# Patient Record
Sex: FEMALE | Race: BLACK OR AFRICAN AMERICAN | NOT HISPANIC OR LATINO | Employment: FULL TIME | URBAN - METROPOLITAN AREA
[De-identification: names, ages, dates, MRNs, and addresses within clinical notes are randomized per-mention and may not be internally consistent; named-entity substitution may affect disease eponyms.]

---

## 2017-06-02 ENCOUNTER — HISTORICAL (OUTPATIENT)
Dept: INTERNAL MEDICINE | Facility: CLINIC | Age: 32
End: 2017-06-02

## 2017-07-27 ENCOUNTER — HISTORICAL (OUTPATIENT)
Dept: ADMINISTRATIVE | Facility: HOSPITAL | Age: 32
End: 2017-07-27

## 2017-07-27 LAB
T4 FREE SERPL-MCNC: 0.99 NG/DL (ref 0.76–1.46)
TSH SERPL-ACNC: 3.56 MIU/L (ref 0.36–3.74)

## 2017-10-16 ENCOUNTER — HISTORICAL (OUTPATIENT)
Dept: INTERNAL MEDICINE | Facility: CLINIC | Age: 32
End: 2017-10-16

## 2017-10-16 LAB
ABS NEUT (OLG): 2.44 X10(3)/MCL (ref 2.1–9.2)
ALBUMIN SERPL-MCNC: 4.5 GM/DL (ref 3.4–5)
ALBUMIN/GLOB SERPL: 1 RATIO (ref 1–2)
ALP SERPL-CCNC: 49 UNIT/L (ref 45–117)
ALT SERPL-CCNC: 17 UNIT/L (ref 12–78)
APPEARANCE, UA: CLEAR
AST SERPL-CCNC: 17 UNIT/L (ref 15–37)
BACTERIA #/AREA URNS AUTO: ABNORMAL /[HPF]
BASOPHILS # BLD AUTO: 0.02 X10(3)/MCL
BASOPHILS NFR BLD AUTO: 0 % (ref 0–1)
BILIRUB SERPL-MCNC: 0.5 MG/DL (ref 0.2–1)
BILIRUB UR QL STRIP: NEGATIVE
BILIRUBIN DIRECT+TOT PNL SERPL-MCNC: 0.1 MG/DL
BILIRUBIN DIRECT+TOT PNL SERPL-MCNC: 0.4 MG/DL
BUN SERPL-MCNC: 8 MG/DL (ref 7–18)
CALCIUM SERPL-MCNC: 9 MG/DL (ref 8.5–10.1)
CHLORIDE SERPL-SCNC: 107 MMOL/L (ref 98–107)
CHOLEST SERPL-MCNC: 218 MG/DL
CHOLEST/HDLC SERPL: 4.1 {RATIO} (ref 0–4.4)
CO2 SERPL-SCNC: 26 MMOL/L (ref 21–32)
COLOR UR: ABNORMAL
CREAT SERPL-MCNC: 0.6 MG/DL (ref 0.6–1.3)
DEPRECATED CALCIDIOL+CALCIFEROL SERPL-MC: 15.47 NG/ML (ref 30–80)
EOSINOPHIL # BLD AUTO: 0.03 10*3/UL
EOSINOPHIL NFR BLD AUTO: 1 % (ref 0–5)
ERYTHROCYTE [DISTWIDTH] IN BLOOD BY AUTOMATED COUNT: 14.4 % (ref 11.5–14.5)
EST. AVERAGE GLUCOSE BLD GHB EST-MCNC: 120 MG/DL
GLOBULIN SER-MCNC: 4.5 GM/ML (ref 2.3–3.5)
GLUCOSE (UA): NORMAL
GLUCOSE SERPL-MCNC: 101 MG/DL (ref 74–106)
HAV IGM SERPL QL IA: NONREACTIVE
HBA1C MFR BLD: 5.8 % (ref 4.2–6.3)
HBV CORE IGM SERPL QL IA: NONREACTIVE
HBV SURFACE AG SERPL QL IA: NEGATIVE
HCT VFR BLD AUTO: 41.3 % (ref 35–46)
HCV AB SERPL QL IA: NONREACTIVE
HDLC SERPL-MCNC: 53 MG/DL
HGB BLD-MCNC: 13.4 GM/DL (ref 12–16)
HGB UR QL STRIP: NEGATIVE
HIV 1+2 AB+HIV1 P24 AG SERPL QL IA: NONREACTIVE
HYALINE CASTS #/AREA URNS LPF: ABNORMAL /[LPF]
IMM GRANULOCYTES # BLD AUTO: 0.01 10*3/UL
IMM GRANULOCYTES NFR BLD AUTO: 0 %
KETONES UR QL STRIP: ABNORMAL
LDLC SERPL CALC-MCNC: 150 MG/DL (ref 0–130)
LEUKOCYTE ESTERASE UR QL STRIP: NEGATIVE
LYMPHOCYTES # BLD AUTO: 1.56 X10(3)/MCL
LYMPHOCYTES NFR BLD AUTO: 35 % (ref 15–40)
MCH RBC QN AUTO: 27 PG (ref 26–34)
MCHC RBC AUTO-ENTMCNC: 32.4 GM/DL (ref 31–37)
MCV RBC AUTO: 83.3 FL (ref 80–100)
MONOCYTES # BLD AUTO: 0.36 X10(3)/MCL
MONOCYTES NFR BLD AUTO: 8 % (ref 4–12)
NEUTROPHILS # BLD AUTO: 2.44 X10(3)/MCL
NEUTROPHILS NFR BLD AUTO: 55 X10(3)/MCL
NITRITE UR QL STRIP: NEGATIVE
PH UR STRIP: 6 [PH] (ref 4.5–8)
PLATELET # BLD AUTO: 265 X10(3)/MCL (ref 130–400)
PMV BLD AUTO: 10.3 FL (ref 7.4–10.4)
POTASSIUM SERPL-SCNC: 3.9 MMOL/L (ref 3.5–5.1)
PROT SERPL-MCNC: 9 GM/DL (ref 6.4–8.2)
PROT UR QL STRIP: NEGATIVE
RBC # BLD AUTO: 4.96 X10(6)/MCL (ref 4–5.2)
RBC #/AREA URNS AUTO: ABNORMAL /[HPF]
SODIUM SERPL-SCNC: 140 MMOL/L (ref 136–145)
SP GR UR STRIP: 1.01 (ref 1–1.03)
SQUAMOUS #/AREA URNS LPF: ABNORMAL /[LPF]
TRIGL SERPL-MCNC: 76 MG/DL
TSH SERPL-ACNC: 1.12 MIU/L (ref 0.36–3.74)
UROBILINOGEN UR STRIP-ACNC: NORMAL
VLDLC SERPL CALC-MCNC: 15 MG/DL
WBC # SPEC AUTO: 4.4 X10(3)/MCL (ref 4.5–11)
WBC #/AREA URNS AUTO: ABNORMAL /HPF

## 2017-11-21 ENCOUNTER — HISTORICAL (OUTPATIENT)
Dept: INTERNAL MEDICINE | Facility: CLINIC | Age: 32
End: 2017-11-21

## 2017-11-21 LAB
ABS NEUT (OLG): 4.47 X10(3)/MCL (ref 2.1–9.2)
BASOPHILS # BLD AUTO: 0.03 X10(3)/MCL
BASOPHILS NFR BLD AUTO: 0 % (ref 0–1)
EOSINOPHIL # BLD AUTO: 0.06 X10(3)/MCL
EOSINOPHIL NFR BLD AUTO: 1 % (ref 0–5)
ERYTHROCYTE [DISTWIDTH] IN BLOOD BY AUTOMATED COUNT: 13.3 % (ref 11.5–14.5)
HCT VFR BLD AUTO: 35.7 % (ref 35–46)
HGB BLD-MCNC: 12.2 GM/DL (ref 12–16)
IMM GRANULOCYTES # BLD AUTO: 0.02 10*3/UL
IMM GRANULOCYTES NFR BLD AUTO: 0 %
LYMPHOCYTES # BLD AUTO: 2.74 X10(3)/MCL
LYMPHOCYTES NFR BLD AUTO: 34 % (ref 15–40)
MCH RBC QN AUTO: 27.9 PG (ref 26–34)
MCHC RBC AUTO-ENTMCNC: 34.2 GM/DL (ref 31–37)
MCV RBC AUTO: 81.7 FL (ref 80–100)
MONOCYTES # BLD AUTO: 0.7 X10(3)/MCL
MONOCYTES NFR BLD AUTO: 9 % (ref 4–12)
NEUTROPHILS # BLD AUTO: 4.47 X10(3)/MCL
NEUTROPHILS NFR BLD AUTO: 56 X10(3)/MCL
PLATELET # BLD AUTO: 250 X10(3)/MCL (ref 130–400)
PMV BLD AUTO: 9.7 FL (ref 7.4–10.4)
PROT SERPL-MCNC: 7.5 GM/DL
RBC # BLD AUTO: 4.37 X10(6)/MCL (ref 4–5.2)
WBC # SPEC AUTO: 8 X10(3)/MCL (ref 4.5–11)

## 2017-12-12 ENCOUNTER — HISTORICAL (OUTPATIENT)
Dept: RADIOLOGY | Facility: HOSPITAL | Age: 32
End: 2017-12-12

## 2017-12-12 LAB
BUN SERPL-MCNC: 12 MG/DL (ref 7–18)
CALCIUM SERPL-MCNC: 8.9 MG/DL (ref 8.5–10.1)
CHLORIDE SERPL-SCNC: 108 MMOL/L (ref 98–107)
CO2 SERPL-SCNC: 26 MMOL/L (ref 21–32)
CREAT SERPL-MCNC: 0.7 MG/DL (ref 0.6–1.3)
GLUCOSE SERPL-MCNC: 78 MG/DL (ref 74–106)
POTASSIUM SERPL-SCNC: 4 MMOL/L (ref 3.5–5.1)
SODIUM SERPL-SCNC: 141 MMOL/L (ref 136–145)

## 2017-12-21 LAB
COLOR STL: NORMAL
CONSISTENCY STL: NORMAL
HEMOCCULT SP1 STL QL: NEGATIVE

## 2017-12-22 ENCOUNTER — HISTORICAL (OUTPATIENT)
Dept: INTERNAL MEDICINE | Facility: CLINIC | Age: 32
End: 2017-12-22

## 2017-12-22 LAB
COLOR STL: NORMAL
COLOR STL: NORMAL
CONSISTENCY STL: NORMAL
CONSISTENCY STL: NORMAL
HEMOCCULT SP2 STL QL: NEGATIVE

## 2018-01-19 ENCOUNTER — HISTORICAL (OUTPATIENT)
Dept: ENDOSCOPY | Facility: HOSPITAL | Age: 33
End: 2018-01-19

## 2018-01-19 LAB — B-HCG SERPL QL: NEGATIVE

## 2018-04-05 ENCOUNTER — HISTORICAL (OUTPATIENT)
Dept: INTERNAL MEDICINE | Facility: CLINIC | Age: 33
End: 2018-04-05

## 2018-04-05 LAB
ALBUMIN SERPL-MCNC: 3.8 GM/DL (ref 3.4–5)
ALBUMIN/GLOB SERPL: 1 RATIO (ref 1–2)
ALP SERPL-CCNC: 51 UNIT/L (ref 45–117)
ALT SERPL-CCNC: 37 UNIT/L (ref 12–78)
AST SERPL-CCNC: 25 UNIT/L (ref 15–37)
BILIRUB SERPL-MCNC: 0.4 MG/DL (ref 0.2–1)
BILIRUBIN DIRECT+TOT PNL SERPL-MCNC: 0.1 MG/DL
BILIRUBIN DIRECT+TOT PNL SERPL-MCNC: 0.3 MG/DL
BUN SERPL-MCNC: 10 MG/DL (ref 7–18)
CALCIUM SERPL-MCNC: 8.8 MG/DL (ref 8.5–10.1)
CHLORIDE SERPL-SCNC: 110 MMOL/L (ref 98–107)
CO2 SERPL-SCNC: 25 MMOL/L (ref 21–32)
CREAT SERPL-MCNC: 0.8 MG/DL (ref 0.6–1.3)
DEPRECATED CALCIDIOL+CALCIFEROL SERPL-MC: 40.27 NG/ML (ref 30–80)
GLOBULIN SER-MCNC: 4.8 GM/ML (ref 2.3–3.5)
GLUCOSE SERPL-MCNC: 115 MG/DL (ref 74–106)
POTASSIUM SERPL-SCNC: 3.5 MMOL/L (ref 3.5–5.1)
PROT SERPL-MCNC: 8.6 GM/DL (ref 6.4–8.2)
SODIUM SERPL-SCNC: 139 MMOL/L (ref 136–145)

## 2018-06-04 ENCOUNTER — HISTORICAL (OUTPATIENT)
Dept: OTOLARYNGOLOGY | Facility: CLINIC | Age: 33
End: 2018-06-04

## 2018-08-27 ENCOUNTER — HISTORICAL (OUTPATIENT)
Dept: INTERNAL MEDICINE | Facility: CLINIC | Age: 33
End: 2018-08-27

## 2018-08-27 LAB
ABS NEUT (OLG): 2.22 X10(3)/MCL (ref 2.1–9.2)
ALBUMIN SERPL-MCNC: 4 GM/DL (ref 3.4–5)
ALBUMIN/GLOB SERPL: 1 RATIO (ref 1–2)
ALP SERPL-CCNC: 39 UNIT/L (ref 45–117)
ALT SERPL-CCNC: 43 UNIT/L (ref 12–78)
APPEARANCE, UA: CLEAR
AST SERPL-CCNC: 29 UNIT/L (ref 15–37)
BACTERIA #/AREA URNS AUTO: ABNORMAL /[HPF]
BASOPHILS # BLD AUTO: 0.03 X10(3)/MCL
BASOPHILS NFR BLD AUTO: 0 %
BILIRUB SERPL-MCNC: 0.5 MG/DL (ref 0.2–1)
BILIRUB UR QL STRIP: NEGATIVE
BILIRUBIN DIRECT+TOT PNL SERPL-MCNC: 0.1 MG/DL
BILIRUBIN DIRECT+TOT PNL SERPL-MCNC: 0.4 MG/DL
BUN SERPL-MCNC: 6 MG/DL (ref 7–18)
CALCIUM SERPL-MCNC: 8.7 MG/DL (ref 8.5–10.1)
CHLORIDE SERPL-SCNC: 104 MMOL/L (ref 98–107)
CHOLEST SERPL-MCNC: 200 MG/DL
CHOLEST/HDLC SERPL: 2.9 {RATIO} (ref 0–4.4)
CO2 SERPL-SCNC: 27 MMOL/L (ref 21–32)
COLOR UR: NORMAL
CREAT SERPL-MCNC: 0.7 MG/DL (ref 0.6–1.3)
EOSINOPHIL # BLD AUTO: 0.09 X10(3)/MCL
EOSINOPHIL NFR BLD AUTO: 2 %
ERYTHROCYTE [DISTWIDTH] IN BLOOD BY AUTOMATED COUNT: 14.1 % (ref 11.5–14.5)
EST. AVERAGE GLUCOSE BLD GHB EST-MCNC: 108 MG/DL
GLOBULIN SER-MCNC: 4.2 GM/ML (ref 2.3–3.5)
GLUCOSE (UA): NORMAL
GLUCOSE SERPL-MCNC: 89 MG/DL (ref 74–106)
HAV IGM SERPL QL IA: NONREACTIVE
HBA1C MFR BLD: 5.4 % (ref 4.2–6.3)
HBV CORE IGM SERPL QL IA: NONREACTIVE
HBV SURFACE AG SERPL QL IA: NEGATIVE
HCT VFR BLD AUTO: 39 % (ref 35–46)
HCV AB SERPL QL IA: NONREACTIVE
HDLC SERPL-MCNC: 68 MG/DL
HGB BLD-MCNC: 12.7 GM/DL (ref 12–16)
HGB UR QL STRIP: NEGATIVE
HIV 1+2 AB+HIV1 P24 AG SERPL QL IA: NONREACTIVE
HYALINE CASTS #/AREA URNS LPF: ABNORMAL /[LPF]
IMM GRANULOCYTES # BLD AUTO: 0.01 10*3/UL
IMM GRANULOCYTES NFR BLD AUTO: 0 %
KETONES UR QL STRIP: NEGATIVE
LDLC SERPL CALC-MCNC: 110 MG/DL (ref 0–130)
LEUKOCYTE ESTERASE UR QL STRIP: NEGATIVE
LYMPHOCYTES # BLD AUTO: 2.52 X10(3)/MCL
LYMPHOCYTES NFR BLD AUTO: 46 % (ref 13–40)
MCH RBC QN AUTO: 27 PG (ref 26–34)
MCHC RBC AUTO-ENTMCNC: 32.6 GM/DL (ref 31–37)
MCV RBC AUTO: 82.8 FL (ref 80–100)
MONOCYTES # BLD AUTO: 0.63 X10(3)/MCL
MONOCYTES NFR BLD AUTO: 12 % (ref 4–12)
NEUTROPHILS # BLD AUTO: 2.22 X10(3)/MCL
NEUTROPHILS NFR BLD AUTO: 40 X10(3)/MCL
NITRITE UR QL STRIP: NEGATIVE
PH UR STRIP: 5.5 [PH] (ref 4.5–8)
PLATELET # BLD AUTO: 294 X10(3)/MCL (ref 130–400)
PMV BLD AUTO: 9.9 FL (ref 7.4–10.4)
POTASSIUM SERPL-SCNC: 4.3 MMOL/L (ref 3.5–5.1)
PROT SERPL-MCNC: 8.2 GM/DL (ref 6.4–8.2)
PROT UR QL STRIP: NEGATIVE
RBC # BLD AUTO: 4.71 X10(6)/MCL (ref 4–5.2)
RBC #/AREA URNS AUTO: ABNORMAL /[HPF]
SODIUM SERPL-SCNC: 138 MMOL/L (ref 136–145)
SP GR UR STRIP: 1.01 (ref 1–1.03)
SQUAMOUS #/AREA URNS LPF: ABNORMAL /[LPF]
T PALLIDUM AB SER QL: NONREACTIVE
TRIGL SERPL-MCNC: 111 MG/DL
TSH SERPL-ACNC: 1.66 MIU/L (ref 0.36–3.74)
UROBILINOGEN UR STRIP-ACNC: NORMAL
VLDLC SERPL CALC-MCNC: 22 MG/DL
WBC # SPEC AUTO: 5.5 X10(3)/MCL (ref 4.5–11)
WBC #/AREA URNS AUTO: ABNORMAL /HPF

## 2019-02-13 LAB — POC BETA-HCG (QUAL): NEGATIVE

## 2019-07-10 ENCOUNTER — HISTORICAL (OUTPATIENT)
Dept: RADIOLOGY | Facility: HOSPITAL | Age: 34
End: 2019-07-10

## 2019-08-21 ENCOUNTER — HISTORICAL (OUTPATIENT)
Dept: INTERNAL MEDICINE | Facility: CLINIC | Age: 34
End: 2019-08-21

## 2019-08-21 LAB
BUN SERPL-MCNC: 11 MG/DL (ref 7–18)
CALCIUM SERPL-MCNC: 8.8 MG/DL (ref 8.5–10.1)
CHLORIDE SERPL-SCNC: 105 MMOL/L (ref 98–107)
CO2 SERPL-SCNC: 25 MMOL/L (ref 21–32)
CREAT SERPL-MCNC: 0.7 MG/DL (ref 0.6–1.3)
CREAT/UREA NIT SERPL: 16
EST. AVERAGE GLUCOSE BLD GHB EST-MCNC: 128 MG/DL
GLUCOSE SERPL-MCNC: 94 MG/DL (ref 74–106)
HBA1C MFR BLD: 6.1 % (ref 4.2–6.3)
POTASSIUM SERPL-SCNC: 4 MMOL/L (ref 3.5–5.1)
SODIUM SERPL-SCNC: 138 MMOL/L (ref 136–145)
TSH SERPL-ACNC: 0.87 MIU/L (ref 0.36–3.74)

## 2019-08-28 ENCOUNTER — HISTORICAL (OUTPATIENT)
Dept: ADMINISTRATIVE | Facility: HOSPITAL | Age: 34
End: 2019-08-28

## 2019-08-28 LAB — B-HCG SERPL QL: NEGATIVE

## 2020-01-22 ENCOUNTER — HISTORICAL (OUTPATIENT)
Dept: WOUND CARE | Facility: HOSPITAL | Age: 35
End: 2020-01-22

## 2020-01-22 LAB — B-HCG SERPL QL: NEGATIVE

## 2020-02-18 LAB — POC BETA-HCG (QUAL): NEGATIVE

## 2020-11-02 ENCOUNTER — HISTORICAL (OUTPATIENT)
Dept: RADIOLOGY | Facility: HOSPITAL | Age: 35
End: 2020-11-02

## 2020-11-02 LAB
ABS NEUT (OLG): 2.74 X10(3)/MCL (ref 2.1–9.2)
ALBUMIN SERPL-MCNC: 4.3 GM/DL (ref 3.5–5)
ALBUMIN/GLOB SERPL: 1.1 RATIO (ref 1.1–2)
ALP SERPL-CCNC: 48 UNIT/L (ref 40–150)
ALT SERPL-CCNC: 11 UNIT/L (ref 0–55)
APPEARANCE, UA: CLEAR
AST SERPL-CCNC: 20 UNIT/L (ref 5–34)
BACTERIA #/AREA URNS AUTO: ABNORMAL /HPF
BASOPHILS # BLD AUTO: 0 X10(3)/MCL (ref 0–0.2)
BASOPHILS NFR BLD AUTO: 0 %
BILIRUB SERPL-MCNC: 0.6 MG/DL
BILIRUB UR QL STRIP: NEGATIVE
BILIRUBIN DIRECT+TOT PNL SERPL-MCNC: 0.2 MG/DL (ref 0–0.5)
BILIRUBIN DIRECT+TOT PNL SERPL-MCNC: 0.4 MG/DL (ref 0–0.8)
BUN SERPL-MCNC: 11 MG/DL (ref 7–18.7)
CALCIUM SERPL-MCNC: 9.4 MG/DL (ref 8.4–10.2)
CHLORIDE SERPL-SCNC: 105 MMOL/L (ref 98–107)
CHOLEST SERPL-MCNC: 196 MG/DL
CHOLEST/HDLC SERPL: 5 {RATIO} (ref 0–5)
CO2 SERPL-SCNC: 25 MMOL/L (ref 22–29)
COLOR UR: NORMAL
CREAT SERPL-MCNC: 0.71 MG/DL (ref 0.55–1.02)
DEPRECATED CALCIDIOL+CALCIFEROL SERPL-MC: 47.7 NG/ML (ref 30–80)
EOSINOPHIL # BLD AUTO: 0 X10(3)/MCL (ref 0–0.9)
EOSINOPHIL NFR BLD AUTO: 1 %
ERYTHROCYTE [DISTWIDTH] IN BLOOD BY AUTOMATED COUNT: 13.6 % (ref 11.5–14.5)
EST. AVERAGE GLUCOSE BLD GHB EST-MCNC: 116.9 MG/DL
GLOBULIN SER-MCNC: 4 GM/DL (ref 2.4–3.5)
GLUCOSE (UA): NEGATIVE
GLUCOSE SERPL-MCNC: 88 MG/DL (ref 74–100)
HAV IGM SERPL QL IA: NONREACTIVE
HBA1C MFR BLD: 5.7 %
HBV CORE IGM SERPL QL IA: NONREACTIVE
HBV SURFACE AG SERPL QL IA: NONREACTIVE
HCT VFR BLD AUTO: 38.3 % (ref 35–46)
HCV AB SERPL QL IA: NONREACTIVE
HDLC SERPL-MCNC: 43 MG/DL (ref 35–60)
HGB BLD-MCNC: 12.8 GM/DL (ref 12–16)
HGB UR QL STRIP: 0.1
HIV 1+2 AB+HIV1 P24 AG SERPL QL IA: NONREACTIVE
HYALINE CASTS #/AREA URNS LPF: ABNORMAL /LPF
KETONES UR QL STRIP: NEGATIVE
LDLC SERPL CALC-MCNC: 136 MG/DL (ref 50–140)
LEUKOCYTE ESTERASE UR QL STRIP: NEGATIVE
LYMPHOCYTES # BLD AUTO: 2.6 X10(3)/MCL (ref 0.6–4.6)
LYMPHOCYTES NFR BLD AUTO: 44 %
MCH RBC QN AUTO: 27.6 PG (ref 26–34)
MCHC RBC AUTO-ENTMCNC: 33.4 GM/DL (ref 31–37)
MCV RBC AUTO: 82.7 FL (ref 80–100)
MONOCYTES # BLD AUTO: 0.5 X10(3)/MCL (ref 0.1–1.3)
MONOCYTES NFR BLD AUTO: 8 %
NEUTROPHILS # BLD AUTO: 2.74 X10(3)/MCL (ref 2.1–9.2)
NEUTROPHILS NFR BLD AUTO: 46 %
NITRITE UR QL STRIP: NEGATIVE
PH UR STRIP: 6.5 [PH] (ref 4.5–8)
PLATELET # BLD AUTO: 287 X10(3)/MCL (ref 130–400)
PMV BLD AUTO: 10.7 FL (ref 7.4–10.4)
POTASSIUM SERPL-SCNC: 4 MMOL/L (ref 3.5–5.1)
PROT SERPL-MCNC: 8.3 GM/DL (ref 6.4–8.3)
PROT UR QL STRIP: NEGATIVE
RBC # BLD AUTO: 4.63 X10(6)/MCL (ref 4–5.2)
RBC #/AREA URNS AUTO: ABNORMAL /HPF
SODIUM SERPL-SCNC: 139 MMOL/L (ref 136–145)
SP GR UR STRIP: 1.02 (ref 1–1.03)
SQUAMOUS #/AREA URNS LPF: ABNORMAL /LPF
T PALLIDUM AB SER QL: NONREACTIVE
TRIGL SERPL-MCNC: 83 MG/DL (ref 37–140)
TSH SERPL-ACNC: 0.74 UIU/ML (ref 0.35–4.94)
UROBILINOGEN UR STRIP-ACNC: NORMAL
VLDLC SERPL CALC-MCNC: 17 MG/DL
WBC # SPEC AUTO: 5.9 X10(3)/MCL (ref 4.5–11)
WBC #/AREA URNS AUTO: ABNORMAL /HPF

## 2020-11-19 ENCOUNTER — HISTORICAL (OUTPATIENT)
Dept: ADMINISTRATIVE | Facility: HOSPITAL | Age: 35
End: 2020-11-19

## 2021-02-03 ENCOUNTER — HISTORICAL (OUTPATIENT)
Dept: SURGERY | Facility: HOSPITAL | Age: 36
End: 2021-02-03

## 2021-03-09 LAB — POC BETA-HCG (QUAL): POSITIVE

## 2021-04-08 ENCOUNTER — HISTORICAL (OUTPATIENT)
Dept: ADMINISTRATIVE | Facility: HOSPITAL | Age: 36
End: 2021-04-08

## 2021-04-08 LAB
ABS NEUT (OLG): 5.61 X10(3)/MCL (ref 2.1–9.2)
ALBUMIN SERPL-MCNC: 4 GM/DL (ref 3.5–5)
ALBUMIN/GLOB SERPL: 0.8 RATIO (ref 1.1–2)
ALP SERPL-CCNC: 43 UNIT/L (ref 40–150)
ALT SERPL-CCNC: 19 UNIT/L (ref 0–55)
APPEARANCE, UA: CLEAR
AST SERPL-CCNC: 32 UNIT/L (ref 5–34)
BACTERIA #/AREA URNS AUTO: ABNORMAL /HPF
BASOPHILS # BLD AUTO: 0 X10(3)/MCL (ref 0–0.2)
BASOPHILS NFR BLD AUTO: 0 %
BILIRUB SERPL-MCNC: 0.7 MG/DL
BILIRUB SERPL-MCNC: NEGATIVE MG/DL
BILIRUB UR QL STRIP: NEGATIVE
BILIRUBIN DIRECT+TOT PNL SERPL-MCNC: 0.2 MG/DL (ref 0–0.5)
BILIRUBIN DIRECT+TOT PNL SERPL-MCNC: 0.5 MG/DL (ref 0–0.8)
BLOOD URINE, POC: NEGATIVE
BUN SERPL-MCNC: 5.3 MG/DL (ref 7–18.7)
CALCIUM SERPL-MCNC: 9.3 MG/DL (ref 8.4–10.2)
CHLORIDE SERPL-SCNC: 104 MMOL/L (ref 98–107)
CLARITY, POC UA: CLEAR
CO2 SERPL-SCNC: 22 MMOL/L (ref 22–29)
COLOR UR: NORMAL
COLOR, POC UA: YELLOW
CREAT SERPL-MCNC: 0.57 MG/DL (ref 0.55–1.02)
EOSINOPHIL # BLD AUTO: 0 X10(3)/MCL (ref 0–0.9)
EOSINOPHIL NFR BLD AUTO: 0 %
ERYTHROCYTE [DISTWIDTH] IN BLOOD BY AUTOMATED COUNT: 13.1 % (ref 11.5–14.5)
EST. AVERAGE GLUCOSE BLD GHB EST-MCNC: 114 MG/DL
GLOBULIN SER-MCNC: 4.9 GM/DL (ref 2.4–3.5)
GLUCOSE (UA): NEGATIVE
GLUCOSE SERPL-MCNC: 100 MG/DL (ref 74–100)
GLUCOSE UR QL STRIP: NEGATIVE
HBA1C MFR BLD: 5.6 %
HBV SURFACE AG SERPL QL IA: NONREACTIVE
HCT VFR BLD AUTO: 38.9 % (ref 35–46)
HCV AB SERPL QL IA: NONREACTIVE
HGB BLD-MCNC: 12.8 GM/DL (ref 12–16)
HGB UR QL STRIP: NEGATIVE
HIV 1+2 AB+HIV1 P24 AG SERPL QL IA: NONREACTIVE
HYALINE CASTS #/AREA URNS LPF: ABNORMAL /LPF
IMM GRANULOCYTES # BLD AUTO: 0.02 10*3/UL
IMM GRANULOCYTES NFR BLD AUTO: 0 %
KETONES UR QL STRIP: NEGATIVE
KETONES UR QL STRIP: NEGATIVE
LEUKOCYTE EST, POC UA: NEGATIVE
LEUKOCYTE ESTERASE UR QL STRIP: NEGATIVE
LYMPHOCYTES # BLD AUTO: 2.3 X10(3)/MCL (ref 0.6–4.6)
LYMPHOCYTES NFR BLD AUTO: 26 %
MCH RBC QN AUTO: 27.9 PG (ref 26–34)
MCHC RBC AUTO-ENTMCNC: 32.9 GM/DL (ref 31–37)
MCV RBC AUTO: 84.7 FL (ref 80–100)
MONOCYTES # BLD AUTO: 0.8 X10(3)/MCL (ref 0.1–1.3)
MONOCYTES NFR BLD AUTO: 10 %
NEUTROPHILS # BLD AUTO: 5.61 X10(3)/MCL (ref 2.1–9.2)
NEUTROPHILS NFR BLD AUTO: 64 %
NITRITE UR QL STRIP: NEGATIVE
NITRITE, POC UA: NEGATIVE
PH UR STRIP: 7 [PH] (ref 4.5–8)
PH, POC UA: 7
PLATELET # BLD AUTO: 321 X10(3)/MCL (ref 130–400)
PMV BLD AUTO: 10.5 FL (ref 7.4–10.4)
POTASSIUM SERPL-SCNC: 4.4 MMOL/L (ref 3.5–5.1)
PROT SERPL-MCNC: 8.9 GM/DL (ref 6.4–8.3)
PROT UR QL STRIP: NEGATIVE
PROTEIN, POC: NEGATIVE
RBC # BLD AUTO: 4.59 X10(6)/MCL (ref 4–5.2)
RBC #/AREA URNS AUTO: ABNORMAL /HPF
SODIUM SERPL-SCNC: 136 MMOL/L (ref 136–145)
SP GR UR STRIP: 1.01 (ref 1–1.03)
SPECIFIC GRAVITY, POC UA: 1.02
SQUAMOUS #/AREA URNS LPF: ABNORMAL /LPF
T PALLIDUM AB SER QL: NONREACTIVE
TSH SERPL-ACNC: 1.45 UIU/ML (ref 0.35–4.94)
UROBILINOGEN UR STRIP-ACNC: NORMAL
UROBILINOGEN, POC UA: NORMAL
WBC # SPEC AUTO: 8.8 X10(3)/MCL (ref 4.5–11)
WBC #/AREA URNS AUTO: ABNORMAL /HPF

## 2021-04-10 LAB — FINAL CULTURE: NO GROWTH

## 2021-04-13 ENCOUNTER — HISTORICAL (OUTPATIENT)
Dept: ADMINISTRATIVE | Facility: HOSPITAL | Age: 36
End: 2021-04-13

## 2021-04-13 LAB
CREAT 24H UR-MCNC: 1102.5 MG/DAY (ref 710–1650)
CREAT UR-MCNC: 45 MG/DL (ref 47–110)
PROT 24H UR-MCNC: <167 MG/24HR (ref 0–165)

## 2021-04-27 LAB
BILIRUB SERPL-MCNC: NEGATIVE MG/DL
BLOOD URINE, POC: NEGATIVE
CLARITY, POC UA: CLEAR
COLOR, POC UA: YELLOW
GLUCOSE UR QL STRIP: NEGATIVE
KETONES UR QL STRIP: NEGATIVE
LEUKOCYTE EST, POC UA: NEGATIVE
NITRITE, POC UA: NEGATIVE
PH, POC UA: 6
PROTEIN, POC: NEGATIVE
SPECIFIC GRAVITY, POC UA: 1.03
UROBILINOGEN, POC UA: NORMAL

## 2021-05-18 LAB
BILIRUB SERPL-MCNC: NEGATIVE MG/DL
BLOOD URINE, POC: NEGATIVE
CLARITY, POC UA: CLEAR
COLOR, POC UA: YELLOW
GLUCOSE UR QL STRIP: NEGATIVE
KETONES UR QL STRIP: NEGATIVE
LEUKOCYTE EST, POC UA: NEGATIVE
NITRITE, POC UA: NEGATIVE
PROTEIN, POC: NEGATIVE
SPECIFIC GRAVITY, POC UA: 1.02
UROBILINOGEN, POC UA: NORMAL

## 2021-06-15 LAB
BILIRUB SERPL-MCNC: NEGATIVE MG/DL
BLOOD URINE, POC: NEGATIVE
CLARITY, POC UA: NORMAL
COLOR, POC UA: YELLOW
GLUCOSE UR QL STRIP: NEGATIVE
KETONES UR QL STRIP: NEGATIVE
LEUKOCYTE EST, POC UA: NEGATIVE
NITRITE, POC UA: NEGATIVE
PH, POC UA: 7
PROTEIN, POC: NEGATIVE
SPECIFIC GRAVITY, POC UA: 1.02
UROBILINOGEN, POC UA: NORMAL

## 2021-08-10 ENCOUNTER — HISTORICAL (OUTPATIENT)
Dept: ADMINISTRATIVE | Facility: HOSPITAL | Age: 36
End: 2021-08-10

## 2021-08-10 LAB
BILIRUB SERPL-MCNC: NEGATIVE MG/DL
BLOOD URINE, POC: NEGATIVE
CLARITY, POC UA: CLEAR
COLOR, POC UA: YELLOW
DEPRECATED CALCIDIOL+CALCIFEROL SERPL-MC: 65.1 NG/ML (ref 30–80)
GLUCOSE UR QL STRIP: NEGATIVE
HCT VFR BLD AUTO: 38.4 % (ref 35–46)
HGB BLD-MCNC: 12.7 GM/DL (ref 12–16)
HIV 1+2 AB+HIV1 P24 AG SERPL QL IA: NONREACTIVE
KETONES UR QL STRIP: NEGATIVE
LEUKOCYTE EST, POC UA: NEGATIVE
NITRITE, POC UA: NEGATIVE
PH, POC UA: 7
PROTEIN, POC: NEGATIVE
SPECIFIC GRAVITY, POC UA: 1.01
T PALLIDUM AB SER QL: NONREACTIVE
UROBILINOGEN, POC UA: NORMAL

## 2021-09-07 LAB
BILIRUB SERPL-MCNC: NEGATIVE MG/DL
BLOOD URINE, POC: NEGATIVE
CLARITY, POC UA: CLEAR
COLOR, POC UA: YELLOW
GLUCOSE UR QL STRIP: NEGATIVE
KETONES UR QL STRIP: NEGATIVE
LEUKOCYTE EST, POC UA: NEGATIVE
NITRITE, POC UA: NEGATIVE
PH, POC UA: 7
PROTEIN, POC: NEGATIVE
SPECIFIC GRAVITY, POC UA: 1.01
UROBILINOGEN, POC UA: NORMAL

## 2021-10-12 ENCOUNTER — HISTORICAL (OUTPATIENT)
Dept: ADMINISTRATIVE | Facility: HOSPITAL | Age: 36
End: 2021-10-12

## 2021-10-12 LAB
ABS NEUT (OLG): 5.85 X10(3)/MCL (ref 2.1–9.2)
BASOPHILS # BLD AUTO: 0 X10(3)/MCL (ref 0–0.2)
BASOPHILS NFR BLD AUTO: 0 %
BILIRUB SERPL-MCNC: NEGATIVE MG/DL
BLOOD URINE, POC: NEGATIVE
CLARITY, POC UA: NORMAL
COLOR, POC UA: YELLOW
EOSINOPHIL # BLD AUTO: 0 X10(3)/MCL (ref 0–0.9)
EOSINOPHIL NFR BLD AUTO: 0 %
ERYTHROCYTE [DISTWIDTH] IN BLOOD BY AUTOMATED COUNT: 13.8 % (ref 11.5–14.5)
GLUCOSE UR QL STRIP: NEGATIVE
HCT VFR BLD AUTO: 40.9 % (ref 35–46)
HGB BLD-MCNC: 13.4 GM/DL (ref 12–16)
HIV 1+2 AB+HIV1 P24 AG SERPL QL IA: NONREACTIVE
IMM GRANULOCYTES # BLD AUTO: 0.03 10*3/UL
IMM GRANULOCYTES NFR BLD AUTO: 0 %
KETONES UR QL STRIP: NEGATIVE
LEUKOCYTE EST, POC UA: NEGATIVE
LYMPHOCYTES # BLD AUTO: 1.5 X10(3)/MCL (ref 0.6–4.6)
LYMPHOCYTES NFR BLD AUTO: 18 %
MCH RBC QN AUTO: 28.6 PG (ref 26–34)
MCHC RBC AUTO-ENTMCNC: 32.8 GM/DL (ref 31–37)
MCV RBC AUTO: 87.4 FL (ref 80–100)
MONOCYTES # BLD AUTO: 0.9 X10(3)/MCL (ref 0.1–1.3)
MONOCYTES NFR BLD AUTO: 11 %
NEUTROPHILS # BLD AUTO: 5.85 X10(3)/MCL (ref 2.1–9.2)
NEUTROPHILS NFR BLD AUTO: 70 %
NITRITE, POC UA: NEGATIVE
NRBC BLD AUTO-RTO: 0 % (ref 0–0.2)
PH, POC UA: 6.5
PLATELET # BLD AUTO: 215 X10(3)/MCL (ref 130–400)
PMV BLD AUTO: 11.2 FL (ref 7.4–10.4)
PROTEIN, POC: NORMAL
RBC # BLD AUTO: 4.68 X10(6)/MCL (ref 4–5.2)
SPECIFIC GRAVITY, POC UA: 1.02
T PALLIDUM AB SER QL: NONREACTIVE
UROBILINOGEN, POC UA: NORMAL
WBC # SPEC AUTO: 8.4 X10(3)/MCL (ref 4.5–11)

## 2021-10-26 LAB
BILIRUB SERPL-MCNC: NEGATIVE MG/DL
BLOOD URINE, POC: NEGATIVE
CLARITY, POC UA: NORMAL
COLOR, POC UA: YELLOW
GLUCOSE UR QL STRIP: NEGATIVE
KETONES UR QL STRIP: NORMAL
LEUKOCYTE EST, POC UA: NEGATIVE
NITRITE, POC UA: NEGATIVE
PH, POC UA: 7
PROTEIN, POC: NORMAL
SPECIFIC GRAVITY, POC UA: 1.02
UROBILINOGEN, POC UA: NORMAL

## 2021-11-12 ENCOUNTER — HISTORICAL (OUTPATIENT)
Dept: ADMINISTRATIVE | Facility: HOSPITAL | Age: 36
End: 2021-11-12

## 2021-11-12 LAB
ALBUMIN SERPL-MCNC: 3.9 GM/DL (ref 3.5–5)
ALBUMIN/GLOB SERPL: 0.8 RATIO (ref 1.1–2)
ALP SERPL-CCNC: 125 UNIT/L (ref 40–150)
ALT SERPL-CCNC: 44 UNIT/L (ref 0–55)
AST SERPL-CCNC: 43 UNIT/L (ref 5–34)
BILIRUB SERPL-MCNC: 0.3 MG/DL
BILIRUBIN DIRECT+TOT PNL SERPL-MCNC: 0.1 MG/DL (ref 0–0.5)
BILIRUBIN DIRECT+TOT PNL SERPL-MCNC: 0.2 MG/DL (ref 0–0.8)
BUN SERPL-MCNC: 10.6 MG/DL (ref 7–18.7)
CALCIUM SERPL-MCNC: 9.9 MG/DL (ref 8.7–10.5)
CHLORIDE SERPL-SCNC: 105 MMOL/L (ref 98–107)
CO2 SERPL-SCNC: 26 MMOL/L (ref 22–29)
CREAT SERPL-MCNC: 0.8 MG/DL (ref 0.55–1.02)
GLOBULIN SER-MCNC: 4.7 GM/DL (ref 2.4–3.5)
GLUCOSE SERPL-MCNC: 52 MG/DL (ref 74–100)
POTASSIUM SERPL-SCNC: 4.2 MMOL/L (ref 3.5–5.1)
PROT SERPL-MCNC: 8.6 GM/DL (ref 6.4–8.3)
SODIUM SERPL-SCNC: 141 MMOL/L (ref 136–145)

## 2021-12-09 ENCOUNTER — HISTORICAL (OUTPATIENT)
Dept: ADMINISTRATIVE | Facility: HOSPITAL | Age: 36
End: 2021-12-09

## 2021-12-09 LAB
ALBUMIN SERPL-MCNC: 4 GM/DL (ref 3.5–5)
ALBUMIN/GLOB SERPL: 1 RATIO (ref 1.1–2)
ALP SERPL-CCNC: 87 UNIT/L (ref 40–150)
ALT SERPL-CCNC: 29 UNIT/L (ref 0–55)
AST SERPL-CCNC: 31 UNIT/L (ref 5–34)
BILIRUB SERPL-MCNC: 0.4 MG/DL
BILIRUBIN DIRECT+TOT PNL SERPL-MCNC: 0.1 MG/DL (ref 0–0.5)
BILIRUBIN DIRECT+TOT PNL SERPL-MCNC: 0.3 MG/DL (ref 0–0.8)
BUN SERPL-MCNC: 6.2 MG/DL (ref 7–18.7)
CALCIUM SERPL-MCNC: 9.5 MG/DL (ref 8.7–10.5)
CHLORIDE SERPL-SCNC: 103 MMOL/L (ref 98–107)
CO2 SERPL-SCNC: 27 MMOL/L (ref 22–29)
CREAT SERPL-MCNC: 0.71 MG/DL (ref 0.55–1.02)
EST. AVERAGE GLUCOSE BLD GHB EST-MCNC: 102.5 MG/DL
GLOBULIN SER-MCNC: 4.1 GM/DL (ref 2.4–3.5)
GLUCOSE SERPL-MCNC: 64 MG/DL (ref 74–100)
HBA1C MFR BLD: 5.2 %
POTASSIUM SERPL-SCNC: 4 MMOL/L (ref 3.5–5.1)
PROT SERPL-MCNC: 8.1 GM/DL (ref 6.4–8.3)
SODIUM SERPL-SCNC: 138 MMOL/L (ref 136–145)

## 2022-01-03 ENCOUNTER — HISTORICAL (OUTPATIENT)
Dept: ADMINISTRATIVE | Facility: HOSPITAL | Age: 37
End: 2022-01-03

## 2022-01-03 LAB — TSH SERPL-ACNC: 1.14 UIU/ML (ref 0.35–4.94)

## 2022-01-04 ENCOUNTER — HISTORICAL (OUTPATIENT)
Dept: RADIOLOGY | Facility: HOSPITAL | Age: 37
End: 2022-01-04

## 2022-03-02 LAB
HUMAN PAPILLOMAVIRUS (HPV): NORMAL
PAP RECOMMENDATION EXT: NORMAL
PAP SMEAR: NORMAL

## 2022-03-17 ENCOUNTER — HISTORICAL (OUTPATIENT)
Dept: RADIOLOGY | Facility: HOSPITAL | Age: 37
End: 2022-03-17

## 2022-03-17 ENCOUNTER — HISTORICAL (OUTPATIENT)
Dept: ADMINISTRATIVE | Facility: HOSPITAL | Age: 37
End: 2022-03-17

## 2022-04-10 ENCOUNTER — HISTORICAL (OUTPATIENT)
Dept: ADMINISTRATIVE | Facility: HOSPITAL | Age: 37
End: 2022-04-10
Payer: MEDICAID

## 2022-04-26 VITALS
DIASTOLIC BLOOD PRESSURE: 78 MMHG | WEIGHT: 149.69 LBS | SYSTOLIC BLOOD PRESSURE: 125 MMHG | BODY MASS INDEX: 26.52 KG/M2 | HEIGHT: 63 IN | OXYGEN SATURATION: 98 %

## 2022-05-03 NOTE — HISTORICAL [facility] CERNER
This is a historical note converted from Justyna. Formatting and pictures may have been removed.  Please reference Justyna for original formatting and attached multimedia. Chief Complaint  f/u for fna results  History of Present Illness  32 year old female referred for multinodular goiter. It was discovered on ultrasound ordered by PCP for concern for thyromegaly. She denies lumps/bumps, dysphagia, dysphonia, heat/cold intolerance, weight loss/gain. No family history of thyroid cancer. No history of XRT to neck.  ?  7/27/17: Here for follow up of MNG and FNA. FNA benign. No concerning features. No family history of thyroid cancer or h/o XRT. No compressive symptoms. Patient does report history treatment for depression. Was on several medications but they did not work so stopped seeing her psychiatrist. Has been feeling tired all the time and down. Also feels she has mental fogginess and difficulty concentrating. Also had some weight loss (7 lbs in last 6 months)?.?Does not have PCP. TFTs in January were normal  Review of Systems  Constitutional - + fatigue  Eye - Denies  HENT - Denies  Respiratory - Denies  Cardiovascular - Denies  Gastrointestinal - Denies  Genitourinary - Denies  Heme/Lymph ?- Denies  Endocrine ?- Denies  Immunologic ?- Denies  Musculoskeletal ?- Denies  Integumentary ?- Denies  Neurologic ?- + difficulty concentrating, + feeling down  All Other ROS negative  Physical Exam  Vitals & Measurements  T:?36.7? ?C ?(Oral)? HR:?69?(Peripheral)? BP:?124/84? HT:?162.56?cm? WT:?67.1?kg?  General - no acute distress, alert/oriented  Eye - extraocular movements intact bilaterally, pupils equal round and reactive to light and accommodation  Head - normocephalic, atraumatic  Ears - externally normal. Pneumatized bilaterally.  Nose - bilateral nares patent, nasal septal midline, no masses/lesions apparent  Oral cavity/oropharynx - mucosal membranes moist, tonsils within normal limits, floor of mouth soft and  nontender, tongue within normal limits, no masses or lesions noted  Neck - supple, no cervical lymphadenopathy, normal range of motion. No palpable nodules.  Respiratory - nonlabored breathing  Cardiovascular - 2+ carotid pulses  Musculoskeletal - no peripheral weakness  Integumentary - no obvious skin lesions  Neurologic - cranial nerves II to XII grossly intact bilaterally  Assessment/Plan  1.?Thyromegaly  ?  32 year old female with multinodular goiter, largest nodule 1.3mm right lobe. FNA benign.?No compressive symptoms. TSH/T4 normal. Patient also?with symptoms concerning for depression. Has been seen in the past by a psychiatrist and put on several medications but felt like were not working?so she stopped going. Had discussion about seeing someone here in the medicine clinic. She would like that.  ?   - referral to Internal medicine to establish PCPtae for depression  - Continued observation of thyroid nodule, repeat US in 12 months with follow up afterwards  - will recheck TFTs to make sure not causing any of her symptoms  ?   Problem List/Past Medical History  Abnormal vaginal Pap smear  Polycystic ovarian disease  Tobacco user  Tobacco user  Tobacco user  Historical  No historical problems  Procedure/Surgical History  pt denies ever having surgery.  Medications  CLONAZEPAM 0.5 MG TABLET, Oral  RWVOA-DC-UTHKIEL 3-0.02-0.451, 1 tab(s), Oral, Daily,? ?Not taking  ESCITALOPRAM 10 MG TABLET, 10 mg, 1 tab(s), Oral, Daily,? ?Not taking  Melpaque Hp 4% Cream  metformin 500 mg oral tablet, 500 mg, 1 tab(s), Oral, BID, 11 refills  OLANZAPINE 10 MG TABLET, 10 mg, 1 tab(s), Oral, qPM,? ?Not taking  Allergies  Benadryl  Social History  Alcohol - Denies Alcohol Use  Never  Employment/School  Employed, Student  Home/Environment  Lives with Spouse. Alcohol abuse in household: No. Substance abuse in household: No. Smoker in household: Yes. Injuries/Abuse/Neglect in household: No. Feels unsafe at home: No. Safe place to  go: Yes. Agency(s)/Others notified: No. Family/Friends available for support: Yes. Concern for family members at home: No. Major illness in household: No. Financial concerns: No.  Nutrition/Health  Regular, Caffeine intake amount: None. Wants to lose weight: No. Sleeping concerns: No. Feels highly stressed: Yes.  Substance Abuse  Past, Marijuana  Past, Marijuana  Current, Marijuana, Daily  Tobacco - Denies Tobacco Use  Smoker, current status unknown  Current every day smoker  Former smoker, Cigarettes  Current every day smoker, Cigarettes  Family History  Autoimmune disease: Sister.  Diabetes mellitus type 2: Father.  Dialysis: Father and Brother.  Hypertension.: Mother and Father.  Kidney disease: Father.  Renal failure syndrome.: Brother.  Diagnostic Results  Final Diagnosis (Verified) 7/20/17  ?   1. ?Fine needle aspiration of Mid thyroid nodule #1 (inferior-most, 1.28 cm):  - Colloid, few foamy macrophages and follicular cells, consistent with a benign cystic thyroid nodule.  ?   2. ?Fine needle aspiration of Mid thyroid nodule #2 (superior-most, 1.22 cm):  - Colloid and few follicular cells consistent with a benign thyroid nodule.  ?  ?Reason For Exam  Nontoxic goiter, unspecified;Enlarged Thyroid  ?  ?   Thyroid US 7/18/17  Radiology Report  Indication: Goiter  ?  FINDINGS: Standard sonographic evaluation of the thyroid was  performed. No prior studies are available for comparison.  ?  The right thyroid lobe measures 4.3 x 1.9 x 2.2 cm and the left lobe  measures 5.2 x 1.4 x 1.8 cm. Multiple mildly hypoechoic nodules are  identified within both lobes. The largest nodule on the left is  located anteriorly in the mid left lobe measuring 8 x 7 x 2 mm. There  are multiple larger nodules identified in the right thyroid lobe  measuring up to 13 x 12 x 10 mm located posteriorly in the mid to  lower right lobe. The thyroid isthmus measures 2 mm in AP diameter.  ?  IMPRESSION: Bilateral thyroid nodules consistent with  multinodular  goiter.     [1]?ENT Clinic Note; Aiden CHRISTOPHER, Lennie HODGSON. 07/18/2017 10:24 CDT   The patients note was reviewed by me and I agree with the residents assessment and plan.

## 2022-05-17 ENCOUNTER — OFFICE VISIT (OUTPATIENT)
Dept: ORTHOPEDICS | Facility: CLINIC | Age: 37
End: 2022-05-17
Payer: MEDICAID

## 2022-05-17 ENCOUNTER — HOSPITAL ENCOUNTER (OUTPATIENT)
Dept: RADIOLOGY | Facility: HOSPITAL | Age: 37
Discharge: HOME | End: 2022-05-17
Attending: STUDENT IN AN ORGANIZED HEALTH CARE EDUCATION/TRAINING PROGRAM
Payer: MEDICAID

## 2022-05-17 VITALS
DIASTOLIC BLOOD PRESSURE: 87 MMHG | BODY MASS INDEX: 26.52 KG/M2 | WEIGHT: 159.19 LBS | SYSTOLIC BLOOD PRESSURE: 142 MMHG | HEART RATE: 80 BPM | HEIGHT: 65 IN

## 2022-05-17 DIAGNOSIS — M65.4 DE QUERVAIN'S TENOSYNOVITIS, BILATERAL: Primary | ICD-10-CM

## 2022-05-17 DIAGNOSIS — M25.531 BILATERAL WRIST PAIN: ICD-10-CM

## 2022-05-17 DIAGNOSIS — M25.532 BILATERAL WRIST PAIN: ICD-10-CM

## 2022-05-17 PROCEDURE — 73110 X-RAY EXAM OF WRIST: CPT | Mod: TC,LT

## 2022-05-17 PROCEDURE — 99213 OFFICE O/P EST LOW 20 MIN: CPT | Mod: PBBFAC

## 2022-05-17 PROCEDURE — 73110 X-RAY EXAM OF WRIST: CPT | Mod: TC,RT

## 2022-05-17 RX ORDER — METFORMIN HYDROCHLORIDE 500 MG/1
TABLET ORAL
COMMUNITY
Start: 2022-04-02 | End: 2022-08-02 | Stop reason: SDUPTHER

## 2022-05-17 RX ORDER — MELOXICAM 7.5 MG/1
7.5 TABLET ORAL DAILY
Qty: 14 TABLET | Refills: 0 | Status: SHIPPED | OUTPATIENT
Start: 2022-05-17 | End: 2022-10-18

## 2022-05-17 RX ORDER — VENLAFAXINE HYDROCHLORIDE 75 MG/1
CAPSULE, EXTENDED RELEASE ORAL
COMMUNITY
Start: 2022-05-06 | End: 2022-05-18

## 2022-05-17 RX ORDER — CHOLECALCIFEROL (VITAMIN D3) 50 MCG
2000 TABLET ORAL
COMMUNITY

## 2022-05-17 RX ORDER — DICLOFENAC SODIUM 10 MG/G
2 GEL TOPICAL 4 TIMES DAILY
Qty: 1 EACH | Refills: 2 | OUTPATIENT
Start: 2022-05-17 | End: 2022-07-06

## 2022-05-17 NOTE — PROGRESS NOTES
"Subjective:    Patient ID: Ruby Angel is a right handed 37 y.o. female  who presented to [facility] for new visit..      Chief Complaint: Pain of the Left Wrist and Pain of the Right Wrist      History of Present Illness:    Ruby Angel presented today with bilateral wrist pain for a few months. She has had wrist pain ever since she was pregnant, and it has continued after pregnancy.   Pain is located at volar wrist, and rated 3/10 at rest, and 6/10 with activity. Quality of pain is described as sharp.  Radiates to hand. Pain is aggravated by carrying, repetitive use .    She went to the ED on 4/8/22 for the wrist pain. Note reviewed. There was concern for Carpal Tunnel Syndrome, a nerve study was ordered, and she was discharged in stable condition.   She has tried Neurontin, Meloxicam without relief.     Today she reports continued pain.     Hand Review of Systems:  Swelling?  no  Instability?  no  Clicking?  no  Limited ROM? no  Fever/Chills? no  Subluxation? no  Dislocation? no  Numbness/Tingling? yes  Weakness? no      Review of Systems   All other systems reviewed and are negative.         Objective:      Physical Exam:    BP (!) 142/87 (BP Location: Right arm)   Pulse 80   Ht 5' 4.96" (1.65 m)   Wt 72.2 kg (159 lb 2.8 oz)   BMI 26.52 kg/m²     Ortho/SPM Exam    Appearance:  Soft tissue swelling: Left: no Right: no  Effusion: Left:  Negative Right: Negative  Erythema: Left no Right: no  Ecchymosis: Left: no Right: no  Atrophy: Left: no Right: no    Palpation:  Hand/wrist Tenderness: Left: radial wrist  Right: radial wrist    Range of motion:  Flexion (0-80): Left:  80 Right: 80  Extension (0-70): Left:  70 Right: 70  Ulnar deviation (0-30): 30 Right: 30  Radial deviation (0-20): 20 Right: 20  Supination (0-90): Left: 90 Right: 90  Pronation (0-90): Left: 90 Right: 90  Able to make a power fist and claw hand: on Both " hand(s)      Strength:  Flexion: Left: 5/5 Pain: no Right: 5/5 Pain: no  Extension: Left: 5/5 Pain: no Right: 5/5 Pain: no  Supination: Left: 5/5 Pain: no Right: 5/5 Pain: no  Pronation: Left: 5/5 Pain: no Right: 5/5 Pain: no  Ulnar deviation: Left: 5/5 Pain: no Right: 5/5 Pain: no  Radial deviation: Left: 5/5 Pain: no Right: 5/5 Pain: no    Special Tests:  Tinels: Left: Negative Right: Negative    Phalens: Left: Negative Right: Negative    Reverse Phalens: Left: Not performed Right: Not performed  Finkelstein's Test: Left: Positive Right: Positive        AIN/PIN/Radial nerve: Intact and symmetric    General appearance: NAD  Peripheral pulses: normal bilaterally   Reflexes: Left: Not performed Right: Not performed   Sensation: normal      Imaging:   Previous images reviewed.   Xray Right Wrist 1/3/22:   FINDINGS:  Bone mineralization is normal. There is no acute fracture or  malalignment. There are no erosive bone changes. The joint spaces are  preserved. The soft tissues are unremarkable.     IMPRESSION:  No acute bony abnormality    Xray left wrist 1/3/22:   FINDINGS:   No acute fracture or malalignment is identified of the left wrist. The  joint spaces of the left wrist are maintained.   No acute fracture or malalignment is identified of the left hand. The  joint spaces of the left hand are maintained.  There is no erosion, aggressive-appearing bone lesion, or abnormal  periosteal reaction. No soft tissue gas or calcification. Bone  mineralization is maintained.    IMPRESSION:  No acute osseous abnormality of the left wrist or left hand.    X-rays ordered and performed today: yes  # of views: 3 Laterality: bilateral  My Interpretation:  Distal Radial ulnar joint space is overall Normal on AP views. Scapholunate interval distance is Normal on bilateral AP views.  no fracture, dislocation, swelling or degenerative changes noted      Assessment:        Encounter Diagnoses   Name Primary?    Bilateral wrist pain      De Quervain's tenosynovitis, bilateral Yes        Plan:       Dx: bilateral. De Quervain's Tenosynovitis Acute in moderate exacerbation.   Treatment Plan: Discussed with patient diagnosis and treatment recommendations. Natural history and expected course discussed. Questions answered.  Educational material distributed.  Reduction in offending activity.  Gentle ROM exercises.  Rest, ice, compression, and elevation (RICE) therapy.  Plain film x-rays.  Home physical therapy exercise handouts provided to patient.   Over the counter NSAID and/or tylenol provided you do not have contraindications such as but not limited to liver or kidney disease or uncontrolled blood pressure. If you're doctors have told you to to not take them based on your health, do not take them.   Using a brace provided to you here or from your local pharmacy or durable medical equipment (DME) store.   Imaging: radiological studies ordered and independently reviewed; discussed with patient; pending radiologist interpretation.   Procedure: Discussed CSI/VSI as treatment options; discussed CSI vs VS injections as treatment options in future if conservative measures do not improve symptoms.  Medication: first line treatment with daily acetaminophen. Up to 1000 mg three times daily can be taken; medication precautions given. and start Mobic 7.5mg daily x14 days. Start Voltaren Gel QID PRN; medication precautions given. Please see your primary care physician for further refills.  RTC: PRN; call if any issues.

## 2022-05-18 ENCOUNTER — OFFICE VISIT (OUTPATIENT)
Dept: INTERNAL MEDICINE | Facility: CLINIC | Age: 37
End: 2022-05-18
Payer: MEDICAID

## 2022-05-18 DIAGNOSIS — G47.00 INSOMNIA, UNSPECIFIED TYPE: ICD-10-CM

## 2022-05-18 DIAGNOSIS — F32.A DEPRESSION, UNSPECIFIED DEPRESSION TYPE: ICD-10-CM

## 2022-05-18 DIAGNOSIS — F41.9 ANXIETY: Primary | ICD-10-CM

## 2022-05-18 PROCEDURE — 1160F PR REVIEW ALL MEDS BY PRESCRIBER/CLIN PHARMACIST DOCUMENTED: ICD-10-PCS | Mod: CPTII,95,, | Performed by: NURSE PRACTITIONER

## 2022-05-18 PROCEDURE — 1159F MED LIST DOCD IN RCRD: CPT | Mod: CPTII,95,, | Performed by: NURSE PRACTITIONER

## 2022-05-18 PROCEDURE — 1160F RVW MEDS BY RX/DR IN RCRD: CPT | Mod: CPTII,95,, | Performed by: NURSE PRACTITIONER

## 2022-05-18 PROCEDURE — 99212 OFFICE O/P EST SF 10 MIN: CPT | Mod: 95,SA,HB, | Performed by: NURSE PRACTITIONER

## 2022-05-18 PROCEDURE — 1159F PR MEDICATION LIST DOCUMENTED IN MEDICAL RECORD: ICD-10-PCS | Mod: CPTII,95,, | Performed by: NURSE PRACTITIONER

## 2022-05-18 PROCEDURE — 99212 PR OFFICE/OUTPT VISIT, EST, LEVL II, 10-19 MIN: ICD-10-PCS | Mod: 95,SA,HB, | Performed by: NURSE PRACTITIONER

## 2022-05-18 RX ORDER — MIRTAZAPINE 15 MG/1
15 TABLET, FILM COATED ORAL NIGHTLY
COMMUNITY
End: 2022-05-18 | Stop reason: SDUPTHER

## 2022-05-18 RX ORDER — MIRTAZAPINE 15 MG/1
15 TABLET, FILM COATED ORAL NIGHTLY
Qty: 30 TABLET | Refills: 3 | Status: SHIPPED | OUTPATIENT
Start: 2022-05-18 | End: 2022-07-18 | Stop reason: SDUPTHER
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
## 2022-06-07 ENCOUNTER — OFFICE VISIT (OUTPATIENT)
Dept: ORTHOPEDICS | Facility: CLINIC | Age: 37
End: 2022-06-07
Payer: MEDICAID

## 2022-06-07 VITALS — HEIGHT: 64 IN | WEIGHT: 165 LBS | BODY MASS INDEX: 28.17 KG/M2

## 2022-06-07 DIAGNOSIS — M65.4 DE QUERVAIN'S TENOSYNOVITIS, LEFT: Primary | ICD-10-CM

## 2022-06-07 DIAGNOSIS — M65.4 DE QUERVAIN'S TENOSYNOVITIS, RIGHT: ICD-10-CM

## 2022-06-07 PROCEDURE — 99213 OFFICE O/P EST LOW 20 MIN: CPT | Mod: PBBFAC,25

## 2022-06-07 PROCEDURE — 20550 NJX 1 TENDON SHEATH/LIGAMENT: CPT | Mod: 50,PBBFAC | Performed by: STUDENT IN AN ORGANIZED HEALTH CARE EDUCATION/TRAINING PROGRAM

## 2022-06-07 RX ORDER — TRIAMCINOLONE ACETONIDE 40 MG/ML
40 INJECTION, SUSPENSION INTRA-ARTICULAR; INTRAMUSCULAR
Status: DISCONTINUED | OUTPATIENT
Start: 2022-06-07 | End: 2022-08-02

## 2022-06-07 RX ORDER — LIDOCAINE HYDROCHLORIDE 10 MG/ML
1 INJECTION, SOLUTION EPIDURAL; INFILTRATION; INTRACAUDAL; PERINEURAL
Status: DISCONTINUED | OUTPATIENT
Start: 2022-06-07 | End: 2022-08-02

## 2022-06-07 NOTE — PROGRESS NOTES
Subjective:          Chief Complaint: Ruby Angel is a 37 y.o. female who had concerns including Pain of the Right Wrist, Pain of the Left Wrist, and Follow-up.    HPI    Ruby Angel presented today with bilateral wrist pain for a few months. She has had wrist pain ever since she was pregnant, and it has continued after pregnancy.   Pain is located at volar wrist, and rated 3/10 at rest, and 6/10 with activity. Quality of pain is described as sharp.  Radiates to hand. Pain is aggravated by carrying, repetitive use .She went to the ED on 4/8/22 for the wrist pain. Note reviewed. There was concern for Carpal Tunnel Syndrome, a nerve study was ordered, and she was discharged in stable condition.   She has tried Neurontin, Meloxicam without relief.      Today she reports she had tried conservative measures after her diagnosis of bilateral  dequervains w/o improvement in her pain and she is requesting a CSI in each wrist today. She is difficulty completing her ADLs. Denies any new trauma or falls.     ROS  As per hpi               Objective:        There were no vitals filed for this visit.    General: Ruby is well-developed, well-nourished, appears stated age, in no acute distress, alert and oriented to time, place and person.                 Right Hand/Wrist Exam     Inspection   Effusion: Wrist - absent Hand -  absent  Bruising: Wrist - absent Hand -  absent  Deformity: Wrist - deformity Hand -  deformity    Range of Motion     Wrist   Extension: normal   Flexion: normal   Pronation: normal   Supination: normal     Tests   Finkelstein's test: positive  Carpal Tunnel Compression Test: negative  Cubital Tunnel Compression Test: negative  Bunnel-Littler Test: abnormal right intrinsic tightness test    Atrophy   Thenar:  negative  Hypothenar:  negative    Other     Neuorologic Exam    Median Distribution: normal  Ulnar Distribution: normal  Radial Distribution: normal      Left Hand/Wrist Exam     Inspection    Effusion: Wrist - absent Hand -  absent  Bruising: Wrist - absent Hand -  absent  Deformity: Wrist - absent Hand -  absent    Range of Motion     Wrist   Extension: normal   Flexion: normal   Pronation: normal   Supination: normal     Tests   Finkelstein's test: positive  Carpal Tunnel Compression Test: negative  Cubital Tunnel Compression Test: negative  Bunnel-Littler Test: normal    Atrophy  Thenar:  Negative  Hypothenar:  negative    Other     Sensory Exam  Median Distribution: normal  Ulnar Distribution: normal  Radial Distribution: normal          Muscle Strength   Right Upper Extremity   Wrist extension: 5/5   Wrist flexion: 5/5   Index Finger: 5/5  Middle Finger: 5/5  Ring Finger: 5/5  Little Finger: 5/5  Thumb - FPL: 5/5  Left Upper Extremity  Wrist extension: 5/5   Wrist flexion: 5/5   Index Finger: 5/5  Middle Finger: 5/5  Ring Finger: 5/5  Little Finger: 5/5  Thumb - FPL: 5/5    Vascular Exam       Capillary Refill  Right Hand: normal capillary refill  Left Hand: normal capillary refill          xrays of wrist left and right from 5/2022  FINDINGS:  No acute displaced fractures or dislocations.     Joint spaces preserved.     No blastic or lytic lesions.     Soft tissues within normal limits.     Impression:     No acute osseous abnormality.  FINDINGS:  No acute displaced fractures or dislocations.     Joint spaces preserved.     No blastic or lytic lesions.     Soft tissues within normal limits.     Impression:     No acute osseous abnormality.            X-rays ordered and performed today: no  # of views: 3 Laterality: bilateral  My Interpretation:  Distal Radial ulnar joint space is overall Normal on AP views. Scapholunate interval distance is Normal on bilateral AP views.  no fracture, dislocation, swelling or degenerative changes noted        Assessment:       Encounter Diagnoses   Name Primary?    De Quervain's tenosynovitis, left Yes    De Quervain's tenosynovitis, right           Plan:        Dx: Bilateral deQuervain tenosynovitis that has not improved with conservative measures. Acute in moderate exacerbation. Discussed diagnoses and treatment recommendations with the patient. Handout given.   Imaging: prior radiological studies independently reviewed; discussed with patient; agree with radiologist interpretation.  Treatment Plan: Conservative treatment failed;B/l CSI performed today.  Procedure: since conservative measures did not improve symptoms, patient consented for CSI today.  Activity: Activity modification discussed.   Therapy: HEP  Medication: First line treatment with daily Acetaminophen 1000mg TID; medication precautions given   RTC: PRN; As needed                    Patient questionnaires may have been collected.

## 2022-06-07 NOTE — PROGRESS NOTES
Faculty Attestation: Ruby Angel  was seen in [facility]. Discussed with Dr. Mcdaniels at the time of the visit. History of Present Illness, Physical Exam, and Assessment and Plan reviewed. Treatment plan is reasonable and appropriate. Compliance with treatment recommendations is important.  Radiology images independently reviewed and agree with fellow interpretation.  Procedure note reviewed. Patient tolerated procedure well.      Soham Green MD

## 2022-06-07 NOTE — PROGRESS NOTES
[facility] Procedure Note     Date: 06/07/2022  Time: 10:20 AM CDT     Procedure: bilateral De Quervain's Injection     Indications: Therapeutic Indication - Decrease pain, Increase range of motion and improve quality of life:   Risks:  Possible complications with the injection include bleeding, infection (.01%), tendon rupture, steroid flare, fat pad or soft tissue atrophy, skin depigmentation, allergic reaction to medications and vasovagal response. (steroid flare treatment is rest, ice, NSAIDs and resolves in 24-36 hours.)  Consent:  Procedure, risks, benefits, and alternatives explained the patient, who voiced understanding and agreed to proceed with procedure.  Consent signed and scanned into the medical record.   No absolute contraindications (cellulitis overlying joint, infection, lack of informed consent, allergy to injection medication, AVN protein or egg allergy for sodium hyaluronate, or history of steroid flare) or relative contraindications (uncontrolled DM2 A1c>10, coagulopathy, INR > 3.5, previous joint replacement or history of AVN).        Staff: Soham Green MD   Description:  Time-out performed.  The patient was prepped in normal sterile fashion use of chlorhexidine scrub and the appropriate and anatomic landmarks were identified with ultrasound.  Sterile 21 gauge 1 inch  was used. Topical ethyl chloride was applied. Contents of syringe included: 1cc of 1% lidocaine with 40mg of Kenalog   Complications: None   EBL: None   Post Procedure: Patient alert, and moving all extremities. ROM improved, pain decreased.  Good peripheral pulses, no signs of vascular compromise and range of motion intact.  Aftercare instructions were given to patient at time of discharge.  Relative rest for 3 days-avoiding excess activity.  Place ice on the area for 15 minutes every 4-6 hours. Patient may take Tylenol a 1000 mg b.i.d. or ibuprofen 600 mg t.i.d. for the next 3-4 days if not on medication already and safe to take  pending co-morbidities.  Protect the area for the next 1-8 hours if anesthetic was used.  Avoid excessive activity for the next 3-4 weeks.  ER precautions given for fever, severe joint pain or allergic reaction or other new symptoms related to the joint injection.

## 2022-07-06 ENCOUNTER — HOSPITAL ENCOUNTER (EMERGENCY)
Facility: HOSPITAL | Age: 37
Discharge: HOME | End: 2022-07-06
Attending: EMERGENCY MEDICINE
Payer: MEDICAID

## 2022-07-06 VITALS
DIASTOLIC BLOOD PRESSURE: 85 MMHG | HEART RATE: 89 BPM | OXYGEN SATURATION: 98 % | SYSTOLIC BLOOD PRESSURE: 145 MMHG | RESPIRATION RATE: 18 BRPM | TEMPERATURE: 98 F

## 2022-07-06 DIAGNOSIS — S39.012A STRAIN OF LUMBAR REGION, INITIAL ENCOUNTER: Primary | ICD-10-CM
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
## 2022-07-06 LAB
B-HCG UR QL: NEGATIVE
CTP QC/QA: YES

## 2022-07-06 PROCEDURE — 81025 URINE PREGNANCY TEST: CPT | Performed by: NURSE PRACTITIONER

## 2022-07-06 PROCEDURE — 99284 EMERGENCY DEPT VISIT MOD MDM: CPT | Mod: 25

## 2022-07-06 RX ORDER — BACLOFEN 10 MG/1
10 TABLET ORAL 3 TIMES DAILY
Qty: 21 TABLET | Refills: 0 | Status: SHIPPED | OUTPATIENT
Start: 2022-07-06 | End: 2022-10-18

## 2022-07-06 RX ORDER — DICLOFENAC SODIUM 10 MG/G
2 GEL TOPICAL 3 TIMES DAILY PRN
Qty: 20 G | Refills: 0 | OUTPATIENT
Start: 2022-07-06 | End: 2024-02-19
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
## 2022-07-18 ENCOUNTER — OFFICE VISIT (OUTPATIENT)
Dept: INTERNAL MEDICINE | Facility: CLINIC | Age: 37
End: 2022-07-18
Payer: MEDICAID

## 2022-07-18 DIAGNOSIS — F33.1 MODERATE EPISODE OF RECURRENT MAJOR DEPRESSIVE DISORDER: ICD-10-CM

## 2022-07-18 DIAGNOSIS — G47.00 INSOMNIA, UNSPECIFIED TYPE: ICD-10-CM

## 2022-07-18 DIAGNOSIS — F41.9 ANXIETY: Primary | ICD-10-CM

## 2022-07-18 PROCEDURE — 99213 PR OFFICE/OUTPT VISIT, EST, LEVL III, 20-29 MIN: ICD-10-PCS | Mod: 95,SA,HB, | Performed by: NURSE PRACTITIONER

## 2022-07-18 PROCEDURE — 1160F RVW MEDS BY RX/DR IN RCRD: CPT | Mod: CPTII,95,, | Performed by: NURSE PRACTITIONER

## 2022-07-18 PROCEDURE — 1160F PR REVIEW ALL MEDS BY PRESCRIBER/CLIN PHARMACIST DOCUMENTED: ICD-10-PCS | Mod: CPTII,95,, | Performed by: NURSE PRACTITIONER

## 2022-07-18 PROCEDURE — 1159F MED LIST DOCD IN RCRD: CPT | Mod: CPTII,95,, | Performed by: NURSE PRACTITIONER

## 2022-07-18 PROCEDURE — 99213 OFFICE O/P EST LOW 20 MIN: CPT | Mod: 95,SA,HB, | Performed by: NURSE PRACTITIONER

## 2022-07-18 PROCEDURE — 1159F PR MEDICATION LIST DOCUMENTED IN MEDICAL RECORD: ICD-10-PCS | Mod: CPTII,95,, | Performed by: NURSE PRACTITIONER

## 2022-07-18 RX ORDER — MIRTAZAPINE 15 MG/1
15 TABLET, FILM COATED ORAL NIGHTLY
Qty: 30 TABLET | Refills: 4 | Status: SHIPPED | OUTPATIENT
Start: 2022-07-18 | End: 2022-10-18
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
## 2022-08-02 ENCOUNTER — OFFICE VISIT (OUTPATIENT)
Dept: INTERNAL MEDICINE | Facility: CLINIC | Age: 37
End: 2022-08-02
Payer: MEDICAID

## 2022-08-02 VITALS
HEIGHT: 64 IN | SYSTOLIC BLOOD PRESSURE: 139 MMHG | RESPIRATION RATE: 16 BRPM | DIASTOLIC BLOOD PRESSURE: 89 MMHG | TEMPERATURE: 99 F | HEART RATE: 72 BPM | WEIGHT: 158.63 LBS | BODY MASS INDEX: 27.08 KG/M2

## 2022-08-02 DIAGNOSIS — M65.4 DE QUERVAIN'S TENOSYNOVITIS: ICD-10-CM

## 2022-08-02 DIAGNOSIS — N93.9 ABNORMAL VAGINAL BLEEDING: ICD-10-CM

## 2022-08-02 DIAGNOSIS — F32.A DEPRESSION, UNSPECIFIED DEPRESSION TYPE: ICD-10-CM

## 2022-08-02 DIAGNOSIS — R73.03 PREDIABETES: ICD-10-CM

## 2022-08-02 DIAGNOSIS — E04.2 MULTINODULAR THYROID: ICD-10-CM

## 2022-08-02 DIAGNOSIS — E28.2 PCOS (POLYCYSTIC OVARIAN SYNDROME): ICD-10-CM

## 2022-08-02 DIAGNOSIS — E55.9 VITAMIN D DEFICIENCY: ICD-10-CM

## 2022-08-02 DIAGNOSIS — O10.919 CHRONIC HYPERTENSION IN PREGNANCY: Primary | ICD-10-CM

## 2022-08-02 PROBLEM — N92.0 MENORRHAGIA: Status: RESOLVED | Noted: 2022-08-02 | Resolved: 2022-08-02

## 2022-08-02 PROBLEM — O99.342 ANXIETY IN PREGNANCY, ANTEPARTUM, SECOND TRIMESTER: Status: ACTIVE | Noted: 2021-07-07

## 2022-08-02 PROBLEM — F41.9 ANXIETY IN PREGNANCY, ANTEPARTUM, SECOND TRIMESTER: Status: RESOLVED | Noted: 2021-07-07 | Resolved: 2022-08-02

## 2022-08-02 PROBLEM — O99.342 ANXIETY IN PREGNANCY, ANTEPARTUM, SECOND TRIMESTER: Status: RESOLVED | Noted: 2021-07-07 | Resolved: 2022-08-02

## 2022-08-02 PROBLEM — N92.0 MENORRHAGIA: Status: ACTIVE | Noted: 2022-08-02

## 2022-08-02 PROBLEM — Z87.891 HISTORY OF TOBACCO ABUSE: Status: ACTIVE | Noted: 2022-08-02

## 2022-08-02 PROBLEM — F41.9 ANXIETY IN PREGNANCY, ANTEPARTUM, SECOND TRIMESTER: Status: ACTIVE | Noted: 2021-07-07

## 2022-08-02 PROBLEM — F41.9 ANXIETY: Status: ACTIVE | Noted: 2022-08-02

## 2022-08-02 PROCEDURE — 1160F PR REVIEW ALL MEDS BY PRESCRIBER/CLIN PHARMACIST DOCUMENTED: ICD-10-PCS | Mod: CPTII,,, | Performed by: NURSE PRACTITIONER

## 2022-08-02 PROCEDURE — 3008F BODY MASS INDEX DOCD: CPT | Mod: CPTII,,, | Performed by: NURSE PRACTITIONER

## 2022-08-02 PROCEDURE — 1160F RVW MEDS BY RX/DR IN RCRD: CPT | Mod: CPTII,,, | Performed by: NURSE PRACTITIONER

## 2022-08-02 PROCEDURE — 3075F SYST BP GE 130 - 139MM HG: CPT | Mod: CPTII,,, | Performed by: NURSE PRACTITIONER

## 2022-08-02 PROCEDURE — 99214 OFFICE O/P EST MOD 30 MIN: CPT | Mod: S$PBB,,, | Performed by: NURSE PRACTITIONER

## 2022-08-02 PROCEDURE — 1159F MED LIST DOCD IN RCRD: CPT | Mod: CPTII,,, | Performed by: NURSE PRACTITIONER

## 2022-08-02 PROCEDURE — 3079F DIAST BP 80-89 MM HG: CPT | Mod: CPTII,,, | Performed by: NURSE PRACTITIONER

## 2022-08-02 PROCEDURE — 99214 OFFICE O/P EST MOD 30 MIN: CPT | Mod: PBBFAC | Performed by: NURSE PRACTITIONER

## 2022-08-02 PROCEDURE — 1159F PR MEDICATION LIST DOCUMENTED IN MEDICAL RECORD: ICD-10-PCS | Mod: CPTII,,, | Performed by: NURSE PRACTITIONER

## 2022-08-02 PROCEDURE — 99214 PR OFFICE/OUTPT VISIT, EST, LEVL IV, 30-39 MIN: ICD-10-PCS | Mod: S$PBB,,, | Performed by: NURSE PRACTITIONER

## 2022-08-02 PROCEDURE — 3075F PR MOST RECENT SYSTOLIC BLOOD PRESS GE 130-139MM HG: ICD-10-PCS | Mod: CPTII,,, | Performed by: NURSE PRACTITIONER

## 2022-08-02 PROCEDURE — 3008F PR BODY MASS INDEX (BMI) DOCUMENTED: ICD-10-PCS | Mod: CPTII,,, | Performed by: NURSE PRACTITIONER

## 2022-08-02 PROCEDURE — 3079F PR MOST RECENT DIASTOLIC BLOOD PRESSURE 80-89 MM HG: ICD-10-PCS | Mod: CPTII,,, | Performed by: NURSE PRACTITIONER

## 2022-08-02 RX ORDER — METFORMIN HYDROCHLORIDE 500 MG/1
500 TABLET ORAL
Qty: 90 TABLET | Refills: 3 | Status: SHIPPED | OUTPATIENT
Start: 2022-08-02 | End: 2024-03-12

## 2022-08-02 NOTE — ASSESSMENT & PLAN NOTE
She reports being evaluated by [facility] with steroid injections to b/l wrist with relief.

## 2022-08-02 NOTE — PROGRESS NOTES
Ania L Kylie, NP   [facility]  [address] [facility] [address]      PATIENT NAME: Ruby Angel  : 1985  DATE: 22  MRN: [id number]      Billing Provider: Ania Espinal NP  Level of Service: NM OFFICE/OUTPT VISIT, EST, LEVL IV, 30-39 MIN  Patient PCP Information     Provider PCP Type    Ania Espinal NP General          Reason for Visit / Chief Complaint: Follow-up (hypertension) and Menstrual Problem (Cycles lasting several weeks)       History of Present Illness / Problem Focused Workflow     Ruby Angel presents to the clinic with Follow-up (hypertension) and Menstrual Problem (Cycles lasting several weeks)     38 yo AAF for 6 mo follow up. PMh includes HTN, PCOS, IGT, depression, anxiety, vitamin D deficiency, thyroid nodules, chronic constipation, h/o tobacco abuse. Overall feeling well. /89. She reports readings have been elevated such as with other office visits/ ER visits. Has home BP monitor. Has not been checking. She states b/l wrist pain resolved after receiving injections in Orthopedic clinic. Reports menstrual bleeding for the last 2 months. She states it started as normal menstrual cycle and continued with spotting. Denies sexual intercourse within the last 6 months. She feels mood is stable and continues to see mental health provider for such. She has follow up with ENT next year with labs and US. Bowel movements normal. She denies any other concerns/ complaints.     Other providers  [facility] PNP  [facility]  Dr. Crow      Review of Systems     Review of Systems   Constitutional: Negative.    HENT: Negative.    Eyes: Negative.    Respiratory: Negative.    Cardiovascular: Negative.    Gastrointestinal: Negative.    Endocrine: Negative.    Genitourinary: Positive for vaginal bleeding.   Musculoskeletal: Negative.    Skin: Negative.    Allergic/Immunologic: Negative.    Neurological: Negative.     Hematological: Negative.    Psychiatric/Behavioral: Negative.        Medical / Social / Family History     Past Medical History:   Diagnosis Date    Anxiety     Depression        Past Surgical History:   Procedure Laterality Date     SECTION       SECTION      COLONOSCOPY W/ BIOPSIES AND POLYPECTOMY  2018    DILATION AND CURETTAGE OF UTERUS  2018       Social History  Ms. Beltran  reports that she quit smoking about 3 years ago. Her smoking use included cigarettes. She has never used smokeless tobacco. She reports previous alcohol use. She reports that she does not use drugs.    Family History  Ms. Beltran's family history includes Autoimmune disease in her sister; Diabetes in her father; Hypertension in her father and mother; Kidney disease in her father; Kidney failure in her brother.    Medications and Allergies     Medications  Medication List with Changes/Refills   Current Medications    BACLOFEN (LIORESAL) 10 MG TABLET    Take 1 tablet (10 mg total) by mouth 3 (three) times daily. for 7 days    CHOLECALCIFEROL, VITAMIN D3, (VITAMIN D3) 50 MCG (2,000 UNIT) TAB    Take 2,000 Units by mouth.    DICLOFENAC SODIUM (VOLTAREN) 1 % GEL    Apply 2 g topically 3 (three) times daily as needed (pain).    MELOXICAM (MOBIC) 7.5 MG TABLET    Take 1 tablet (7.5 mg total) by mouth once daily.    MIRTAZAPINE (REMERON) 15 MG TABLET    Take 1 tablet (15 mg total) by mouth every evening.    OMEPRAZOLE (PRILOSEC) 40 MG CAPSULE    Take 40 mg by mouth every morning.    TRAZODONE (DESYREL) 50 MG TABLET    Take 25-50 mg by mouth nightly as needed.   Changed and/or Refilled Medications    Modified Medication Previous Medication    METFORMIN (GLUCOPHAGE) 500 MG TABLET metFORMIN (GLUCOPHAGE) 500 MG tablet       Take 1 tablet (500 mg total) by mouth daily with breakfast.    TAKE 1 TABLET BY MOUTH EVERY DAY WITH MEALS       Allergies  Review of patient's allergies indicates:   Allergen Reactions    Benadryl  allergy-sinus Shortness Of Breath    Diphenhydramine hcl Swelling    Diphenhyd-pe-acetaminophen        Physical Examination     Vitals:    08/02/22 0810   BP: 139/89   Pulse: 72   Resp: 16   Temp: 98.5 °F (36.9 °C)     Physical Exam  Vitals and nursing note reviewed.   Constitutional:       Appearance: Normal appearance. She is not ill-appearing.   HENT:      Head: Normocephalic.      Right Ear: Tympanic membrane normal.      Left Ear: Tympanic membrane normal.      Nose: Nose normal.      Mouth/Throat:      Mouth: Mucous membranes are moist.   Eyes:      Extraocular Movements: Extraocular movements intact.      Conjunctiva/sclera: Conjunctivae normal.      Pupils: Pupils are equal, round, and reactive to light.   Cardiovascular:      Rate and Rhythm: Normal rate and regular rhythm.      Pulses: Normal pulses.   Pulmonary:      Effort: Pulmonary effort is normal. No respiratory distress.      Breath sounds: Normal breath sounds.   Abdominal:      General: Bowel sounds are normal. There is no distension.      Palpations: Abdomen is soft. There is no mass.      Tenderness: There is no abdominal tenderness.      Hernia: No hernia is present.   Musculoskeletal:         General: Normal range of motion.      Cervical back: Normal range of motion and neck supple.      Right lower leg: No edema.      Left lower leg: No edema.   Skin:     General: Skin is warm and dry.      Capillary Refill: Capillary refill takes less than 2 seconds.   Neurological:      Mental Status: She is alert and oriented to person, place, and time. Mental status is at baseline.      Motor: No weakness.   Psychiatric:         Mood and Affect: Mood normal.         Behavior: Behavior normal.         Thought Content: Thought content normal.         Judgment: Judgment normal.           Results     Lab Results   Component Value Date    WBC 6.8 11/03/2021    RBC 3.91 (L) 11/03/2021    HGB 11.0 (L) 11/03/2021    HCT 34.7 (L) 11/03/2021    MCV 88.7  11/03/2021    MCH 28.1 11/03/2021    MCHC 31.7 (L) 11/03/2021    RDW 15.1 11/03/2021     11/03/2021    MPV 10.2 11/03/2021     CMP  Sodium Level   Date Value Ref Range Status   12/09/2021 138 136 - 145 mmol/L Final     Potassium Level   Date Value Ref Range Status   12/09/2021 4.0 3.5 - 5.1 mmol/L Final     Carbon Dioxide   Date Value Ref Range Status   12/09/2021 27 22 - 29 mmol/L Final     Blood Urea Nitrogen   Date Value Ref Range Status   12/09/2021 6.2 (L) 7.0 - 18.7 mg/dL Final     Creatinine   Date Value Ref Range Status   12/09/2021 0.71 0.55 - 1.02 mg/dL Final     Calcium Level Total   Date Value Ref Range Status   12/09/2021 9.5 8.7 - 10.5 mg/dL Final     Albumin Level   Date Value Ref Range Status   12/09/2021 4.0 3.5 - 5.0 gm/dL Final     Bilirubin Total   Date Value Ref Range Status   12/09/2021 0.4 <<=1.5 mg/dL Final     Alkaline Phosphatase   Date Value Ref Range Status   12/09/2021 87 40 - 150 unit/L Final     Aspartate Aminotransferase   Date Value Ref Range Status   12/09/2021 31 5 - 34 unit/L Final     Alanine Aminotransferase   Date Value Ref Range Status   12/09/2021 29 0 - 55 unit/L Final     Estimated GFR-Non    Date Value Ref Range Status   12/09/2021 100 >>=90 mL/min/1.73 m2 Final     Lab Results   Component Value Date    CHOL 196 11/02/2020     Lab Results   Component Value Date    HDL 43 11/02/2020     No results found for: LDLCALC  Lab Results   Component Value Date    TRIG 83 11/02/2020     No results found for: CHOLHDL  Lab Results   Component Value Date    TSH 1.1445 01/03/2022     Lab Results   Component Value Date    PHUR 7.0 04/08/2021    PROTEINUA Negative 04/08/2021    GLUCUA Negative 04/08/2021    KETONESU Negative 04/08/2021    OCCULTUA Negative 04/08/2021    NITRITE Negative 04/08/2021    LEUKOCYTESUR Negative 04/08/2021           Assessment and Plan (including Health Maintenance)     Plan:         There are no preventive care reminders to display for  this patient.    Problem List Items Addressed This Visit        Psychiatric    Depression    Current Assessment & Plan     Stable. She is established with mental health provider. Continue with Remeron and keep follow up as scheduled.               Renal/    Abnormal vaginal bleeding    Current Assessment & Plan     Reports bleeding for 2 months. She states menstrual cycle started as regular but continues with spotting. She is established with [facility] and will call for an appointment.              Endocrine    Multinodular thyroid    Current Assessment & Plan     Pathology with benign findings March 2022.  She is established with [facility] ENT clinic, last visit March 23, 2022- keep follow up in a year with labs and US           Relevant Orders    Comprehensive Metabolic Panel    CBC Auto Differential    PCOS (polycystic ovarian syndrome)    Current Assessment & Plan     On Metformin for PCOS  Keep follow up with GYN as scheduled           Relevant Medications    metFORMIN (GLUCOPHAGE) 500 MG tablet    Other Relevant Orders    Comprehensive Metabolic Panel    CBC Auto Differential    Lipid Panel    Hemoglobin A1C    Prediabetes    Current Assessment & Plan     Continue Metformin 500 mg once daily. Labs due. Will return within 1 week.             Relevant Medications    metFORMIN (GLUCOPHAGE) 500 MG tablet    Other Relevant Orders    Comprehensive Metabolic Panel    CBC Auto Differential    Lipid Panel    Hemoglobin A1C       Obstetric    Chronic hypertension in pregnancy - Primary    Current Assessment & Plan     History of HTN after pregnancy. Was treated with Nifedipine. Reports BP have been running high at home/ other office visits. BP today 139/89. Asymptomatic. She will return in a month for BP check with home BP log for review. If readings are elevated will consider restarting medication.               Orthopedic    De Quervain's tenosynovitis    Current Assessment & Plan     She reports being evaluated by [facility]  Orthopedic clinic with steroid injections to b/l wrist with relief.              Other Visit Diagnoses     Vitamin D deficiency              Health Maintenance Topics with due status: Not Due       Topic Last Completion Date    TETANUS VACCINE 08/10/2021    Influenza Vaccine 10/11/2021    Cervical Cancer Screening 03/07/2022       Future Appointments   Date Time Provider Department Center   10/18/2022  9:00 AM SUMA Rodriguez [facility] [place]    3/2/2023  9:30 AM [facility] [place]    3/7/2023  9:50 AM AURE Lomax [facility] GYN [place] Un   3/28/2023  9:00 AM Leodan Robertson MD [facility] ENT [place]    8/2/2023  9:00 AM Ania Espinal NP [facility] [place] [facility]        Fasting labs are due; will return within 1 week.   RTC in 1 year with fasting labs due within 1 week prior to appointment.  Keep all outpatient testing appointments as well as other providers/ clinics as scheduled.  If at any time condition worsens or experience new symptoms/ concerns, please call clinic for sooner appointment or go to ED/UCC.       Signature:  Ania Espinal NP  [facility]  [address] [facility] [address]    Date of encounter: 8/2/22

## 2022-08-02 NOTE — ASSESSMENT & PLAN NOTE
Stable. She is established with mental health provider. Continue with Remeron and keep follow up as scheduled.

## 2022-08-02 NOTE — ASSESSMENT & PLAN NOTE
History of HTN after pregnancy. Was treated with Nifedipine. Reports BP have been running high at home/ other office visits. BP today 139/89. Asymptomatic. She will return in a month for BP check with home BP log for review. If readings are elevated will consider restarting medication.

## 2022-08-02 NOTE — ASSESSMENT & PLAN NOTE
Pathology with benign findings March 2022.  She is established with [facility] E[facility] clinic, last visit March 23, 2022- keep follow up in a year with labs and US

## 2022-08-02 NOTE — ASSESSMENT & PLAN NOTE
Reports bleeding for 2 months. She states menstrual cycle started as regular but continues with spotting. She is established with [facility] and will call for an appointment.

## 2022-09-18 ENCOUNTER — HISTORICAL (OUTPATIENT)
Dept: ADMINISTRATIVE | Facility: HOSPITAL | Age: 37
End: 2022-09-18
Payer: MEDICAID

## 2022-09-21 ENCOUNTER — HISTORICAL (OUTPATIENT)
Dept: ADMINISTRATIVE | Facility: HOSPITAL | Age: 37
End: 2022-09-21
Payer: MEDICAID

## 2022-09-28 ENCOUNTER — OFFICE VISIT (OUTPATIENT)
Dept: ORTHOPEDICS | Facility: CLINIC | Age: 37
End: 2022-09-28
Payer: MEDICAID

## 2022-09-28 ENCOUNTER — LAB VISIT (OUTPATIENT)
Dept: LAB | Facility: HOSPITAL | Age: 37
End: 2022-09-28
Attending: NURSE PRACTITIONER
Payer: MEDICAID

## 2022-09-28 ENCOUNTER — TELEPHONE (OUTPATIENT)
Dept: INTERNAL MEDICINE | Facility: CLINIC | Age: 37
End: 2022-09-28
Payer: MEDICAID

## 2022-09-28 VITALS
RESPIRATION RATE: 16 BRPM | BODY MASS INDEX: 26.46 KG/M2 | DIASTOLIC BLOOD PRESSURE: 77 MMHG | HEART RATE: 77 BPM | HEIGHT: 64 IN | WEIGHT: 155 LBS | SYSTOLIC BLOOD PRESSURE: 117 MMHG

## 2022-09-28 DIAGNOSIS — M65.4 DE QUERVAIN'S DISEASE (TENOSYNOVITIS): ICD-10-CM

## 2022-09-28 DIAGNOSIS — M65.4 TENOSYNOVITIS, DE QUERVAIN: Primary | ICD-10-CM

## 2022-09-28 DIAGNOSIS — F41.9 ANXIETY: Primary | ICD-10-CM

## 2022-09-28 DIAGNOSIS — R73.03 PREDIABETES: ICD-10-CM

## 2022-09-28 DIAGNOSIS — E04.2 MULTINODULAR THYROID: Primary | ICD-10-CM

## 2022-09-28 DIAGNOSIS — E04.2 MULTINODULAR THYROID: ICD-10-CM

## 2022-09-28 DIAGNOSIS — F32.A DEPRESSION, UNSPECIFIED DEPRESSION TYPE: ICD-10-CM

## 2022-09-28 DIAGNOSIS — Z87.891 HISTORY OF TOBACCO ABUSE: ICD-10-CM

## 2022-09-28 DIAGNOSIS — Z00.00 WELL ADULT EXAM: ICD-10-CM

## 2022-09-28 DIAGNOSIS — Z11.9 SCREENING EXAMINATION FOR INFECTIOUS DISEASE: ICD-10-CM

## 2022-09-28 DIAGNOSIS — O10.919 CHRONIC HYPERTENSION IN PREGNANCY: ICD-10-CM

## 2022-09-28 DIAGNOSIS — E28.2 PCOS (POLYCYSTIC OVARIAN SYNDROME): ICD-10-CM

## 2022-09-28 LAB
ALBUMIN SERPL-MCNC: 4.4 GM/DL (ref 3.5–5)
ALBUMIN/GLOB SERPL: 1 RATIO (ref 1.1–2)
ALP SERPL-CCNC: 58 UNIT/L (ref 40–150)
ALT SERPL-CCNC: 24 UNIT/L (ref 0–55)
AST SERPL-CCNC: 26 UNIT/L (ref 5–34)
BASOPHILS # BLD AUTO: 0.03 X10(3)/MCL (ref 0–0.2)
BASOPHILS NFR BLD AUTO: 0.4 %
BILIRUBIN DIRECT+TOT PNL SERPL-MCNC: 0.6 MG/DL
BUN SERPL-MCNC: 12.5 MG/DL (ref 7–18.7)
CALCIUM SERPL-MCNC: 9.5 MG/DL (ref 8.4–10.2)
CHLORIDE SERPL-SCNC: 103 MMOL/L (ref 98–107)
CHOLEST SERPL-MCNC: 189 MG/DL
CHOLEST/HDLC SERPL: 4 {RATIO} (ref 0–5)
CO2 SERPL-SCNC: 25 MMOL/L (ref 22–29)
CREAT SERPL-MCNC: 0.74 MG/DL (ref 0.55–1.02)
DEPRECATED CALCIDIOL+CALCIFEROL SERPL-MC: 45.6 NG/ML (ref 30–80)
EOSINOPHIL # BLD AUTO: 0.03 X10(3)/MCL (ref 0–0.9)
EOSINOPHIL NFR BLD AUTO: 0.4 %
ERYTHROCYTE [DISTWIDTH] IN BLOOD BY AUTOMATED COUNT: 13.1 % (ref 11.5–17)
EST. AVERAGE GLUCOSE BLD GHB EST-MCNC: 119.8 MG/DL
GFR SERPLBLD CREATININE-BSD FMLA CKD-EPI: >60 MLS/MIN/1.73/M2
GLOBULIN SER-MCNC: 4.3 GM/DL (ref 2.4–3.5)
GLUCOSE SERPL-MCNC: 81 MG/DL (ref 74–100)
HBA1C MFR BLD: 5.8 %
HCT VFR BLD AUTO: 44.6 % (ref 37–47)
HDLC SERPL-MCNC: 53 MG/DL (ref 35–60)
HGB BLD-MCNC: 14.9 GM/DL (ref 12–16)
HIV 1+2 AB+HIV1 P24 AG SERPL QL IA: NONREACTIVE
IMM GRANULOCYTES # BLD AUTO: 0.01 X10(3)/MCL (ref 0–0.04)
IMM GRANULOCYTES NFR BLD AUTO: 0.1 %
LDLC SERPL CALC-MCNC: 120 MG/DL (ref 50–140)
LYMPHOCYTES # BLD AUTO: 2.68 X10(3)/MCL (ref 0.6–4.6)
LYMPHOCYTES NFR BLD AUTO: 37.8 %
MCH RBC QN AUTO: 27.6 PG (ref 27–31)
MCHC RBC AUTO-ENTMCNC: 33.4 MG/DL (ref 33–36)
MCV RBC AUTO: 82.6 FL (ref 80–94)
MONOCYTES # BLD AUTO: 0.6 X10(3)/MCL (ref 0.1–1.3)
MONOCYTES NFR BLD AUTO: 8.5 %
NEUTROPHILS # BLD AUTO: 3.7 X10(3)/MCL (ref 2.1–9.2)
NEUTROPHILS NFR BLD AUTO: 52.8 %
NRBC BLD AUTO-RTO: 0 %
PLATELET # BLD AUTO: 249 X10(3)/MCL (ref 130–400)
PMV BLD AUTO: 10.4 FL (ref 7.4–10.4)
POTASSIUM SERPL-SCNC: 4 MMOL/L (ref 3.5–5.1)
PROT SERPL-MCNC: 8.7 GM/DL (ref 6.4–8.3)
RBC # BLD AUTO: 5.4 X10(6)/MCL (ref 4.2–5.4)
SODIUM SERPL-SCNC: 138 MMOL/L (ref 136–145)
TRIGL SERPL-MCNC: 82 MG/DL (ref 37–140)
VLDLC SERPL CALC-MCNC: 16 MG/DL
WBC # SPEC AUTO: 7.1 X10(3)/MCL (ref 4.5–11.5)

## 2022-09-28 PROCEDURE — 80074 ACUTE HEPATITIS PANEL: CPT

## 2022-09-28 PROCEDURE — 82306 VITAMIN D 25 HYDROXY: CPT

## 2022-09-28 PROCEDURE — 85025 COMPLETE CBC W/AUTO DIFF WBC: CPT

## 2022-09-28 PROCEDURE — 80061 LIPID PANEL: CPT

## 2022-09-28 PROCEDURE — 36415 COLL VENOUS BLD VENIPUNCTURE: CPT

## 2022-09-28 PROCEDURE — 87389 HIV-1 AG W/HIV-1&-2 AB AG IA: CPT

## 2022-09-28 PROCEDURE — 83036 HEMOGLOBIN GLYCOSYLATED A1C: CPT

## 2022-09-28 PROCEDURE — 80053 COMPREHEN METABOLIC PANEL: CPT

## 2022-09-28 PROCEDURE — 99214 OFFICE O/P EST MOD 30 MIN: CPT | Mod: PBBFAC

## 2022-09-28 RX ORDER — MELOXICAM 15 MG/1
15 TABLET ORAL DAILY
Qty: 30 TABLET | Refills: 0 | Status: SHIPPED | OUTPATIENT
Start: 2022-09-28 | End: 2022-10-18

## 2022-09-28 NOTE — PROGRESS NOTES
"Subjective:    Patient ID: Ruby Angel is a right handed 37 y.o. female  who presented to [facility] for follow up..      Chief Complaint: Pain of the Right Wrist and Follow-up      History of Present Illness:  Ruby Angel who has a history of De Quervain Tenosynovitis BL  presented today with De Quervain's Tenosynovitis involving the right upper extremity for the past 2 month. Pain is located at radial wrist. Quality of pain is described as aching and it radiates to to the thumb. Inciting event:  started during pregnancy and got worse after baby was delivered with repetitive picking up of the child .  Pain is aggravated by lifting and pain currently a 6/10. Has tried ibuprofen / tylenol and they have not been helping at all. . Treatment to date: oral analgesics and CSI. Expectations for today's visit includes pain relief.  Occupation includes nurses aid.     Hand Review of Systems:  Swelling?  no  Instability?  no  Clicking?  no  Limited ROM? no  Fever/Chills? no  Subluxation? no  Dislocation? no  Numbness/Tingling? no  Weakness? no    Current Choice of Exercise:  none       Objective:      Physical Exam:    /77   Pulse 77   Resp 16   Ht 5' 4" (1.626 m)   Wt 70.3 kg (155 lb)   BMI 26.61 kg/m²     Appearance:  Soft tissue swelling: Left: no Right: no  Effusion: Left:  Negative Right: Negative  Erythema: Left no Right: no  Ecchymosis: Left: no Right: no  Atrophy: Left: no Right: no; Skin discoloration noted on the the radial wrist    Palpation:  Hand/wrist Tenderness: Left: none  Right: radial wrist    Range of motion:  Flexion (0-80): Left:  80 Right: 80  Extension (0-70): Left:  70 Right: 70  Ulnar deviation (0-30): 30 Right: 30  Radial deviation (0-20): 20 Right: 20  Supination (0-90): Left: 90 Right: 90  Pronation (0-90): Left: 90 Right: 90  Able to make a power fist and claw hand: on Both hand(s)  Distal palmar crease-finger tip distance: 0 " on Both hand(s)    Strength:  Flexion: Left: 5/5 Pain: no Right: 5/5 Pain: no  Extension: Left: 5/5 Pain: no Right: 5/5 Pain: no  Supination: Left: 5/5 Pain: no Right: 5/5 Pain: no  Pronation: Left: 5/5 Pain: no Right: 5/5 Pain: no  Ulnar deviation: Left: 5/5 Pain: no Right: 5/5 Pain: no  Radial deviation: Left: 5/5 Pain: no Right: 5/5 Pain: no    Special Tests:  Durkans Test (Carpal Compression test): Left: Negative  Right: Negative  Tinels:  Left: Negative  Right: Negative    Phalens: Left: Negative  Right: Negative    Oberlin Litler test:  Left: Negative  Right: Negative    UCL laxity: Left: Not performed  Right: Not performed  Finkelstein's Test: Left: Negative Right: Positive      AIN/PIN/Radial nerve: Intact and symmetric    General appearance: NAD  Peripheral pulses: normal bilaterally   Reflexes: Left: Not performed Right: Not performed   Sensation: normal    Labs:  Last A1c: 5.8     Imaging:   Previous images reviewed.  X-rays ordered and performed today: no  # of views: 3 Laterality: right  My Interpretation:  Distal Radial ulnar joint space is overall Normal on AP views. Scapholunate interval distance is Normal on right AP views. A DISI/VISI is not suggested on right hand series. Negative/positive ulnar variance is not suggested on lright lateral views.  no fracture, dislocation, swelling or degenerative changes noted      Assessment:        Encounter Diagnoses   Name Primary?    Tenosynovitis, de Quervain Yes    De Quervain's disease (tenosynovitis)         Plan:          Dx: right. De Quervain's Tenosynovitis Acute in moderate exacerbation. Patient with a history of BL De Quervain's Tenosynovitis that responded well to a CSI but has now returned again due to repetitive wrist use at home  /work. Discussed risks/benefits of doing a repeat CSI vs. trying a more rigorous adherence to thumb spica splint throughout the whole day & nsaids  for at least 2 weeks. Will try splinting and oral NSAIDS and f/u in 2  weeks if no improvement.    Treatment Plan: Discussed with patient diagnosis and treatment recommendations.   Natural history and expected course discussed. Questions answered.  Reduction in offending activity.  Gentle ROM exercises.  Plain film x-rays.  Home physical therapy exercise handouts provided to patient.   Over the counter NSAID and/or tylenol provided you do not have contraindications such as but not limited to liver or kidney disease or uncontrolled blood pressure. If you're doctors have told you to to not take them based on your health, do not take them.   Using a brace provided to you here or from your local pharmacy or durable medical equipment (DME) store.   Imaging: radiological studies ordered and independently reviewed; discussed with patient; pending radiologist interpretation.   Procedure: Discussed CSI/VSI as treatment options; discussed CSI vs VS injections as treatment options in future if conservative measures do not improve symptoms.  Activity: Activity as tolerated  Therapy: No formal therapy  Medication: first line treatment with daily acetaminophen. Up to 1000 mg three times daily can be taken; medication precautions given.. Please see your primary care physician for further refills.  RTC: 2 weeks.

## 2022-09-28 NOTE — TELEPHONE ENCOUNTER
Please notify patient to let her know that lab results reviewed and are within acceptable levels.     Thank you

## 2022-09-29 LAB
HAV IGM SERPL QL IA: NONREACTIVE
HBV CORE IGM SERPL QL IA: NONREACTIVE
HBV SURFACE AG SERPL QL IA: NONREACTIVE
HCV AB SERPL QL IA: NONREACTIVE

## 2022-09-30 LAB — PATH REV: NORMAL

## 2022-10-18 ENCOUNTER — OFFICE VISIT (OUTPATIENT)
Dept: INTERNAL MEDICINE | Facility: CLINIC | Age: 37
End: 2022-10-18
Payer: MEDICAID

## 2022-10-18 VITALS
SYSTOLIC BLOOD PRESSURE: 127 MMHG | HEIGHT: 64 IN | HEART RATE: 78 BPM | WEIGHT: 155.38 LBS | TEMPERATURE: 97 F | BODY MASS INDEX: 26.53 KG/M2 | DIASTOLIC BLOOD PRESSURE: 84 MMHG

## 2022-10-18 DIAGNOSIS — F41.9 ANXIETY: Primary | ICD-10-CM

## 2022-10-18 PROCEDURE — 99214 OFFICE O/P EST MOD 30 MIN: CPT | Mod: SA,HB,S$PBB, | Performed by: NURSE PRACTITIONER

## 2022-10-18 PROCEDURE — 1159F PR MEDICATION LIST DOCUMENTED IN MEDICAL RECORD: ICD-10-PCS | Mod: CPTII,,, | Performed by: NURSE PRACTITIONER

## 2022-10-18 PROCEDURE — 99214 PR OFFICE/OUTPT VISIT, EST, LEVL IV, 30-39 MIN: ICD-10-PCS | Mod: SA,HB,S$PBB, | Performed by: NURSE PRACTITIONER

## 2022-10-18 PROCEDURE — 1160F RVW MEDS BY RX/DR IN RCRD: CPT | Mod: CPTII,,, | Performed by: NURSE PRACTITIONER

## 2022-10-18 PROCEDURE — 3079F DIAST BP 80-89 MM HG: CPT | Mod: CPTII,,, | Performed by: NURSE PRACTITIONER

## 2022-10-18 PROCEDURE — 1160F PR REVIEW ALL MEDS BY PRESCRIBER/CLIN PHARMACIST DOCUMENTED: ICD-10-PCS | Mod: CPTII,,, | Performed by: NURSE PRACTITIONER

## 2022-10-18 PROCEDURE — 3074F SYST BP LT 130 MM HG: CPT | Mod: CPTII,,, | Performed by: NURSE PRACTITIONER

## 2022-10-18 PROCEDURE — 99213 OFFICE O/P EST LOW 20 MIN: CPT | Mod: PBBFAC | Performed by: NURSE PRACTITIONER

## 2022-10-18 PROCEDURE — 3079F PR MOST RECENT DIASTOLIC BLOOD PRESSURE 80-89 MM HG: ICD-10-PCS | Mod: CPTII,,, | Performed by: NURSE PRACTITIONER

## 2022-10-18 PROCEDURE — 3074F PR MOST RECENT SYSTOLIC BLOOD PRESSURE < 130 MM HG: ICD-10-PCS | Mod: CPTII,,, | Performed by: NURSE PRACTITIONER

## 2022-10-18 PROCEDURE — 1159F MED LIST DOCD IN RCRD: CPT | Mod: CPTII,,, | Performed by: NURSE PRACTITIONER

## 2022-10-18 RX ORDER — CITALOPRAM 20 MG/1
20 TABLET, FILM COATED ORAL DAILY
Qty: 30 TABLET | Refills: 3 | OUTPATIENT
Start: 2022-10-18 | End: 2024-02-19
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
## 2023-03-28 ENCOUNTER — LAB VISIT (OUTPATIENT)
Dept: LAB | Facility: HOSPITAL | Age: 38
End: 2023-03-28
Attending: OTOLARYNGOLOGY
Payer: MEDICAID

## 2023-03-28 ENCOUNTER — OFFICE VISIT (OUTPATIENT)
Dept: OTOLARYNGOLOGY | Facility: CLINIC | Age: 38
End: 2023-03-28
Payer: MEDICAID

## 2023-03-28 VITALS
TEMPERATURE: 98 F | DIASTOLIC BLOOD PRESSURE: 76 MMHG | SYSTOLIC BLOOD PRESSURE: 112 MMHG | HEART RATE: 73 BPM | BODY MASS INDEX: 25.23 KG/M2 | WEIGHT: 147 LBS

## 2023-03-28 DIAGNOSIS — E04.2 MULTINODULAR GOITER (NONTOXIC): Primary | ICD-10-CM

## 2023-03-28 DIAGNOSIS — E04.2 MULTINODULAR GOITER (NONTOXIC): ICD-10-CM

## 2023-03-28 LAB — TSH SERPL-ACNC: 0.6 UIU/ML (ref 0.35–4.94)

## 2023-03-28 PROCEDURE — 36415 COLL VENOUS BLD VENIPUNCTURE: CPT

## 2023-03-28 PROCEDURE — 99213 OFFICE O/P EST LOW 20 MIN: CPT | Mod: PBBFAC | Performed by: OTOLARYNGOLOGY

## 2023-03-28 PROCEDURE — 84443 ASSAY THYROID STIM HORMONE: CPT

## 2023-03-28 NOTE — PROGRESS NOTES
Patient Name: Ruby Angel   YOB: 1985     Chief Complaint:  Follow-up for multinodular goiter       History of Present Illness:  2023:   38-year-old female presents today for follow-up of her multinodular goiter.  She was not able to get her thyroid ultrasound and TSH level done prior to the clinic visit due to work obligations.  The patient has not noticed any apparent change in her neck.  She has not noticed swelling in the neck, difficulty swallowing, hoarseness, or difficulty breathing.  She has no complaints of unusual fatigue, palpitations, or weight loss.    She did have needle biopsy done of a right thyroid nodule and a left thyroid nodule on 2022, with both of these showing benign pathology.  These biopsies were done after follow-up thyroid ultrasound did show an increase in size in these nodules.  Additional ultrasound-guided needle biopsy of the thyroid was done on 2021, which included biopsies of right upper and right lower nodule with both of these also being benign.  Prior thyroid function studies showed her to be euthyroid.  She has no other new problems today.    Past Medical History:  Past Medical History:   Diagnosis Date    Anxiety     Depression      Past Surgical History:   Procedure Laterality Date     SECTION       SECTION      COLONOSCOPY W/ BIOPSIES AND POLYPECTOMY  2018    DILATION AND CURETTAGE OF UTERUS  2018       Review of Systems:  Unremarkable except as mentioned above.    Current Medications:  Current Outpatient Medications   Medication Sig    cholecalciferol, vitamin D3, (VITAMIN D3) 50 mcg (2,000 unit) Tab Take 2,000 Units by mouth.    citalopram (CELEXA) 20 MG tablet Take 1 tablet (20 mg total) by mouth once daily. (Patient not taking: Reported on 3/28/2023)    diclofenac sodium (VOLTAREN) 1 % Gel Apply 2 g topically 3 (three) times daily as needed (pain).    metFORMIN (GLUCOPHAGE) 500 MG tablet Take 1 tablet  (500 mg total) by mouth daily with breakfast. (Patient not taking: Reported on 3/28/2023)     No current facility-administered medications for this visit.        Allergies:  Review of patient's allergies indicates:   Allergen Reactions    Benadryl allergy-sinus Shortness Of Breath    Diphenhydramine hcl Swelling    Diphenhyd-pe-acetaminophen         Physical Exam:  Vital signs:   Vitals:    03/28/23 0941   BP: 112/76   Pulse: 73   Temp: 98.3 °F (36.8 °C)   Weight: 66.7 kg (147 lb)   General:  Well-developed well-nourished female in no acute distress.  Voice is normal.  Head and face:  Normocephalic.  No facial lesions.  No temporomandibular joint tenderness or click.  Ears:  Right ear-auricle is normally developed.  External auditory canal is clear.  Tympanic membrane is nonerythematous.  No middle ear effusion.  Left ear-auricle is normally developed.  External auditory canal is clear.  Tympanic membrane is nonerythematous.  No middle ear effusion.  Nose:  Nasal dorsum is unremarkable.  She has right septal deviation.  No significant intranasal congestion.  Secretions are clear.  Oral cavity and oropharynx:  Tongue and floor mouth are without lesions.  Mucosa is moist.  No pharyngeal erythema or exudates.  No oropharyngeal masses.  No tonsillar hypertrophy.  Neck: Supple without palpable adenopathy.  She has some diffuse fullness involving both sides of the thyroid gland with the right side being a little bit larger than the left.  Trachea in the midline.  Parotid and submandibular glands are normal to palpation.  Eyes:  Extraocular muscles intact.  No nystagmus.  No exophthalmos or enophthalmos.  Neurologic:  Alert and oriented.  Cranial nerves 2-12 are grossly normal.      Assessment/Plan:  Multinodular goiter-clinically stable.      Plan:  Schedule repeat TSH level and thyroid ultrasound.    Telemedicine follow-up in 3 weeks to review those results.    Follow-up in clinic in 1 year with preclinic TSH level and  thyroid ultrasound.      Leodan Robertson M.D.

## 2023-04-04 ENCOUNTER — DOCUMENTATION ONLY (OUTPATIENT)
Dept: ADMINISTRATIVE | Facility: HOSPITAL | Age: 38
End: 2023-04-04
Payer: MEDICAID

## 2023-04-10 ENCOUNTER — HOSPITAL ENCOUNTER (OUTPATIENT)
Dept: RADIOLOGY | Facility: HOSPITAL | Age: 38
Discharge: HOME | End: 2023-04-10
Attending: OTOLARYNGOLOGY
Payer: MEDICAID

## 2023-04-10 DIAGNOSIS — E04.2 MULTINODULAR GOITER (NONTOXIC): ICD-10-CM

## 2023-04-10 PROCEDURE — 76536 US EXAM OF HEAD AND NECK: CPT | Mod: TC

## 2023-04-11 ENCOUNTER — PATIENT MESSAGE (OUTPATIENT)
Dept: RESEARCH | Facility: HOSPITAL | Age: 38
End: 2023-04-11
Payer: MEDICAID

## 2023-05-02 ENCOUNTER — OFFICE VISIT (OUTPATIENT)
Dept: OTOLARYNGOLOGY | Facility: CLINIC | Age: 38
End: 2023-05-02
Payer: MEDICAID

## 2023-05-02 DIAGNOSIS — E04.2 MULTINODULAR GOITER (NONTOXIC): Primary | ICD-10-CM

## 2023-05-02 NOTE — PROGRESS NOTES
Audio Only Telehealth Visit     The patient location is: [facility]  The chief complaint leading to consultation is:  Follow-up for test results  Visit type: Virtual visit with audio only (telephone)  Total time spent on visit:  10 minutes      The reason for the audio only service rather than synchronous audio and video virtual visit was related to technical difficulties or patient preference/necessity.     Each patient to whom I provide medical services by telemedicine is:  (1) informed of the relationship between the physician and patient and the respective role of any other health care provider with respect to management of the patient; and (2) notified that they may decline to receive medical services by telemedicine and may withdraw from such care at any time. Patient verbally consented to receive this service via voice-only telephone call.       HPI:   March 28, 2023:   38-year-old female presents today for follow-up of her multinodular goiter.  She was not able to get her thyroid ultrasound and TSH level done prior to the clinic visit due to work obligations.  The patient has not noticed any apparent change in her neck.  She has not noticed swelling in the neck, difficulty swallowing, hoarseness, or difficulty breathing.  She has no complaints of unusual fatigue, palpitations, or weight loss.    She did have needle biopsy done of a right thyroid nodule and a left thyroid nodule on March 23, 2022, with both of these showing benign pathology.  These biopsies were done after follow-up thyroid ultrasound did show an increase in size in these nodules.  Additional ultrasound-guided needle biopsy of the thyroid was done on February 9, 2021, which included biopsies of right upper and right lower nodule with both of these also being benign.  Prior thyroid function studies showed her to be euthyroid.  She has no other new problems today.    May 2, 2023:   Telemedicine appointment was carried out with the patient  today to review her TSH level and thyroid ultrasound results.  TSH level showed her to be euthyroid.  Thyroid ultrasound from April 10th 2023, showed multinodular goiter was stable to the prior examination.  The largest nodule on the right measured 3 x 2.5 x 2.5 cm and on the left the largest measured 2.3 x 1.4 x 1.7 cm.  Results were discussed with the patient.  She remains asymptomatic.  She is no other new problems today.     Assessment and plan:   Multinodular goiter-clinically stable.     Plan:  Keep previously scheduled follow-up appointment for March 28, 2024.  Will obtain TSH level and thyroid ultrasound preclinic.            This service was not originating from a related E/M service provided within the previous 7 days nor will  to an E/M service or procedure within the next 24 hours or my soonest available appointment.  Prevailing standard of care was able to be met in this audio-only visit.

## 2023-05-28 ENCOUNTER — HOSPITAL ENCOUNTER (EMERGENCY)
Facility: HOSPITAL | Age: 38
Discharge: HOME | End: 2023-05-28
Attending: EMERGENCY MEDICINE
Payer: MEDICAID

## 2023-05-28 VITALS
OXYGEN SATURATION: 100 % | HEIGHT: 64 IN | DIASTOLIC BLOOD PRESSURE: 84 MMHG | BODY MASS INDEX: 25.67 KG/M2 | TEMPERATURE: 97 F | RESPIRATION RATE: 20 BRPM | WEIGHT: 150.38 LBS | HEART RATE: 74 BPM | SYSTOLIC BLOOD PRESSURE: 124 MMHG

## 2023-05-28 DIAGNOSIS — R10.84 GENERALIZED ABDOMINAL PAIN: Primary | ICD-10-CM

## 2023-05-28 LAB
ALBUMIN SERPL-MCNC: 4 G/DL (ref 3.5–5)
ALBUMIN/GLOB SERPL: 1 RATIO (ref 1.1–2)
ALP SERPL-CCNC: 49 UNIT/L (ref 40–150)
ALT SERPL-CCNC: 19 UNIT/L (ref 0–55)
APPEARANCE UR: ABNORMAL
AST SERPL-CCNC: 26 UNIT/L (ref 5–34)
B-HCG UR QL: NEGATIVE
BACTERIA #/AREA URNS AUTO: ABNORMAL /HPF
BASOPHILS # BLD AUTO: 0.03 X10(3)/MCL
BASOPHILS NFR BLD AUTO: 0.6 %
BILIRUB UR QL STRIP.AUTO: NEGATIVE MG/DL
BILIRUBIN DIRECT+TOT PNL SERPL-MCNC: 0.9 MG/DL
BUN SERPL-MCNC: 10.5 MG/DL (ref 7–18.7)
CALCIUM SERPL-MCNC: 9.2 MG/DL (ref 8.4–10.2)
CHLORIDE SERPL-SCNC: 108 MMOL/L (ref 98–107)
CO2 SERPL-SCNC: 22 MMOL/L (ref 22–29)
COLOR UR: ABNORMAL
CREAT SERPL-MCNC: 0.69 MG/DL (ref 0.55–1.02)
CTP QC/QA: YES
EOSINOPHIL # BLD AUTO: 0.09 X10(3)/MCL (ref 0–0.9)
EOSINOPHIL NFR BLD AUTO: 1.7 %
ERYTHROCYTE [DISTWIDTH] IN BLOOD BY AUTOMATED COUNT: 13.6 % (ref 11.5–17)
GFR SERPLBLD CREATININE-BSD FMLA CKD-EPI: >60 MLS/MIN/1.73/M2
GLOBULIN SER-MCNC: 4 GM/DL (ref 2.4–3.5)
GLUCOSE SERPL-MCNC: 81 MG/DL (ref 74–100)
GLUCOSE UR QL STRIP.AUTO: NORMAL MG/DL
HCT VFR BLD AUTO: 41.4 % (ref 37–47)
HGB BLD-MCNC: 13.4 G/DL (ref 12–16)
HYALINE CASTS #/AREA URNS LPF: ABNORMAL /LPF
IMM GRANULOCYTES # BLD AUTO: 0.01 X10(3)/MCL (ref 0–0.04)
IMM GRANULOCYTES NFR BLD AUTO: 0.2 %
KETONES UR QL STRIP.AUTO: ABNORMAL MG/DL
LEUKOCYTE ESTERASE UR QL STRIP.AUTO: 75 UNIT/L
LIPASE SERPL-CCNC: 11 U/L
LYMPHOCYTES # BLD AUTO: 1.9 X10(3)/MCL (ref 0.6–4.6)
LYMPHOCYTES NFR BLD AUTO: 36.8 %
MCH RBC QN AUTO: 27 PG (ref 27–31)
MCHC RBC AUTO-ENTMCNC: 32.4 G/DL (ref 33–36)
MCV RBC AUTO: 83.5 FL (ref 80–94)
MONOCYTES # BLD AUTO: 0.41 X10(3)/MCL (ref 0.1–1.3)
MONOCYTES NFR BLD AUTO: 7.9 %
NEUTROPHILS # BLD AUTO: 2.72 X10(3)/MCL (ref 2.1–9.2)
NEUTROPHILS NFR BLD AUTO: 52.8 %
NITRITE UR QL STRIP.AUTO: NEGATIVE
NRBC BLD AUTO-RTO: 0 %
PH UR STRIP.AUTO: 5.5 [PH]
PLATELET # BLD AUTO: 294 X10(3)/MCL (ref 130–400)
PMV BLD AUTO: 9.8 FL (ref 7.4–10.4)
POTASSIUM SERPL-SCNC: 4 MMOL/L (ref 3.5–5.1)
PROT SERPL-MCNC: 8 GM/DL (ref 6.4–8.3)
PROT UR QL STRIP.AUTO: ABNORMAL MG/DL
RBC # BLD AUTO: 4.96 X10(6)/MCL (ref 4.2–5.4)
RBC #/AREA URNS AUTO: >100 /HPF
RBC UR QL AUTO: ABNORMAL UNIT/L
SODIUM SERPL-SCNC: 140 MMOL/L (ref 136–145)
SP GR UR STRIP.AUTO: 1.02
SQUAMOUS #/AREA URNS LPF: ABNORMAL /HPF
UROBILINOGEN UR STRIP-ACNC: NORMAL MG/DL
WBC # SPEC AUTO: 5.16 X10(3)/MCL (ref 4.5–11.5)
WBC #/AREA URNS AUTO: ABNORMAL /HPF

## 2023-05-28 PROCEDURE — 80053 COMPREHEN METABOLIC PANEL: CPT | Performed by: PHYSICIAN ASSISTANT

## 2023-05-28 PROCEDURE — 81001 URINALYSIS AUTO W/SCOPE: CPT | Performed by: PHYSICIAN ASSISTANT

## 2023-05-28 PROCEDURE — 81025 URINE PREGNANCY TEST: CPT | Performed by: PHYSICIAN ASSISTANT

## 2023-05-28 PROCEDURE — 85025 COMPLETE CBC W/AUTO DIFF WBC: CPT | Performed by: PHYSICIAN ASSISTANT

## 2023-05-28 PROCEDURE — 87088 URINE BACTERIA CULTURE: CPT | Performed by: PHYSICIAN ASSISTANT

## 2023-05-28 PROCEDURE — 83690 ASSAY OF LIPASE: CPT | Performed by: PHYSICIAN ASSISTANT

## 2023-05-28 PROCEDURE — 99284 EMERGENCY DEPT VISIT MOD MDM: CPT

## 2023-05-28 RX ORDER — DICYCLOMINE HYDROCHLORIDE 20 MG/1
20 TABLET ORAL 2 TIMES DAILY
Qty: 20 TABLET | Refills: 0 | Status: SHIPPED | OUTPATIENT
Start: 2023-05-28 | End: 2023-06-27

## 2023-05-28 NOTE — ED PROVIDER NOTES
Encounter Date: 2023       History     Chief Complaint   Patient presents with    Abdominal Pain     C/o left abdominal pain x 2 days. Denies any n/v/d     Ruby Angel is a 38 y.o. female who presents to the ED with complaints of left sided abdominal pain that started 8 months ago off and on but has been constant for the past 2 day(s) ago. She denies nausea, vomiting, and diarrhea. She denies dysuria and hematuria. She denies fever, chills, exposure to sick contacts.  She states the pain feels like a spasm. Sees Dr. Crow (GI) because she had a gastric ulcer and polyps removed in the past. Last saw him 2 years ago. Has not called him regarding these symptoms.       The history is provided by the patient. No  was used.   Review of patient's allergies indicates:   Allergen Reactions    Benadryl allergy-sinus Shortness Of Breath    Diphenhydramine hcl Swelling    Diphenhyd-pe-acetaminophen      Past Medical History:   Diagnosis Date    Anxiety     Depression      Past Surgical History:   Procedure Laterality Date     SECTION       SECTION      COLONOSCOPY W/ BIOPSIES AND POLYPECTOMY  2018    DILATION AND CURETTAGE OF UTERUS  2018     Family History   Problem Relation Age of Onset    Hypertension Mother     Hypertension Father     Diabetes Father     Kidney disease Father     Autoimmune disease Sister     Kidney failure Brother      Social History     Tobacco Use    Smoking status: Every Day     Packs/day: 0.50     Types: Cigarettes     Last attempt to quit: 2019     Years since quitting: 3.8    Smokeless tobacco: Never   Substance Use Topics    Alcohol use: Yes     Comment: occ    Drug use: Never     Review of Systems   Constitutional:  Negative for chills, fatigue and fever.   HENT:  Negative for congestion, ear pain, sinus pain and sore throat.    Eyes:  Negative for pain.   Respiratory:  Negative for cough, chest tightness and shortness of breath.     Cardiovascular:  Negative for chest pain.   Gastrointestinal:  Positive for abdominal pain. Negative for constipation, diarrhea, nausea and vomiting.   Genitourinary:  Negative for dysuria.   Musculoskeletal:  Negative for back pain and joint swelling.   Skin:  Negative for color change and rash.   Neurological:  Negative for dizziness and weakness.   Psychiatric/Behavioral:  Negative for behavioral problems and confusion.      Physical Exam     Initial Vitals [05/28/23 0841]   BP Pulse Resp Temp SpO2   124/84 74 20 97.3 °F (36.3 °C) 100 %      MAP       --         Physical Exam    Constitutional: She appears well-developed and well-nourished.   HENT:   Head: Normocephalic and atraumatic.   Nose: Nose normal.   Eyes: EOM are normal. Pupils are equal, round, and reactive to light.   Neck: Neck supple. No thyromegaly present. No JVD present.   Normal range of motion.  Cardiovascular:  Normal rate, regular rhythm, normal heart sounds and intact distal pulses.           No murmur heard.  Pulmonary/Chest: Breath sounds normal. No respiratory distress. She has no wheezes. She has no rhonchi. She has no rales. She exhibits no tenderness.   Abdominal: Abdomen is soft. Bowel sounds are normal. She exhibits no distension. There is no abdominal tenderness. There is no rebound and no guarding.   Musculoskeletal:         General: No tenderness or edema. Normal range of motion.      Cervical back: Normal range of motion and neck supple.     Lymphadenopathy:     She has no cervical adenopathy.   Neurological: She is alert and oriented to person, place, and time.   Skin: Skin is warm and dry. Capillary refill takes less than 2 seconds.   Psychiatric: She has a normal mood and affect. Thought content normal.       ED Course   Procedures  Labs Reviewed   COMPREHENSIVE METABOLIC PANEL - Abnormal; Notable for the following components:       Result Value    Chloride 108 (*)     Globulin 4.0 (*)     Albumin/Globulin Ratio 1.0 (*)      All other components within normal limits   URINALYSIS, REFLEX TO URINE CULTURE - Abnormal; Notable for the following components:    Color, UA Red-brown (*)     Appearance, UA Cloudy (*)     Protein, UA 1+ (*)     Ketones, UA Trace (*)     Blood, UA 3+ (*)     Leukocyte Esterase, UA 75 (*)     WBC, UA 21-50 (*)     Squamous Epithelial Cells, UA Few (*)     RBC, UA >100 (*)     All other components within normal limits   CBC WITH DIFFERENTIAL - Abnormal; Notable for the following components:    MCHC 32.4 (*)     All other components within normal limits   LIPASE - Normal   CULTURE, URINE   CBC W/ AUTO DIFFERENTIAL    Narrative:     The following orders were created for panel order CBC auto differential.  Procedure                               Abnormality         Status                     ---------                               -----------         ------                     CBC with Differential[[id number]]        Abnormal            Final result                 Please view results for these tests on the individual orders.   EXTRA TUBES    Narrative:     The following orders were created for panel order EXTRA TUBES.  Procedure                               Abnormality         Status                     ---------                               -----------         ------                     Light Blue Top Hold[[id number]]                              In process                 Gold Top Hold[[id number]]                                    In process                   Please view results for these tests on the individual orders.   LIGHT BLUE TOP HOLD   GOLD TOP HOLD   POCT URINE PREGNANCY          Imaging Results              X-Ray Abdomen Flat And Erect (Final result)  Result time 05/28/23 10:31:50      Final result by Rocky Mendiola MD (05/28/23 10:31:50)                   Impression:      No acute radiographic findings.      Electronically signed by: Rocky Mendiola  Date:    05/28/2023  Time:    10:31                Narrative:    EXAMINATION:  XR ABDOMEN FLAT AND ERECT    CLINICAL HISTORY:  abdominal pain;    COMPARISON:  19 November 2020    FINDINGS:  Flat and upright views of the abdomen demonstrate a nonspecific, nonobstructive bowel gas pattern.  Moderate stool in the colon.  No free air.                                       Medications - No data to display  Medical Decision Making:   Initial Assessment:   37 y/o female with complaints of L sided abdominal pain x 8 months that has been constant x 2 days. Denies n/v/d, dysuria, hematuria, fever, chils.   Differential Diagnosis:   Nonspecific abdominal pain, abdominal spasm, diverticulitis, gastric ulcer, gastritis, pancreatitis, colitis  Clinical Tests:   Lab Tests: Ordered  Radiological Study: Ordered  ED Management:  Will obtain labs (CBC, CMP, Lipase, and UA) with an abdominal xray. Patient follows with Dr. Crow. Patient was offered Bentyl for her abdominal spasms in the ED but she declined. Workup wnl. Urine consistent with patient being on menstrual cycle. Will DC home with Bentyl and instructed patient to follow up with Dr. Crow if symptoms persist. She verbalized understanding to the plan. Strict ED precautions given. Stable for discharge.                         Clinical Impression:   Final diagnoses:  [R10.84] Generalized abdominal pain (Primary)        ED Disposition Condition    Discharge Stable          ED Prescriptions       Medication Sig Dispense Start Date End Date Auth. Provider    dicyclomine (BENTYL) 20 mg tablet Take 1 tablet (20 mg total) by mouth 2 (two) times daily. 20 tablet 5/28/2023 6/27/2023 Bindu Adrian PA-C          Follow-up Information       Follow up With Specialties Details Why Contact Info    Ania Espinal NP Nurse Practitioner   [address] [facility] [id number] [phone]      [facility] Emergency Medicine In 3 days As needed, If symptoms worsen [address] [place] [address]  [place] [address]  [phone]             Bindu Adrian PA-C  05/28/23 1042

## 2023-05-30 ENCOUNTER — TELEPHONE (OUTPATIENT)
Dept: FAMILY MEDICINE | Facility: CLINIC | Age: 38
End: 2023-05-30
Payer: MEDICAID

## 2023-05-30 LAB — BACTERIA UR CULT: ABNORMAL

## 2023-06-22 ENCOUNTER — OFFICE VISIT (OUTPATIENT)
Dept: INTERNAL MEDICINE | Facility: CLINIC | Age: 38
End: 2023-06-22
Payer: MEDICAID

## 2023-06-22 VITALS
TEMPERATURE: 98 F | DIASTOLIC BLOOD PRESSURE: 75 MMHG | HEART RATE: 79 BPM | HEIGHT: 64 IN | BODY MASS INDEX: 25.75 KG/M2 | RESPIRATION RATE: 20 BRPM | SYSTOLIC BLOOD PRESSURE: 116 MMHG | WEIGHT: 150.81 LBS

## 2023-06-22 DIAGNOSIS — R10.10 PAIN OF UPPER ABDOMEN: ICD-10-CM

## 2023-06-22 DIAGNOSIS — K59.00 CONSTIPATION, UNSPECIFIED CONSTIPATION TYPE: ICD-10-CM

## 2023-06-22 PROCEDURE — 3078F PR MOST RECENT DIASTOLIC BLOOD PRESSURE < 80 MM HG: ICD-10-PCS | Mod: CPTII,,, | Performed by: NURSE PRACTITIONER

## 2023-06-22 PROCEDURE — 1160F PR REVIEW ALL MEDS BY PRESCRIBER/CLIN PHARMACIST DOCUMENTED: ICD-10-PCS | Mod: CPTII,,, | Performed by: NURSE PRACTITIONER

## 2023-06-22 PROCEDURE — 3008F BODY MASS INDEX DOCD: CPT | Mod: CPTII,,, | Performed by: NURSE PRACTITIONER

## 2023-06-22 PROCEDURE — 1159F MED LIST DOCD IN RCRD: CPT | Mod: CPTII,,, | Performed by: NURSE PRACTITIONER

## 2023-06-22 PROCEDURE — 3008F PR BODY MASS INDEX (BMI) DOCUMENTED: ICD-10-PCS | Mod: CPTII,,, | Performed by: NURSE PRACTITIONER

## 2023-06-22 PROCEDURE — 1159F PR MEDICATION LIST DOCUMENTED IN MEDICAL RECORD: ICD-10-PCS | Mod: CPTII,,, | Performed by: NURSE PRACTITIONER

## 2023-06-22 PROCEDURE — 99215 OFFICE O/P EST HI 40 MIN: CPT | Mod: PBBFAC | Performed by: NURSE PRACTITIONER

## 2023-06-22 PROCEDURE — 1160F RVW MEDS BY RX/DR IN RCRD: CPT | Mod: CPTII,,, | Performed by: NURSE PRACTITIONER

## 2023-06-22 PROCEDURE — 99214 PR OFFICE/OUTPT VISIT, EST, LEVL IV, 30-39 MIN: ICD-10-PCS | Mod: S$PBB,,, | Performed by: NURSE PRACTITIONER

## 2023-06-22 PROCEDURE — 3074F PR MOST RECENT SYSTOLIC BLOOD PRESSURE < 130 MM HG: ICD-10-PCS | Mod: CPTII,,, | Performed by: NURSE PRACTITIONER

## 2023-06-22 PROCEDURE — 3078F DIAST BP <80 MM HG: CPT | Mod: CPTII,,, | Performed by: NURSE PRACTITIONER

## 2023-06-22 PROCEDURE — 99214 OFFICE O/P EST MOD 30 MIN: CPT | Mod: S$PBB,,, | Performed by: NURSE PRACTITIONER

## 2023-06-22 PROCEDURE — 3074F SYST BP LT 130 MM HG: CPT | Mod: CPTII,,, | Performed by: NURSE PRACTITIONER

## 2023-06-22 RX ORDER — CLONAZEPAM 0.5 MG/1
0.5 TABLET ORAL 2 TIMES DAILY
COMMUNITY
Start: 2023-06-07 | End: 2024-02-19

## 2023-06-22 RX ORDER — MIRTAZAPINE 15 MG/1
15 TABLET, FILM COATED ORAL NIGHTLY
COMMUNITY
Start: 2023-05-05

## 2023-06-22 RX ORDER — DEXTROAMPHETAMINE SACCHARATE, AMPHETAMINE ASPARTATE, DEXTROAMPHETAMINE SULFATE AND AMPHETAMINE SULFATE 2.5; 2.5; 2.5; 2.5 MG/1; MG/1; MG/1; MG/1
1 TABLET ORAL 2 TIMES DAILY
COMMUNITY
Start: 2023-06-07

## 2023-06-22 NOTE — PROGRESS NOTES
"  Ania Espinal NP   [facility]  [address] [facility] [address]      PATIENT NAME: Ruby Angel  : 1985  DATE: 23  MRN: [id number]        Reason for Visit / Chief Complaint: ER follow up (Abdominal pain )       History of Present Illness / Problem Focused Workflow     Ruby Angel presents to the clinic with ER follow up (Abdominal pain )     39 yo AAF for ER follow up. Presented to ER on 23 for abdominal pain. She reports today pain has been ongoing x 8 months but feels it is worsening. Occurring more often. At onset occurred 3 x monthly but now happening more often. Endorsing b/l upper quadrant abdominal pain. Describes pain as cramping. Has BM every other day. Does endorse hard stools. Takes Miralax PRN/ "rarely." Denies any n/v or unintentional wt loss, acid reflux, indigestion, heartburn. She has had prior colonoscopy with Dr. Crow. She has not contacted Dr. Crow re symptoms. Denies any CP, SOB.     Last visit in [facility] 2022. Pmh includes HTN, PCOS, IGT, depression, vitamin D deficiency, chronic constipation, thyroid nodules, and tobacco abuse.     Other providers   Dr. Tristin Espinal - Psychiatrist   [facility]       Review of Systems     Review of Systems   Constitutional: Negative.    HENT: Negative.     Eyes: Negative.    Respiratory: Negative.     Cardiovascular: Negative.    Gastrointestinal:  Positive for abdominal pain.   Endocrine: Negative.    Genitourinary: Negative.    Musculoskeletal: Negative.    Skin: Negative.    Allergic/Immunologic: Negative.    Neurological: Negative.    Hematological: Negative.    Psychiatric/Behavioral: Negative.       Medical / Social / Family History     ----------------------------  Anxiety  Depression     Past Surgical History:   Procedure Laterality Date     SECTION       SECTION      COLONOSCOPY W/ BIOPSIES AND POLYPECTOMY  2018    " DILATION AND CURETTAGE OF UTERUS  07/2018       Social History     Socioeconomic History    Marital status:    Tobacco Use    Smoking status: Former     Packs/day: 0.50     Years: 2.00     Pack years: 1.00     Types: Cigarettes     Start date: 11/10/2017     Quit date: 7/18/2019     Years since quitting: 3.9    Smokeless tobacco: Never   Substance and Sexual Activity    Alcohol use: Yes     Alcohol/week: 1.0 standard drink     Types: 1 Glasses of wine per week     Comment: Occasionally    Drug use: Never    Sexual activity: Not Currently     Partners: Male     Birth control/protection: Abstinence     Social Determinants of Health     Physical Activity: Inactive    Days of Exercise per Week: 0 days    Minutes of Exercise per Session: 0 min        Family History   Problem Relation Age of Onset    Hypertension Mother     Diabetes Mother     Heart disease Mother     Hypertension Father     Diabetes Father     Kidney disease Father     Autoimmune disease Sister     Kidney failure Brother     Kidney disease Brother         Medications and Allergies     Medications  Current Outpatient Medications   Medication Instructions    cholecalciferol (vitamin D3) (VITAMIN D3) 2,000 Units, Oral    citalopram (CELEXA) 20 mg, Oral, Daily    clonazePAM (KLONOPIN) 0.5 mg, Oral, 2 times daily    dextroamphetamine-amphetamine 10 mg Tab 1 tablet, Oral, 2 times daily    diclofenac sodium (VOLTAREN) 2 g, Topical (Top), 3 times daily PRN    dicyclomine (BENTYL) 20 mg, Oral, 2 times daily    metFORMIN (GLUCOPHAGE) 500 mg, Oral, With breakfast    mirtazapine (REMERON) 15 mg, Oral, Nightly       Allergies  Review of patient's allergies indicates:   Allergen Reactions    Benadryl allergy-sinus Shortness Of Breath    Diphenhydramine hcl Swelling    Diphenhyd-pe-acetaminophen        Physical Examination     Visit Vitals  /75 (BP Location: Left arm, Patient Position: Sitting, BP Method: Large (Automatic))   Pulse 79   Temp 98.3 °F (36.8  "°C) (Oral)   Resp 20   Ht 5' 4" (1.626 m)   Wt 68.4 kg (150 lb 12.8 oz)   LMP 06/08/2023   BMI 25.88 kg/m²       Physical Exam  Vitals and nursing note reviewed.   Constitutional:       Appearance: Normal appearance. She is not ill-appearing.   HENT:      Head: Normocephalic.   Cardiovascular:      Rate and Rhythm: Normal rate and regular rhythm.      Pulses: Normal pulses.   Pulmonary:      Effort: Pulmonary effort is normal. No respiratory distress.      Breath sounds: Normal breath sounds.   Abdominal:      General: Bowel sounds are normal. There is no distension.      Palpations: Abdomen is soft. There is no mass.      Tenderness: There is abdominal tenderness (generalized upper abdomen with deep palpation). There is no guarding or rebound.      Hernia: No hernia is present.   Musculoskeletal:         General: Normal range of motion.      Cervical back: Normal range of motion and neck supple.      Right lower leg: No edema.      Left lower leg: No edema.   Skin:     General: Skin is warm and dry.   Neurological:      Mental Status: She is alert and oriented to person, place, and time. Mental status is at baseline.      Motor: No weakness.   Psychiatric:         Mood and Affect: Mood normal.         Behavior: Behavior normal.         Thought Content: Thought content normal.         Judgment: Judgment normal.         Results     Lab Results   Component Value Date    WBC 5.16 05/28/2023    RBC 4.96 05/28/2023    HGB 13.4 05/28/2023    HCT 41.4 05/28/2023    MCV 83.5 05/28/2023    MCH 27.0 05/28/2023    MCHC 32.4 (L) 05/28/2023    RDW 13.6 05/28/2023     05/28/2023    MPV 9.8 05/28/2023     Sodium Level   Date Value Ref Range Status   05/28/2023 140 136 - 145 mmol/L Final     Potassium Level   Date Value Ref Range Status   05/28/2023 4.0 3.5 - 5.1 mmol/L Final     Carbon Dioxide   Date Value Ref Range Status   05/28/2023 22 22 - 29 mmol/L Final     Blood Urea Nitrogen   Date Value Ref Range Status "   05/28/2023 10.5 7.0 - 18.7 mg/dL Final     Creatinine   Date Value Ref Range Status   05/28/2023 0.69 0.55 - 1.02 mg/dL Final     Calcium Level Total   Date Value Ref Range Status   05/28/2023 9.2 8.4 - 10.2 mg/dL Final     Albumin Level   Date Value Ref Range Status   05/28/2023 4.0 3.5 - 5.0 g/dL Final     Bilirubin Total   Date Value Ref Range Status   05/28/2023 0.9 <=1.5 mg/dL Final     Alkaline Phosphatase   Date Value Ref Range Status   05/28/2023 49 40 - 150 unit/L Final     Aspartate Aminotransferase   Date Value Ref Range Status   05/28/2023 26 5 - 34 unit/L Final     Alanine Aminotransferase   Date Value Ref Range Status   05/28/2023 19 0 - 55 unit/L Final     Estimated GFR-Non    Date Value Ref Range Status   12/09/2021 100 >>=90 mL/min/1.73 m2 Final     Lab Results   Component Value Date    CHOL 189 09/28/2022     Lab Results   Component Value Date    HDL 53 09/28/2022     No results found for: LDLCALC  Lab Results   Component Value Date    TRIG 82 09/28/2022     No results found for: CHOLHDL  Lab Results   Component Value Date    TSH 0.605 03/28/2023     Lab Results   Component Value Date    PHUR 7 10/26/2021    SPECGRAV 1.020 10/26/2021    PROTEINUA 1+ (A) 05/28/2023    GLUCUA Negative 04/08/2021    KETONESU Trace 10/26/2021    OCCULTUA Negative 04/08/2021    NITRITE Negative 10/26/2021    LEUKOCYTESUR 75 (A) 05/28/2023     Lab Results   Component Value Date    HGBA1C 5.8 09/28/2022    HGBA1C 5.2 12/09/2021    HGBA1C 5.6 04/08/2021     No results found for: MICROALBUR, [id number]   No results found for this or any previous visit.       ER notes reviewed 5/28/23  Assessment       ICD-10-CM ICD-9-CM   1. Constipation, unspecified constipation type  K59.00 564.00   2. Pain of upper abdomen  R10.10 789.09       Plan       1. Constipation, unspecified constipation type  Educated on slowly increasing fiber in diet to include fresh fruits and vegetables (squash, cabbage, lettuce, other  greens, beans, and peas), whole grains and oats, nuts, and importance of adequate water intake (6-8 glasses of fluids daily).  Educated on health benefits of at least 5 days/ week of 30 minutes moderate intensity exercise (brisk walking) and 2 or more days/ week of muscle strength activities.  Daily Miralax with 6-8 oz water/juice- encouraged regular use     - Ambulatory referral/consult to Gastroenterology; Future    2. Pain of upper abdomen  Ongoing x 8  months with increase in frequency.   ER notes reviewed 5/28/23. XR with moderate amount of stool.   She has had prior colonoscopy with Dr. Crow. She has not contacted him re symptoms at this time.  ER precautions advised for any worsening/ sudden/ acute pain, n/v/d, bloody stools, poor po intake, fever, chills, weakness  Referral to Dr. Crow and instructed pt to contact his office for a follow up visit to discuss. Pt verb understanding.   - Ambulatory referral/consult to Gastroenterology; Future      Future Appointments   Date Time Provider Department Center   8/2/2023  9:00 AM Ania Espinal NP [facility] [place]   8/9/2023  9:50 AM AURE [place] [facility] [place]   3/28/2024  8:00 AM Leodan Robertson MD [facility] [place]        Follow up if symptoms worsen or fail to improve.    Signature:  Ania Espinal NP  [facility]  [address] [facility] [address]    Date of encounter: 6/22/23

## 2023-07-23 ENCOUNTER — HOSPITAL ENCOUNTER (EMERGENCY)
Facility: HOSPITAL | Age: 38
Discharge: HOME | End: 2023-07-23
Attending: STUDENT IN AN ORGANIZED HEALTH CARE EDUCATION/TRAINING PROGRAM
Payer: MEDICAID

## 2023-07-23 VITALS
HEIGHT: 64 IN | RESPIRATION RATE: 18 BRPM | DIASTOLIC BLOOD PRESSURE: 81 MMHG | OXYGEN SATURATION: 100 % | WEIGHT: 151.25 LBS | SYSTOLIC BLOOD PRESSURE: 148 MMHG | BODY MASS INDEX: 25.82 KG/M2 | TEMPERATURE: 98 F | HEART RATE: 60 BPM

## 2023-07-23 DIAGNOSIS — M79.604 PAIN IN BOTH LOWER EXTREMITIES: Primary | ICD-10-CM

## 2023-07-23 DIAGNOSIS — M79.605 PAIN IN BOTH LOWER EXTREMITIES: Primary | ICD-10-CM

## 2023-07-23 DIAGNOSIS — M79.606 LEG PAIN: ICD-10-CM

## 2023-07-23 LAB
ANION GAP SERPL CALC-SCNC: 9 MEQ/L
B-HCG UR QL: NEGATIVE
BASOPHILS # BLD AUTO: 0.02 X10(3)/MCL
BASOPHILS NFR BLD AUTO: 0.3 %
BUN SERPL-MCNC: 14.5 MG/DL (ref 7–18.7)
CALCIUM SERPL-MCNC: 9 MG/DL (ref 8.4–10.2)
CHLORIDE SERPL-SCNC: 106 MMOL/L (ref 98–107)
CO2 SERPL-SCNC: 24 MMOL/L (ref 22–29)
CREAT SERPL-MCNC: 0.68 MG/DL (ref 0.55–1.02)
CREAT/UREA NIT SERPL: 21
CTP QC/QA: YES
D DIMER PPP IA.FEU-MCNC: 0.67 UG/ML FEU (ref 0–0.5)
EOSINOPHIL # BLD AUTO: 0.06 X10(3)/MCL (ref 0–0.9)
EOSINOPHIL NFR BLD AUTO: 1 %
ERYTHROCYTE [DISTWIDTH] IN BLOOD BY AUTOMATED COUNT: 13.2 % (ref 11.5–17)
GFR SERPLBLD CREATININE-BSD FMLA CKD-EPI: >60 MLS/MIN/1.73/M2
GLUCOSE SERPL-MCNC: 104 MG/DL (ref 74–100)
HCT VFR BLD AUTO: 35.4 % (ref 37–47)
HGB BLD-MCNC: 11.3 G/DL (ref 12–16)
IMM GRANULOCYTES # BLD AUTO: 0 X10(3)/MCL (ref 0–0.04)
IMM GRANULOCYTES NFR BLD AUTO: 0 %
LYMPHOCYTES # BLD AUTO: 2.04 X10(3)/MCL (ref 0.6–4.6)
LYMPHOCYTES NFR BLD AUTO: 32.7 %
MCH RBC QN AUTO: 26.9 PG (ref 27–31)
MCHC RBC AUTO-ENTMCNC: 31.9 G/DL (ref 33–36)
MCV RBC AUTO: 84.3 FL (ref 80–94)
MONOCYTES # BLD AUTO: 0.52 X10(3)/MCL (ref 0.1–1.3)
MONOCYTES NFR BLD AUTO: 8.3 %
NEUTROPHILS # BLD AUTO: 3.59 X10(3)/MCL (ref 2.1–9.2)
NEUTROPHILS NFR BLD AUTO: 57.7 %
NRBC BLD AUTO-RTO: 0 %
PLATELET # BLD AUTO: 236 X10(3)/MCL (ref 130–400)
PMV BLD AUTO: 10.2 FL (ref 7.4–10.4)
POTASSIUM SERPL-SCNC: 3.6 MMOL/L (ref 3.5–5.1)
RBC # BLD AUTO: 4.2 X10(6)/MCL (ref 4.2–5.4)
SODIUM SERPL-SCNC: 139 MMOL/L (ref 136–145)
WBC # SPEC AUTO: 6.23 X10(3)/MCL (ref 4.5–11.5)

## 2023-07-23 PROCEDURE — 81025 URINE PREGNANCY TEST: CPT | Performed by: PHYSICIAN ASSISTANT

## 2023-07-23 PROCEDURE — 25000003 PHARM REV CODE 250: Performed by: PHYSICIAN ASSISTANT

## 2023-07-23 PROCEDURE — 99285 EMERGENCY DEPT VISIT HI MDM: CPT | Mod: 25

## 2023-07-23 PROCEDURE — 85379 FIBRIN DEGRADATION QUANT: CPT | Performed by: PHYSICIAN ASSISTANT

## 2023-07-23 PROCEDURE — 96372 THER/PROPH/DIAG INJ SC/IM: CPT | Performed by: PHYSICIAN ASSISTANT

## 2023-07-23 PROCEDURE — 85025 COMPLETE CBC W/AUTO DIFF WBC: CPT | Performed by: PHYSICIAN ASSISTANT

## 2023-07-23 PROCEDURE — 80048 BASIC METABOLIC PNL TOTAL CA: CPT | Performed by: PHYSICIAN ASSISTANT

## 2023-07-23 PROCEDURE — 63600175 PHARM REV CODE 636 W HCPCS: Performed by: PHYSICIAN ASSISTANT

## 2023-07-23 RX ORDER — KETOROLAC TROMETHAMINE 30 MG/ML
30 INJECTION, SOLUTION INTRAMUSCULAR; INTRAVENOUS
Status: COMPLETED | OUTPATIENT
Start: 2023-07-23 | End: 2023-07-23

## 2023-07-23 RX ORDER — TRAMADOL HYDROCHLORIDE 50 MG/1
50 TABLET ORAL
Status: COMPLETED | OUTPATIENT
Start: 2023-07-23 | End: 2023-07-23

## 2023-07-23 RX ORDER — METHOCARBAMOL 500 MG/1
500 TABLET, FILM COATED ORAL 3 TIMES DAILY
Qty: 15 TABLET | Refills: 0 | Status: SHIPPED | OUTPATIENT
Start: 2023-07-23 | End: 2023-07-28

## 2023-07-23 RX ADMIN — TRAMADOL HYDROCHLORIDE 50 MG: 50 TABLET, COATED ORAL at 05:07

## 2023-07-23 RX ADMIN — KETOROLAC TROMETHAMINE 30 MG: 30 INJECTION, SOLUTION INTRAMUSCULAR; INTRAVENOUS at 05:07

## 2023-07-26 ENCOUNTER — OFFICE VISIT (OUTPATIENT)
Dept: GYNECOLOGY | Facility: CLINIC | Age: 38
End: 2023-07-26
Payer: MEDICAID

## 2023-07-26 VITALS
HEART RATE: 84 BPM | WEIGHT: 152.31 LBS | OXYGEN SATURATION: 98 % | RESPIRATION RATE: 18 BRPM | HEIGHT: 64 IN | BODY MASS INDEX: 26 KG/M2 | DIASTOLIC BLOOD PRESSURE: 75 MMHG | SYSTOLIC BLOOD PRESSURE: 111 MMHG | TEMPERATURE: 98 F

## 2023-07-26 DIAGNOSIS — Z01.419 WOMEN'S ANNUAL ROUTINE GYNECOLOGICAL EXAMINATION: Primary | ICD-10-CM

## 2023-07-26 DIAGNOSIS — Z11.3 ROUTINE SCREENING FOR STI (SEXUALLY TRANSMITTED INFECTION): ICD-10-CM

## 2023-07-26 DIAGNOSIS — Z30.9 ENCOUNTER FOR CONTRACEPTIVE MANAGEMENT, UNSPECIFIED TYPE: ICD-10-CM

## 2023-07-26 LAB
B-HCG UR QL: NEGATIVE
C TRACH DNA SPEC QL NAA+PROBE: NOT DETECTED
CLUE CELLS VAG QL WET PREP: ABNORMAL
CTP QC/QA: YES
N GONORRHOEA DNA SPEC QL NAA+PROBE: NOT DETECTED
SOURCE (OHS): NORMAL
T VAGINALIS VAG QL WET PREP: ABNORMAL
WBC #/AREA VAG WET PREP: ABNORMAL
YEAST SPEC QL WET PREP: ABNORMAL

## 2023-07-26 PROCEDURE — 3074F SYST BP LT 130 MM HG: CPT | Mod: CPTII,,, | Performed by: NURSE PRACTITIONER

## 2023-07-26 PROCEDURE — 87210 SMEAR WET MOUNT SALINE/INK: CPT | Performed by: NURSE PRACTITIONER

## 2023-07-26 PROCEDURE — 99214 OFFICE O/P EST MOD 30 MIN: CPT | Mod: PBBFAC | Performed by: NURSE PRACTITIONER

## 2023-07-26 PROCEDURE — 3074F PR MOST RECENT SYSTOLIC BLOOD PRESSURE < 130 MM HG: ICD-10-PCS | Mod: CPTII,,, | Performed by: NURSE PRACTITIONER

## 2023-07-26 PROCEDURE — 1160F RVW MEDS BY RX/DR IN RCRD: CPT | Mod: CPTII,,, | Performed by: NURSE PRACTITIONER

## 2023-07-26 PROCEDURE — 3008F BODY MASS INDEX DOCD: CPT | Mod: CPTII,,, | Performed by: NURSE PRACTITIONER

## 2023-07-26 PROCEDURE — 3008F PR BODY MASS INDEX (BMI) DOCUMENTED: ICD-10-PCS | Mod: CPTII,,, | Performed by: NURSE PRACTITIONER

## 2023-07-26 PROCEDURE — 99395 PR PREVENTIVE VISIT,EST,18-39: ICD-10-PCS | Mod: S$PBB,,, | Performed by: NURSE PRACTITIONER

## 2023-07-26 PROCEDURE — 1159F MED LIST DOCD IN RCRD: CPT | Mod: CPTII,,, | Performed by: NURSE PRACTITIONER

## 2023-07-26 PROCEDURE — 99395 PREV VISIT EST AGE 18-39: CPT | Mod: S$PBB,,, | Performed by: NURSE PRACTITIONER

## 2023-07-26 PROCEDURE — 3078F DIAST BP <80 MM HG: CPT | Mod: CPTII,,, | Performed by: NURSE PRACTITIONER

## 2023-07-26 PROCEDURE — 1159F PR MEDICATION LIST DOCUMENTED IN MEDICAL RECORD: ICD-10-PCS | Mod: CPTII,,, | Performed by: NURSE PRACTITIONER

## 2023-07-26 PROCEDURE — 3078F PR MOST RECENT DIASTOLIC BLOOD PRESSURE < 80 MM HG: ICD-10-PCS | Mod: CPTII,,, | Performed by: NURSE PRACTITIONER

## 2023-07-26 PROCEDURE — 81025 URINE PREGNANCY TEST: CPT | Mod: PBBFAC | Performed by: NURSE PRACTITIONER

## 2023-07-26 PROCEDURE — 87591 N.GONORRHOEAE DNA AMP PROB: CPT | Performed by: NURSE PRACTITIONER

## 2023-07-26 PROCEDURE — 1160F PR REVIEW ALL MEDS BY PRESCRIBER/CLIN PHARMACIST DOCUMENTED: ICD-10-PCS | Mod: CPTII,,, | Performed by: NURSE PRACTITIONER

## 2023-07-26 RX ORDER — ALPRAZOLAM 1 MG/1
1 TABLET ORAL 2 TIMES DAILY
COMMUNITY
Start: 2023-07-12

## 2023-07-26 RX ORDER — ACETAMINOPHEN AND CODEINE PHOSPHATE 120; 12 MG/5ML; MG/5ML
1 SOLUTION ORAL DAILY
Qty: 30 TABLET | Refills: 11 | OUTPATIENT
Start: 2023-07-26 | End: 2024-02-19

## 2023-07-26 NOTE — PROGRESS NOTES
"Patient ID: Ruby Angel is a 38 y.o. female.    Chief Complaint: Annual Exam      Review of patient's allergies indicates:   Allergen Reactions    Benadryl allergy-sinus Shortness Of Breath    Diphenhydramine hcl Swelling    Diphenhyd-pe-acetaminophen          Past Medical History:   Diagnosis Date    Anxiety     Depression     Vitamin D deficiency             HPI:  Pt is  (SABx2) here for annual gyn exam. Last pap 3/2022-NIL; HPV 16/18/45 negative. Pt had csection delivery 10/29/2021. States csection was performed d/t failure to progress.  LMP 2023. Pt has a hx of PCOS/IGT. States has period every 45 days. She was on metformin  prescribed by her pcp but states her pcp discontinued as her glucose/A1C had improved. Pt is sexually active. She would like contraception. States plans to start trying for another pregnancy next year. Pt is smoker. Denies vaginal discharge or pain.     Review of Systems:   Negative except for findings in HPI     Objective:   /75   Pulse 84   Temp 98.3 °F (36.8 °C) (Oral)   Resp 18   Ht 5' 4" (1.626 m)   Wt 69.1 kg (152 lb 4.8 oz)   LMP 2023   SpO2 98%   BMI 26.14 kg/m²    Physical Exam:  GENERAL: Pt is aware and alert and  in no acute distress.  BREASTS: Bilateral-No masses, nipple discharge, skin changes, or tenderness.  ABDOMEN: Soft, non tender.lower transverse scar  VULVA:  No lesions or skin changes.  URETHRA: No lesions  BLADDER: No tenderness.  VAGINA: Mucosa normal,no discharge; no lesions.  CERVIX:  no CMT, scant amount of white discharge; NO lesions  BIMANUAL EXAM: reveals a 10-week-sized uterus. The uterus is mobile, nontender, no palpable masses. Ayden adnexa reveal no evidence of masses; no fullness   SKIN: Warm and Dry  PSYCHIATRIC: Patient is awake and alert. Mood and affect are normal.    Assessment:   Women's annual routine gynecological examination  -     POCT urine pregnancy    Routine screening for STI (sexually transmitted infection)  -   "   Wet Prep, Genital  -     HIV 1/2 Ag/Ab (4th Gen); Future; Expected date: 07/26/2023  -     SYPHILIS ANTIBODY (WITH REFLEX RPR); Future; Expected date: 07/26/2023  -     Hepatitis C Antibody; Future; Expected date: 07/26/2023  -     Hepatitis B Surface Antigen; Future; Expected date: 07/26/2023  -     Chlamydia/GC, PCR    Encounter for contraceptive management, unspecified type    Other orders  -     norethindrone (ORTHO MICRONOR) 0.35 mg tablet; Take 1 tablet (0.35 mg total) by mouth once daily.  Dispense: 30 tablet; Refill: 11            1. Women's annual routine gynecological examination    2. Routine screening for STI (sexually transmitted infection)    3. Encounter for contraceptive management, unspecified type             -pelvic; pap UTD per ACOG guidelines  -UPT negative; discussed contraception and pt desires oral method as she wants another pregnancy next year.Informed pt that she must take at the same time daily and if greater than 3 hours late, could decrease effectiveness. Verbalized understanding   Plan:       Follow up in about 1 year (around 7/26/2024).

## 2023-07-27 ENCOUNTER — LAB VISIT (OUTPATIENT)
Dept: LAB | Facility: HOSPITAL | Age: 38
End: 2023-07-27
Attending: NURSE PRACTITIONER
Payer: MEDICAID

## 2023-07-27 DIAGNOSIS — E04.2 MULTINODULAR THYROID: ICD-10-CM

## 2023-07-27 DIAGNOSIS — Z00.00 WELL ADULT EXAM: ICD-10-CM

## 2023-07-27 DIAGNOSIS — Z11.9 SCREENING EXAMINATION FOR INFECTIOUS DISEASE: ICD-10-CM

## 2023-07-27 DIAGNOSIS — E28.2 PCOS (POLYCYSTIC OVARIAN SYNDROME): ICD-10-CM

## 2023-07-27 DIAGNOSIS — Z11.3 ROUTINE SCREENING FOR STI (SEXUALLY TRANSMITTED INFECTION): ICD-10-CM

## 2023-07-27 DIAGNOSIS — R73.03 PREDIABETES: ICD-10-CM

## 2023-07-27 LAB
ALBUMIN SERPL-MCNC: 4.2 G/DL (ref 3.5–5)
ALBUMIN/GLOB SERPL: 1 RATIO (ref 1.1–2)
ALP SERPL-CCNC: 52 UNIT/L (ref 40–150)
ALT SERPL-CCNC: 15 UNIT/L (ref 0–55)
AST SERPL-CCNC: 25 UNIT/L (ref 5–34)
BASOPHILS # BLD AUTO: 0.01 X10(3)/MCL
BASOPHILS NFR BLD AUTO: 0.2 %
BILIRUBIN DIRECT+TOT PNL SERPL-MCNC: 0.7 MG/DL
BUN SERPL-MCNC: 10.9 MG/DL (ref 7–18.7)
C TRACH DNA SPEC QL NAA+PROBE: NOT DETECTED
CALCIUM SERPL-MCNC: 9.3 MG/DL (ref 8.4–10.2)
CHLORIDE SERPL-SCNC: 107 MMOL/L (ref 98–107)
CO2 SERPL-SCNC: 24 MMOL/L (ref 22–29)
CREAT SERPL-MCNC: 0.72 MG/DL (ref 0.55–1.02)
EOSINOPHIL # BLD AUTO: 0.04 X10(3)/MCL (ref 0–0.9)
EOSINOPHIL NFR BLD AUTO: 0.8 %
ERYTHROCYTE [DISTWIDTH] IN BLOOD BY AUTOMATED COUNT: 13.1 % (ref 11.5–17)
EST. AVERAGE GLUCOSE BLD GHB EST-MCNC: 111.2 MG/DL
GFR SERPLBLD CREATININE-BSD FMLA CKD-EPI: >60 MLS/MIN/1.73/M2
GLOBULIN SER-MCNC: 4.1 GM/DL (ref 2.4–3.5)
GLUCOSE SERPL-MCNC: 93 MG/DL (ref 74–100)
HAV IGM SERPL QL IA: NONREACTIVE
HBA1C MFR BLD: 5.5 %
HBV CORE IGM SERPL QL IA: NONREACTIVE
HBV SURFACE AG SERPL QL IA: NONREACTIVE
HCT VFR BLD AUTO: 41.1 % (ref 37–47)
HCV AB SERPL QL IA: NONREACTIVE
HGB BLD-MCNC: 13.3 G/DL (ref 12–16)
HIV 1+2 AB+HIV1 P24 AG SERPL QL IA: NONREACTIVE
IMM GRANULOCYTES # BLD AUTO: 0.01 X10(3)/MCL (ref 0–0.04)
IMM GRANULOCYTES NFR BLD AUTO: 0.2 %
LYMPHOCYTES # BLD AUTO: 2.13 X10(3)/MCL (ref 0.6–4.6)
LYMPHOCYTES NFR BLD AUTO: 43.3 %
MCH RBC QN AUTO: 27 PG (ref 27–31)
MCHC RBC AUTO-ENTMCNC: 32.4 G/DL (ref 33–36)
MCV RBC AUTO: 83.5 FL (ref 80–94)
MONOCYTES # BLD AUTO: 0.44 X10(3)/MCL (ref 0.1–1.3)
MONOCYTES NFR BLD AUTO: 8.9 %
N GONORRHOEA DNA SPEC QL NAA+PROBE: NOT DETECTED
NEUTROPHILS # BLD AUTO: 2.29 X10(3)/MCL (ref 2.1–9.2)
NEUTROPHILS NFR BLD AUTO: 46.6 %
NRBC BLD AUTO-RTO: 0 %
PLATELET # BLD AUTO: 276 X10(3)/MCL (ref 130–400)
PMV BLD AUTO: 10.3 FL (ref 7.4–10.4)
POTASSIUM SERPL-SCNC: 4 MMOL/L (ref 3.5–5.1)
PROT SERPL-MCNC: 8.3 GM/DL (ref 6.4–8.3)
RBC # BLD AUTO: 4.92 X10(6)/MCL (ref 4.2–5.4)
SODIUM SERPL-SCNC: 140 MMOL/L (ref 136–145)
SOURCE (OHS): NORMAL
T PALLIDUM AB SER QL: NONREACTIVE
WBC # SPEC AUTO: 4.92 X10(3)/MCL (ref 4.5–11.5)

## 2023-07-27 PROCEDURE — 36415 COLL VENOUS BLD VENIPUNCTURE: CPT

## 2023-07-27 PROCEDURE — 80053 COMPREHEN METABOLIC PANEL: CPT

## 2023-07-27 PROCEDURE — 80074 ACUTE HEPATITIS PANEL: CPT

## 2023-07-27 PROCEDURE — 86780 TREPONEMA PALLIDUM: CPT

## 2023-07-27 PROCEDURE — 83036 HEMOGLOBIN GLYCOSYLATED A1C: CPT

## 2023-07-27 PROCEDURE — 85025 COMPLETE CBC W/AUTO DIFF WBC: CPT

## 2023-07-27 PROCEDURE — 87591 N.GONORRHOEAE DNA AMP PROB: CPT

## 2023-07-27 PROCEDURE — 87389 HIV-1 AG W/HIV-1&-2 AB AG IA: CPT

## 2023-07-28 LAB — PATH REV: NORMAL

## 2023-08-02 ENCOUNTER — TELEPHONE (OUTPATIENT)
Dept: INTERNAL MEDICINE | Facility: CLINIC | Age: 38
End: 2023-08-02
Payer: MEDICAID

## 2023-08-02 NOTE — TELEPHONE ENCOUNTER
Pt completed labs and was scheduled for annual/ wellness exam for today, however, she canceled appointment. Please contact to r/s.     Thank you

## 2023-08-09 ENCOUNTER — TELEPHONE (OUTPATIENT)
Dept: INTERNAL MEDICINE | Facility: CLINIC | Age: 38
End: 2023-08-09
Payer: MEDICAID

## 2023-08-23 ENCOUNTER — TELEPHONE (OUTPATIENT)
Dept: INTERNAL MEDICINE | Facility: CLINIC | Age: 38
End: 2023-08-23
Payer: MEDICAID

## 2023-08-23 NOTE — TELEPHONE ENCOUNTER
Patient was scheduled on 8/2/23 for wellness exam and did not show. Please attempt to contact to reschedule.     Thank you

## 2023-09-19 ENCOUNTER — OFFICE VISIT (OUTPATIENT)
Dept: URGENT CARE | Facility: CLINIC | Age: 38
End: 2023-09-19
Payer: MEDICAID

## 2023-09-19 VITALS
HEIGHT: 64 IN | HEART RATE: 67 BPM | BODY MASS INDEX: 25.1 KG/M2 | WEIGHT: 147 LBS | SYSTOLIC BLOOD PRESSURE: 114 MMHG | DIASTOLIC BLOOD PRESSURE: 67 MMHG | OXYGEN SATURATION: 100 % | RESPIRATION RATE: 16 BRPM | TEMPERATURE: 98 F

## 2023-09-19 DIAGNOSIS — R09.81 NASAL CONGESTION: ICD-10-CM

## 2023-09-19 DIAGNOSIS — H60.502 ACUTE OTITIS EXTERNA OF LEFT EAR, UNSPECIFIED TYPE: Primary | ICD-10-CM

## 2023-09-19 PROCEDURE — 99214 OFFICE O/P EST MOD 30 MIN: CPT | Mod: PBBFAC

## 2023-09-19 PROCEDURE — 99213 PR OFFICE/OUTPT VISIT, EST, LEVL III, 20-29 MIN: ICD-10-PCS | Mod: S$PBB,,,

## 2023-09-19 PROCEDURE — 99213 OFFICE O/P EST LOW 20 MIN: CPT | Mod: S$PBB,,,

## 2023-09-19 RX ORDER — OFLOXACIN 3 MG/ML
5 SOLUTION AURICULAR (OTIC) DAILY
Qty: 5 ML | Refills: 0 | Status: SHIPPED | OUTPATIENT
Start: 2023-09-19 | End: 2023-09-26

## 2023-09-19 RX ORDER — PANTOPRAZOLE SODIUM 40 MG/1
40 TABLET, DELAYED RELEASE ORAL
COMMUNITY
Start: 2023-09-18

## 2023-09-19 RX ORDER — LINACLOTIDE 145 UG/1
145 CAPSULE, GELATIN COATED ORAL
COMMUNITY
Start: 2023-09-18

## 2023-09-19 RX ORDER — DEXTROAMPHETAMINE SACCHARATE, AMPHETAMINE ASPARTATE, DEXTROAMPHETAMINE SULFATE AND AMPHETAMINE SULFATE 5; 5; 5; 5 MG/1; MG/1; MG/1; MG/1
TABLET ORAL
COMMUNITY
Start: 2023-09-11 | End: 2024-03-12

## 2023-09-19 RX ORDER — FLUTICASONE PROPIONATE 50 MCG
2 SPRAY, SUSPENSION (ML) NASAL DAILY PRN
Qty: 16 G | Refills: 0 | Status: SHIPPED | OUTPATIENT
Start: 2023-09-19

## 2023-09-19 NOTE — PATIENT INSTRUCTIONS
Tylenol for discomfort. Flonase as needed for nasal congestion. Take ear drops as ordered. Return to the clinic for persistance or worsening symptoms. Go to the nearest ER for worsening symptoms, such as severe pain, chest pain, shortness of breath, palpitations, etc.

## 2023-09-19 NOTE — PROGRESS NOTES
"Subjective:      Patient ID: Ruby Angel is a 38 y.o. female.    Vitals:  height is 5' 4" (1.626 m) and weight is 66.7 kg (147 lb). Her temperature is 98.2 °F (36.8 °C). Her blood pressure is 114/67 and her pulse is 67. Her respiration is 16 and oxygen saturation is 100%.     Chief Complaint: Otalgia (Lt ear pain x 1 week. Denies any drainage. States symptoms started after washing hair.)    Cc as above.     Otalgia   There is pain in the left ear. This is a new problem. The current episode started in the past 7 days. The problem occurs constantly. The problem has been unchanged. There has been no fever. The pain is at a severity of 10/10. The pain is moderate. Pertinent negatives include no coughing, ear discharge, headaches or rhinorrhea. She has tried acetaminophen for the symptoms. The treatment provided moderate relief.       HENT:  Positive for ear pain. Negative for ear discharge.    Respiratory:  Negative for cough.    Neurological:  Negative for headaches.      Objective:     Physical Exam   Constitutional: She is oriented to person, place, and time. She appears well-developed. She is cooperative.  Non-toxic appearance. She does not appear ill. No distress. normalawake  HENT:   Head: Normocephalic and atraumatic.   Ears:   Right Ear: Hearing and external ear normal. No no drainage, swelling or tenderness. A middle ear effusion is present.   Left Ear: Hearing normal. There is tenderness. No no drainage or swelling. A middle ear effusion is present.   Nose: Congestion present. No mucosal edema, rhinorrhea or nasal deformity. No epistaxis. Right sinus exhibits no maxillary sinus tenderness and no frontal sinus tenderness. Left sinus exhibits no maxillary sinus tenderness and no frontal sinus tenderness.   Mouth/Throat: Uvula is midline, oropharynx is clear and moist and mucous membranes are normal. Mucous membranes are moist. No trismus in the jaw. Normal dentition. No uvula swelling. No oropharyngeal " exudate, posterior oropharyngeal edema or posterior oropharyngeal erythema. Oropharynx is clear.   Mild erythema to external ear canal  Moderate nasal turbates      Comments: Mild erythema to external ear canal  Moderate nasal turbates  Eyes: Conjunctivae and lids are normal. No scleral icterus. vision grossly intact periorbital hyperpigmentation   Neck: Trachea normal and phonation normal. Neck supple. No edema present. No erythema present. No neck rigidity present.   Pulmonary/Chest: Effort normal. No respiratory distress. She has no decreased breath sounds. She has no rhonchi.   Abdominal: Normal appearance.   Musculoskeletal: Normal range of motion.         General: No deformity. Normal range of motion.   Neurological: no focal deficit. She is alert and oriented to person, place, and time. She exhibits normal muscle tone. Coordination normal.   Skin: Skin is warm, dry, intact, not diaphoretic and not pale.   Psychiatric: Her speech is normal and behavior is normal. Mood and thought content normal.   Nursing note and vitals reviewed.      Assessment:     1. Acute otitis externa of left ear, unspecified type    2. Nasal congestion        Plan:       Acute otitis externa of left ear, unspecified type  -     ofloxacin (FLOXIN) 0.3 % otic solution; Place 5 drops into the left ear once daily. for 7 days  Dispense: 5 mL; Refill: 0    Nasal congestion  -     fluticasone propionate (FLONASE) 50 mcg/actuation nasal spray; 2 sprays (100 mcg total) by Each Nostril route daily as needed for Rhinitis (nasal congestion).  Dispense: 16 g; Refill: 0     Tylenol for discomfort. Flonase as needed for nasal congestion. Take ear drops as ordered. Return to the clinic for persistance or worsening symptoms.

## 2023-09-27 ENCOUNTER — LAB VISIT (OUTPATIENT)
Dept: LAB | Facility: HOSPITAL | Age: 38
End: 2023-09-27
Attending: INTERNAL MEDICINE
Payer: MEDICAID

## 2023-09-27 DIAGNOSIS — K59.04 CHRONIC IDIOPATHIC CONSTIPATION: Primary | ICD-10-CM

## 2023-09-27 DIAGNOSIS — R10.13 EPIGASTRIC PAIN: ICD-10-CM

## 2023-09-27 DIAGNOSIS — R11.0 NAUSEA: ICD-10-CM

## 2023-09-27 LAB — TSH SERPL-ACNC: 0.61 UIU/ML (ref 0.35–4.94)

## 2023-09-27 PROCEDURE — 36415 COLL VENOUS BLD VENIPUNCTURE: CPT

## 2023-09-27 PROCEDURE — 84443 ASSAY THYROID STIM HORMONE: CPT

## 2023-09-27 PROCEDURE — 86364 TISS TRNSGLTMNASE EA IG CLAS: CPT

## 2023-09-27 PROCEDURE — 86231 EMA EACH IG CLASS: CPT

## 2023-09-29 LAB — TTG IGA SER IA-ACNC: 1.8 U/ML

## 2023-10-02 LAB — ENDOMYSIUM IGA SER QL IF: NEGATIVE

## 2023-10-05 NOTE — TELEPHONE ENCOUNTER
I do not see pt scheduled. Can we please follow up on contacting pt to get scheduled for wellness and discuss labs.     Thank you

## 2023-12-11 DIAGNOSIS — R10.10 UPPER ABDOMINAL PAIN: Primary | ICD-10-CM

## 2023-12-19 ENCOUNTER — HOSPITAL ENCOUNTER (OUTPATIENT)
Dept: RADIOLOGY | Facility: HOSPITAL | Age: 38
Discharge: HOME | End: 2023-12-19
Payer: MEDICAID

## 2023-12-19 DIAGNOSIS — R10.10 UPPER ABDOMINAL PAIN: ICD-10-CM

## 2023-12-19 PROCEDURE — 76700 US EXAM ABDOM COMPLETE: CPT | Mod: TC

## 2024-01-26 ENCOUNTER — HOSPITAL ENCOUNTER (OUTPATIENT)
Dept: RADIOLOGY | Facility: HOSPITAL | Age: 39
Discharge: HOME | End: 2024-01-26
Attending: OTOLARYNGOLOGY
Payer: MEDICAID

## 2024-01-26 DIAGNOSIS — E04.2 MULTINODULAR GOITER (NONTOXIC): ICD-10-CM

## 2024-01-26 PROCEDURE — 76536 US EXAM OF HEAD AND NECK: CPT | Mod: TC

## 2024-02-19 ENCOUNTER — HOSPITAL ENCOUNTER (EMERGENCY)
Facility: HOSPITAL | Age: 39
Discharge: HOME | End: 2024-02-19
Attending: INTERNAL MEDICINE
Payer: MEDICAID

## 2024-02-19 VITALS
HEIGHT: 63 IN | HEART RATE: 76 BPM | RESPIRATION RATE: 18 BRPM | BODY MASS INDEX: 25.5 KG/M2 | OXYGEN SATURATION: 100 % | WEIGHT: 143.94 LBS | DIASTOLIC BLOOD PRESSURE: 66 MMHG | SYSTOLIC BLOOD PRESSURE: 109 MMHG | TEMPERATURE: 99 F

## 2024-02-19 DIAGNOSIS — Z34.91 PREGNANT AND NOT YET DELIVERED IN FIRST TRIMESTER: Primary | ICD-10-CM

## 2024-02-19 DIAGNOSIS — R10.9 ABDOMINAL PAIN: ICD-10-CM

## 2024-02-19 LAB
ALBUMIN SERPL-MCNC: 3.8 G/DL (ref 3.5–5)
ALBUMIN/GLOB SERPL: 0.9 RATIO (ref 1.1–2)
ALP SERPL-CCNC: 52 UNIT/L (ref 40–150)
ALT SERPL-CCNC: 16 UNIT/L (ref 0–55)
APPEARANCE UR: CLEAR
AST SERPL-CCNC: 18 UNIT/L (ref 5–34)
B-HCG UR QL: POSITIVE
BACTERIA #/AREA URNS AUTO: ABNORMAL /HPF
BASOPHILS # BLD AUTO: 0.03 X10(3)/MCL
BASOPHILS NFR BLD AUTO: 0.4 %
BILIRUB SERPL-MCNC: 0.3 MG/DL
BILIRUB UR QL STRIP.AUTO: NEGATIVE
BUN SERPL-MCNC: 8.9 MG/DL (ref 7–18.7)
CALCIUM SERPL-MCNC: 8.9 MG/DL (ref 8.4–10.2)
CHLORIDE SERPL-SCNC: 107 MMOL/L (ref 98–107)
CO2 SERPL-SCNC: 23 MMOL/L (ref 22–29)
COLOR UR AUTO: ABNORMAL
CREAT SERPL-MCNC: 0.77 MG/DL (ref 0.55–1.02)
CTP QC/QA: YES
EOSINOPHIL # BLD AUTO: 0.05 X10(3)/MCL (ref 0–0.9)
EOSINOPHIL NFR BLD AUTO: 0.6 %
ERYTHROCYTE [DISTWIDTH] IN BLOOD BY AUTOMATED COUNT: 13.2 % (ref 11.5–17)
GFR SERPLBLD CREATININE-BSD FMLA CKD-EPI: >60 MLS/MIN/1.73/M2
GLOBULIN SER-MCNC: 4.2 GM/DL (ref 2.4–3.5)
GLUCOSE SERPL-MCNC: 80 MG/DL (ref 74–100)
GLUCOSE UR QL STRIP.AUTO: NORMAL
HCT VFR BLD AUTO: 39.9 % (ref 37–47)
HGB BLD-MCNC: 12.9 G/DL (ref 12–16)
HYALINE CASTS #/AREA URNS LPF: ABNORMAL /LPF
IMM GRANULOCYTES # BLD AUTO: 0.02 X10(3)/MCL (ref 0–0.04)
IMM GRANULOCYTES NFR BLD AUTO: 0.2 %
KETONES UR QL STRIP.AUTO: NEGATIVE
LEUKOCYTE ESTERASE UR QL STRIP.AUTO: 25
LIPASE SERPL-CCNC: 15 U/L
LYMPHOCYTES # BLD AUTO: 1.97 X10(3)/MCL (ref 0.6–4.6)
LYMPHOCYTES NFR BLD AUTO: 23 %
MCH RBC QN AUTO: 27.2 PG (ref 27–31)
MCHC RBC AUTO-ENTMCNC: 32.3 G/DL (ref 33–36)
MCV RBC AUTO: 84 FL (ref 80–94)
MONOCYTES # BLD AUTO: 0.64 X10(3)/MCL (ref 0.1–1.3)
MONOCYTES NFR BLD AUTO: 7.5 %
MUCOUS THREADS URNS QL MICRO: ABNORMAL /LPF
NEUTROPHILS # BLD AUTO: 5.86 X10(3)/MCL (ref 2.1–9.2)
NEUTROPHILS NFR BLD AUTO: 68.3 %
NITRITE UR QL STRIP.AUTO: NEGATIVE
NRBC BLD AUTO-RTO: 0 %
PH UR STRIP.AUTO: 7.5 [PH]
PLATELET # BLD AUTO: 298 X10(3)/MCL (ref 130–400)
PMV BLD AUTO: 10.5 FL (ref 7.4–10.4)
POTASSIUM SERPL-SCNC: 4.2 MMOL/L (ref 3.5–5.1)
PROT SERPL-MCNC: 8 GM/DL (ref 6.4–8.3)
PROT UR QL STRIP.AUTO: ABNORMAL
RBC # BLD AUTO: 4.75 X10(6)/MCL (ref 4.2–5.4)
RBC #/AREA URNS AUTO: ABNORMAL /HPF
RBC UR QL AUTO: NEGATIVE
SODIUM SERPL-SCNC: 137 MMOL/L (ref 136–145)
SP GR UR STRIP.AUTO: 1.03 (ref 1–1.03)
SQUAMOUS #/AREA URNS LPF: ABNORMAL /HPF
UROBILINOGEN UR STRIP-ACNC: NORMAL
WBC # SPEC AUTO: 8.57 X10(3)/MCL (ref 4.5–11.5)
WBC #/AREA URNS AUTO: ABNORMAL /HPF

## 2024-02-19 PROCEDURE — 85025 COMPLETE CBC W/AUTO DIFF WBC: CPT | Performed by: PHYSICIAN ASSISTANT

## 2024-02-19 PROCEDURE — 81025 URINE PREGNANCY TEST: CPT | Performed by: PHYSICIAN ASSISTANT

## 2024-02-19 PROCEDURE — 99284 EMERGENCY DEPT VISIT MOD MDM: CPT

## 2024-02-19 PROCEDURE — 81001 URINALYSIS AUTO W/SCOPE: CPT | Performed by: PHYSICIAN ASSISTANT

## 2024-02-19 PROCEDURE — 80053 COMPREHEN METABOLIC PANEL: CPT | Performed by: PHYSICIAN ASSISTANT

## 2024-02-19 PROCEDURE — 83690 ASSAY OF LIPASE: CPT | Performed by: PHYSICIAN ASSISTANT

## 2024-02-19 RX ORDER — PRENATAL WITH FERROUS FUM AND FOLIC ACID 3080; 920; 120; 400; 22; 1.84; 3; 20; 10; 1; 12; 200; 27; 25; 2 [IU]/1; [IU]/1; MG/1; [IU]/1; MG/1; MG/1; MG/1; MG/1; MG/1; MG/1; UG/1; MG/1; MG/1; MG/1; MG/1
1 TABLET ORAL DAILY
Qty: 30 TABLET | Refills: 2 | Status: SHIPPED | OUTPATIENT
Start: 2024-02-19 | End: 2025-02-18

## 2024-02-19 RX ORDER — ONDANSETRON 4 MG/1
4 TABLET, ORALLY DISINTEGRATING ORAL EVERY 6 HOURS PRN
Qty: 12 TABLET | Refills: 0 | Status: SHIPPED | OUTPATIENT
Start: 2024-02-19 | End: 2024-04-09

## 2024-02-19 NOTE — ED PROVIDER NOTES
Encounter Date: 2024       History     Chief Complaint   Patient presents with    Flank Pain     Bilateral flank pain, pelvic pain, nausea x2 weeks. Denies urinary complaints.      Presents with some pelvic discmfort radiating to her B/L flanks for few weeks, associated to nausea. Hx of anxiety and depression. LMP ; Irregular; A2    The history is provided by the patient.     Review of patient's allergies indicates:   Allergen Reactions    Benadryl allergy-sinus Shortness Of Breath    Diphenhydramine hcl Swelling    Diphenhyd-pe-acetaminophen      Past Medical History:   Diagnosis Date    Anxiety     Depression     Vitamin D deficiency      Past Surgical History:   Procedure Laterality Date     SECTION      COLONOSCOPY W/ BIOPSIES AND POLYPECTOMY  2018    DILATION AND CURETTAGE OF UTERUS  2018     Family History   Problem Relation Age of Onset    Hypertension Father     Diabetes Father     Kidney disease Father     Hypertension Mother     Diabetes Mother     Heart disease Mother     Cervical cancer Mother     Kidney failure Brother     Kidney disease Brother     Autoimmune disease Sister      Social History     Tobacco Use    Smoking status: Every Day     Current packs/day: 0.50     Average packs/day: 0.5 packs/day for 6.3 years (3.1 ttl pk-yrs)     Types: Cigarettes     Start date: 11/10/2017    Smokeless tobacco: Current   Substance Use Topics    Alcohol use: Yes     Alcohol/week: 1.0 standard drink of alcohol     Types: 1 Glasses of wine per week     Comment: Occasionally    Drug use: Never     Review of Systems   Genitourinary:  Positive for menstrual problem and pelvic pain.   Musculoskeletal:  Positive for back pain.   All other systems reviewed and are negative.      Physical Exam     Initial Vitals [24 0832]   BP Pulse Resp Temp SpO2   126/71 74 18 99 °F (37.2 °C) 100 %      MAP       --         Physical Exam    Nursing note and vitals reviewed.  Constitutional: She appears  well-developed and well-nourished. No distress.   HENT:   Head: Normocephalic and atraumatic.   Mouth/Throat: Oropharynx is clear and moist.   Eyes: Conjunctivae are normal. Pupils are equal, round, and reactive to light.   Neck: Neck supple. No JVD present.   Normal range of motion.  Cardiovascular:  Normal rate, regular rhythm, normal heart sounds and intact distal pulses.           Pulmonary/Chest: Breath sounds normal.   Abdominal: Abdomen is soft. Bowel sounds are normal. She exhibits no distension. There is no abdominal tenderness. There is no rebound and no guarding.   Musculoskeletal:         General: No edema. Normal range of motion.      Cervical back: Normal range of motion and neck supple.     Neurological: She is alert and oriented to person, place, and time. She has normal strength. GCS score is 15. GCS eye subscore is 4. GCS verbal subscore is 5. GCS motor subscore is 6.   Skin: Skin is warm and dry. No rash noted.   Psychiatric: Her behavior is normal.         ED Course   ED US Pelvis OB    Date/Time: 2/19/2024 9:05 AM    Performed by: Tj Livingston MD  Authorized by: Tj Livingston MD    Indication:  Pregnancy and Pelvic pain  Identified Structures:  Uterus sagittal, Uterus transverse, Right adnexa, Cul-de-sac/Pouch of Julio C, Left adnexa and Hepatorenal space/Hall's Pouch  Definitive IUP:  Present  Uterine Contents:  Embryo with cardiac activity  Free fluid in cul-de-sac:  Absent  Free fluid in hepatorenal space:  Absent  (bpm):  156  Location of IUP:  Fundus  Myometrial Mantle:  Adequate  Right adnexa:  Normal  Left adnexa:  Normal  Other:  Approx 9 WGA  Impression:  IUP  Charge?:  No (educational)    Labs Reviewed   URINALYSIS, REFLEX TO URINE CULTURE - Abnormal; Notable for the following components:       Result Value    Protein, UA Trace (*)     Leukocyte Esterase, UA 25 (*)     Bacteria, UA Trace (*)     Squamous Epithelial Cells, UA Few (*)     Mucous, UA  Trace (*)     All other components within normal limits   CBC WITH DIFFERENTIAL - Abnormal; Notable for the following components:    MCHC 32.3 (*)     MPV 10.5 (*)     All other components within normal limits   POCT URINE PREGNANCY - Abnormal; Notable for the following components:    POC Preg Test, Ur Positive (*)     All other components within normal limits   CBC W/ AUTO DIFFERENTIAL    Narrative:     The following orders were created for panel order CBC auto differential.  Procedure                               Abnormality         Status                     ---------                               -----------         ------                     CBC with Differential[[id number]]       Abnormal            Final result                 Please view results for these tests on the individual orders.   COMPREHENSIVE METABOLIC PANEL   LIPASE          Imaging Results    None          Medications - No data to display  Medical Decision Making  Amount and/or Complexity of Data Reviewed  Labs: ordered. Decision-making details documented in ED Course.    Risk  Prescription drug management.      Additional MDM:   Differential Diagnosis:   Other: The following diagnoses were also considered and will be evaluated: Pregnancy, UTI and Kidney stones. .jjdiff                                    Clinical Impression:  Final diagnoses:  [R10.9] Abdominal pain  [Z34.91] Pregnant and not yet delivered in first trimester (Primary)          ED Disposition Condition    Discharge Stable          ED Prescriptions       Medication Sig Dispense Start Date End Date Auth. Provider    PNV,calcium 72/iron/folic acid (PRENATAL VITAMIN) Tab Take 1 tablet by mouth once daily. 30 tablet 2/19/2024 2/18/2025 Tj Livingston MD          Follow-up Information       Follow up With Specialties Details Why Contact Info    Ania Espinal NP Nurse Practitioner Schedule an appointment as soon as possible for a visit in 2 weeks  [address] [facility]  [facility] [address]  [phone]      [facility] Emergency Medicine  If symptoms worsen [address] [facility] [address] [phone]             Tj Livingston MD  02/19/24 [id number]

## 2024-02-19 NOTE — FIRST PROVIDER EVALUATION
Medical screening examination initiated.  I have conducted a focused provider triage encounter, findings are as follows:    Brief history of present illness:  Patient presents with lower abdominal pain and flank pain for 2 weeks. Also endorses nausea and constipation.     There were no vitals filed for this visit.    Pertinent physical exam:  Vitals stable. In NAD. Abdomen is non-distended.    Brief workup plan:  Labs     Preliminary workup initiated; this workup will be continued and followed by the physician or advanced practice provider that is assigned to the patient when roomed.

## 2024-03-12 ENCOUNTER — OFFICE VISIT (OUTPATIENT)
Dept: FAMILY MEDICINE | Facility: CLINIC | Age: 39
End: 2024-03-12
Payer: MEDICAID

## 2024-03-12 ENCOUNTER — HOSPITAL ENCOUNTER (OUTPATIENT)
Dept: RADIOLOGY | Facility: HOSPITAL | Age: 39
Discharge: HOME | End: 2024-03-12
Attending: OBSTETRICS & GYNECOLOGY
Payer: MEDICAID

## 2024-03-12 VITALS
OXYGEN SATURATION: 100 % | DIASTOLIC BLOOD PRESSURE: 75 MMHG | RESPIRATION RATE: 20 BRPM | SYSTOLIC BLOOD PRESSURE: 118 MMHG | BODY MASS INDEX: 25.52 KG/M2 | TEMPERATURE: 98 F | WEIGHT: 144 LBS | HEART RATE: 70 BPM | HEIGHT: 63 IN

## 2024-03-12 DIAGNOSIS — Z3A.11 11 WEEKS GESTATION OF PREGNANCY: Primary | ICD-10-CM

## 2024-03-12 DIAGNOSIS — Z34.91 PREGNANT AND NOT YET DELIVERED IN FIRST TRIMESTER: ICD-10-CM

## 2024-03-12 DIAGNOSIS — Z34.90 PREGNANCY: ICD-10-CM

## 2024-03-12 LAB
APPEARANCE UR: CLEAR
BACTERIA #/AREA URNS AUTO: ABNORMAL /HPF
BASOPHILS # BLD AUTO: 0.02 X10(3)/MCL
BASOPHILS NFR BLD AUTO: 0.2 %
BILIRUB SERPL-MCNC: NORMAL MG/DL
BILIRUB UR QL STRIP.AUTO: NEGATIVE
BLOOD URINE, POC: NORMAL
C TRACH DNA SPEC QL NAA+PROBE: NOT DETECTED
CLARITY, POC UA: CLEAR
COLOR UR AUTO: ABNORMAL
COLOR, POC UA: YELLOW
EOSINOPHIL # BLD AUTO: 0.04 X10(3)/MCL (ref 0–0.9)
EOSINOPHIL NFR BLD AUTO: 0.5 %
ERYTHROCYTE [DISTWIDTH] IN BLOOD BY AUTOMATED COUNT: 13.1 % (ref 11.5–17)
GLUCOSE UR QL STRIP.AUTO: NORMAL
GLUCOSE UR QL STRIP: NORMAL
GROUP & RH: NORMAL
HBV SURFACE AG SERPL QL IA: NONREACTIVE
HCT VFR BLD AUTO: 39.5 % (ref 37–47)
HCV AB SERPL QL IA: NONREACTIVE
HGB BLD-MCNC: 13.5 G/DL (ref 12–16)
HIV 1+2 AB+HIV1 P24 AG SERPL QL IA: NONREACTIVE
HYALINE CASTS #/AREA URNS LPF: ABNORMAL /LPF
IMM GRANULOCYTES # BLD AUTO: 0.02 X10(3)/MCL (ref 0–0.04)
IMM GRANULOCYTES NFR BLD AUTO: 0.2 %
INDIRECT COOMBS: NORMAL
KETONES UR QL STRIP.AUTO: NEGATIVE
KETONES UR QL STRIP: NORMAL
LEUKOCYTE ESTERASE UR QL STRIP.AUTO: NEGATIVE
LEUKOCYTE ESTERASE URINE, POC: NORMAL
LYMPHOCYTES # BLD AUTO: 1.97 X10(3)/MCL (ref 0.6–4.6)
LYMPHOCYTES NFR BLD AUTO: 22.9 %
MCH RBC QN AUTO: 28.4 PG (ref 27–31)
MCHC RBC AUTO-ENTMCNC: 34.2 G/DL (ref 33–36)
MCV RBC AUTO: 83.2 FL (ref 80–94)
MONOCYTES # BLD AUTO: 0.7 X10(3)/MCL (ref 0.1–1.3)
MONOCYTES NFR BLD AUTO: 8.1 %
MUCOUS THREADS URNS QL MICRO: ABNORMAL /LPF
N GONORRHOEA DNA SPEC QL NAA+PROBE: NOT DETECTED
NEUTROPHILS # BLD AUTO: 5.84 X10(3)/MCL (ref 2.1–9.2)
NEUTROPHILS NFR BLD AUTO: 68.1 %
NITRITE UR QL STRIP.AUTO: NEGATIVE
NITRITE, POC UA: NORMAL
NRBC BLD AUTO-RTO: 0 %
PH UR STRIP.AUTO: 7.5 [PH]
PH, POC UA: 8
PLATELET # BLD AUTO: 317 X10(3)/MCL (ref 130–400)
PMV BLD AUTO: 10.5 FL (ref 7.4–10.4)
PROT UR QL STRIP.AUTO: ABNORMAL
PROTEIN, POC: NORMAL
RBC # BLD AUTO: 4.75 X10(6)/MCL (ref 4.2–5.4)
RBC #/AREA URNS AUTO: ABNORMAL /HPF
RBC UR QL AUTO: NEGATIVE
SOURCE (OHS): NORMAL
SP GR UR STRIP.AUTO: 1.02 (ref 1–1.03)
SPECIFIC GRAVITY, POC UA: 1.02
SPECIMEN OUTDATE: NORMAL
SQUAMOUS #/AREA URNS LPF: ABNORMAL /HPF
T PALLIDUM AB SER QL: NONREACTIVE
UROBILINOGEN UR STRIP-ACNC: NORMAL
UROBILINOGEN, POC UA: 0.2
WBC # SPEC AUTO: 8.59 X10(3)/MCL (ref 4.5–11.5)
WBC #/AREA URNS AUTO: ABNORMAL /HPF

## 2024-03-12 PROCEDURE — 86803 HEPATITIS C AB TEST: CPT

## 2024-03-12 PROCEDURE — 87086 URINE CULTURE/COLONY COUNT: CPT

## 2024-03-12 PROCEDURE — 86762 RUBELLA ANTIBODY: CPT

## 2024-03-12 PROCEDURE — 87340 HEPATITIS B SURFACE AG IA: CPT

## 2024-03-12 PROCEDURE — 86850 RBC ANTIBODY SCREEN: CPT

## 2024-03-12 PROCEDURE — 85660 RBC SICKLE CELL TEST: CPT

## 2024-03-12 PROCEDURE — 36415 COLL VENOUS BLD VENIPUNCTURE: CPT

## 2024-03-12 PROCEDURE — 86780 TREPONEMA PALLIDUM: CPT

## 2024-03-12 PROCEDURE — 87491 CHLMYD TRACH DNA AMP PROBE: CPT

## 2024-03-12 PROCEDURE — 81002 URINALYSIS NONAUTO W/O SCOPE: CPT | Mod: PBBFAC

## 2024-03-12 PROCEDURE — 87624 HPV HI-RISK TYP POOLED RSLT: CPT

## 2024-03-12 PROCEDURE — 88174 CYTOPATH C/V AUTO IN FLUID: CPT

## 2024-03-12 PROCEDURE — 87389 HIV-1 AG W/HIV-1&-2 AB AG IA: CPT

## 2024-03-12 PROCEDURE — 86787 VARICELLA-ZOSTER ANTIBODY: CPT

## 2024-03-12 PROCEDURE — 81001 URINALYSIS AUTO W/SCOPE: CPT

## 2024-03-12 PROCEDURE — 99215 OFFICE O/P EST HI 40 MIN: CPT | Mod: PBBFAC,25

## 2024-03-12 PROCEDURE — 85025 COMPLETE CBC W/AUTO DIFF WBC: CPT

## 2024-03-12 PROCEDURE — 76801 OB US < 14 WKS SINGLE FETUS: CPT | Mod: TC

## 2024-03-12 NOTE — PATIENT INSTRUCTIONS
Well Child Exam    About this topic  A well child exam is a visit with your child's doctor to check your child's health. The doctor will check your child's growth, progress, and shot record. It is also a time for you to ask your child's doctor any questions you have about your child's health. Your child will have a full exam during the office visit. Other things that are sometimes checked are hearing, eyesight, and urine or blood tests. The doctor may give shots during your child's well visit.    General    Getting Ready for a Well Child Exam    A well child exam is a good time for you to talk with your child's doctor about any of these topics:    Eating habits or diet    How your child acts    Sleep issues    Growth    Safety    Vaccines    Toilet training    Teen years    How your child is doing in school or any learning concerns    Home life    You may want to make a written list of the things you want to talk about with your child's doctor. Be sure to bring your list of questions to your child's well visit. You may also want to do some research on your own before your office visit by reading books or looking at Web sites. Other family members, child caregivers, and grandparents may be able to help you too. Your child's doctor may ask also you about your family's health history or if your child is around anyone who smokes.    The Exam    The doctor measures your child's weight, height, and sometimes head size or body mass index (BMI). The doctor plots these numbers on a growth curve. The growth curve gives a picture of your baby's growth at each visit. The doctor may check your child's temperature, blood pressure, breathing, and heart rate. The doctor may listen to your child's heart, lungs, and belly. Your doctor will do a full exam of your child from the head to the toes.    Growth and Development Questions    Your doctor will ask you about your child's progress. The doctor will focus on the skills that are  likely to happen at your child's age. Some of these are motor skills like rolling over, walking, and running, while others are social skills, or how your child interacts with other people. Your child's doctor will also ask you how your child is doing in school.    Help for Parents    Your doctor will talk with you about any concerns you have about your child during this visit. The doctor may also talk with you about:    Getting family help or other support    Ways to help your child's brain growth    How your child plays and acts with others    Ways to help your child exercise    Safety    Eating habits    Vaccines    Quitting smoking    Help if you have a low mood after having a baby    Shots or Vaccines    It is important for your child to get shots on time. This protects from very serious illnesses like pertussis, measles, or some kinds of pneumonia. Sometimes, your child may need more than one dose of vaccine. The vaccines used today are safer than ever. Talk to your doctor if you have any questions or concerns about giving your child vaccines.    Well Child Exam Schedule    The American Academy of Pediatrics (AAP) suggests this plan for well child visits:    [place] (3 to 5 days old)    1 month old    2 months old    4 months old    6 months old    9 months old    12 months old    15 months old    18 months old    2 years old    30 months old    3 years old    4 years old    Once each year until age 21    Well child exams are very important. Since your child is healthy at this visit and it is scheduled ahead of time, you can think about things you want to ask your child's doctor. Be sure to follow the above plan for well child visits as well as any other visits your child's doctor suggests.    Where can I learn more?    Centers for Disease Control and Prevention    http://www.cdc.gov/vaccines     Healthy  Children    https://www.healthychildren.org/English/family-life/health-management/Pages/Well-Child-Care-A-Check-Up-for-Success.aspx    Disclaimer.  This generalized information is a limited summary of diagnosis, treatment, and/or medication information. It is not meant to be comprehensive and should be used as a tool to help the user understand and/or assess potential diagnostic and treatment options. It does NOT include all information about conditions, treatments, medications, side effects, or risks that may apply to a specific patient. It is not intended to be medical advice or a substitute for the medical advice, diagnosis, or treatment of a health care provider based on the health care provider's examination and assessment of a patients specific and unique circumstances. Patients must speak with a health care provider for complete information about their health, medical questions, and treatment options, including any risks or benefits regarding use of medications. This information does not endorse any treatments or medications as safe, effective, or approved for treating a specific patient. [employer] and its affiliates disclaim any warranty or liability relating to this information or the use thereof. The use of this information is governed by the Terms of Use, available at Terms of Use. ©2022 [employer] and its affiliates and/or licensors. All rights reserved.

## 2024-03-12 NOTE — PROGRESS NOTES
Initial OB Office Visit Note    Subjective:       Patient ID: Ruby Angel is a 39 y.o. female.    Chief Complaint: Initial Prenatal Visit (IOB, c/o frequent nausea.)      Ruby Angel is a 39 y.o.  at 11w3d WGA by LMP .  (YOMI: 23)    Current issues: Nausea. No vomiting. Was given Zofran but has not been taking it.    Chronic issues: History of gestational DM. ADHD. Herpes, last flare was one month ago. Last took medication for it 5 years ago    Gestational History:   OB History    Para Term  AB Living   4 1 1 0 2 1   SAB IAB Ectopic Multiple Live Births   2 0 0 0 1      # Outcome Date GA Lbr Luis/2nd Weight Sex Delivery Anes PTL Lv   4 Current            3 Term 10/29/21   3.09 kg (6 lb 13 oz) M CS-Unspec EPI N ESTUARDO      Complications: Failure to Progress in Second Stage   2 SAB 2018        FD   1 SAB 2018        FD       GYNHx:   LMP: Patient's last menstrual period was 2023 (exact date).   Menarche at 15   Menstrual Hx:  began regular, but became irregular , 5 day cycles, 7 pads/day, says flow was leight,   Hx of birth control: OCP (estrogen/progesterone)   Hx of STDs: HSV,    positive for HPV, ASCUS in  History of Abnormal PAP   3/2/2022      PMHx:   Past Medical History:   Diagnosis Date    Abnormal Pap smear of cervix     Anemia     Anxiety     Depression     Diabetes mellitus     Herpes simplex virus (HSV) infection     Hypertension     Postpartum depression     Vitamin D deficiency        PSHx:   Past Surgical History:   Procedure Laterality Date     SECTION      COLONOSCOPY W/ BIOPSIES AND POLYPECTOMY  2018    DILATION AND CURETTAGE OF UTERUS  2018       Meds:   Prior to Admission medications    Medication Sig Start Date End Date Taking? Authorizing Provider   ALPRAZolam (XANAX) 1 MG tablet Take 1 mg by mouth 2 (two) times daily. 23   Provider, Historical   cholecalciferol, vitamin D3, (VITAMIN D3) 50 mcg (2,000 unit) Tab Take 2,000 Units by  mouth.    Provider, Historical   dextroamphetamine-amphetamine (ADDERALL) 20 mg tablet Take by mouth. 9/11/23   Provider, Historical   dextroamphetamine-amphetamine 10 mg Tab Take 1 tablet by mouth 2 (two) times daily. 6/7/23   Provider, Historical   fluticasone propionate (FLONASE) 50 mcg/actuation nasal spray 2 sprays (100 mcg total) by Each Nostril route daily as needed for Rhinitis (nasal congestion). 9/19/23   Lynn Calzada FNP   LINZESS 145 mcg Cap capsule Take 145 mcg by mouth. 9/18/23   Provider, Historical   metFORMIN (GLUCOPHAGE) 500 MG tablet Take 1 tablet (500 mg total) by mouth daily with breakfast.  Patient not taking: Reported on 9/19/2023 8/2/22   Ania Espinal, NP   mirtazapine (REMERON) 15 MG tablet Take 15 mg by mouth every evening. 5/5/23   Provider, Historical   ondansetron (ZOFRAN-ODT) 4 MG TbDL Take 1 tablet (4 mg total) by mouth every 6 (six) hours as needed (nausea). 2/19/24   Tj Livingston MD   pantoprazole (PROTONIX) 40 MG tablet Take 40 mg by mouth. 9/18/23   Provider, Historical   PNV,calcium 72/iron/folic acid (PRENATAL VITAMIN) Tab Take 1 tablet by mouth once daily. 2/19/24 2/18/25  Tj Livingston MD       Allergies:   Review of patient's allergies indicates:   Allergen Reactions    Benadryl allergy-sinus Shortness Of Breath    Diphenhydramine hcl Swelling    Diphenhyd-pe-acetaminophen        SH:   Social History     Socioeconomic History    Marital status:    Tobacco Use    Smoking status: Former     Current packs/day: 0.50     Average packs/day: 0.5 packs/day for 6.3 years (3.2 ttl pk-yrs)     Types: Cigarettes     Start date: 11/10/2017     Passive exposure: Never    Smokeless tobacco: Current   Substance and Sexual Activity    Alcohol use: Not Currently     Alcohol/week: 1.0 standard drink of alcohol     Types: 1 Glasses of wine per week     Comment: Occasionally    Drug use: Never    Sexual activity: Yes     Partners: Male     Birth  control/protection: Abstinence     Social Determinants of Health     Financial Resource Strain: Medium Risk (3/10/2024)    Overall Financial Resource Strain (CARDIA)     Difficulty of Paying Living Expenses: Somewhat hard   Food Insecurity: Food Insecurity Present (3/10/2024)    Hunger Vital Sign     Worried About Running Out of Food in the Last Year: Sometimes true     Ran Out of Food in the Last Year: Never true   Transportation Needs: No Transportation Needs (3/10/2024)    PRAPARE - Transportation     Lack of Transportation (Medical): No     Lack of Transportation (Non-Medical): No   Physical Activity: Sufficiently Active (3/10/2024)    Exercise Vital Sign     Days of Exercise per Week: 3 days     Minutes of Exercise per Session: 60 min   Stress: No Stress Concern Present (3/10/2024)    [employer] - Occupational Stress Questionnaire     Feeling of Stress : Only a little   Social Connections: Unknown (3/10/2024)    Social Connection and Isolation Panel [NHANES]     Frequency of Communication with Friends and Family: More than three times a week     Frequency of Social Gatherings with Friends and Family: Never     Active Member of Clubs or Organizations: No     Attends Club or Organization Meetings: Never     Marital Status:    Housing Stability: Low Risk  (3/10/2024)    Housing Stability Vital Sign     Unable to Pay for Housing in the Last Year: No     Number of Places Lived in the Last Year: 2     Unstable Housing in the Last Year: No       FHx:   Family History   Problem Relation Age of Onset    Hypertension Father     Diabetes Father     Kidney disease Father     Hypertension Mother     Diabetes Mother     Heart disease Mother     Cervical cancer Mother     Kidney failure Brother     Kidney disease Brother     Autoimmune disease Sister          Review of Systems   Constitutional:  Negative for activity change and unexpected weight change.   HENT:  Negative for hearing loss,  "rhinorrhea and trouble swallowing.    Eyes:  Negative for discharge and visual disturbance.   Respiratory:  Negative for chest tightness and wheezing.    Cardiovascular:  Negative for chest pain and palpitations.   Gastrointestinal:  Positive for constipation. Negative for blood in stool, diarrhea and vomiting.   Endocrine: Positive for polyuria. Negative for polydipsia.   Genitourinary:  Negative for difficulty urinating, dysuria, hematuria and menstrual problem.   Musculoskeletal:  Negative for arthralgias, joint swelling and neck pain.   Neurological:  Positive for headaches. Negative for weakness.   Psychiatric/Behavioral:  Negative for confusion and dysphoric mood.        Objective:      Vitals:    03/12/24 0943   BP: 118/75   BP Location: Left arm   Patient Position: Sitting   BP Method: Medium (Automatic)   Pulse: 70   Resp: 20   Temp: 98.3 °F (36.8 °C)   TempSrc: Oral   SpO2: 100%   Weight: 65.3 kg (144 lb)   Height: 5' 3" (1.6 m)     Physical Exam  Cardiovascular:      Rate and Rhythm: Normal rate and regular rhythm.      Heart sounds: No murmur heard.  Pulmonary:      Breath sounds: No wheezing or rhonchi.   Abdominal:      Palpations: Abdomen is soft.   Genitourinary:     General: Normal vulva.      Vagina: No signs of injury. No bleeding or lesions.      Cervix: No cervical motion tenderness.      Adnexa:         Right: No mass or tenderness.          Left: No mass or tenderness.     Neurological:      Mental Status: She is alert.       : Fundal height palpable at 11 cm; vulva without lesions; vagina without bleeding or discharge; presenting       Lab Results   Component Value Date    COLORU Yellow 10/26/2021    SPECGRAV 1.020 10/26/2021    PHUR 7 10/26/2021    NITRITE Negative 10/26/2021    PROTEINPOC 1+ (30 mg/dl) 10/26/2021    GLUCOSEUR Negative 10/26/2021    KETONESU Trace 10/26/2021    UROBILINOGEN Normal 02/19/2024    BILIRUBINPOC Negative 10/26/2021    RBCUR Negative 10/26/2021       Current " Outpatient Medications:     ALPRAZolam (XANAX) 1 MG tablet, Take 1 mg by mouth 2 (two) times daily., Disp: , Rfl:     cholecalciferol, vitamin D3, (VITAMIN D3) 50 mcg (2,000 unit) Tab, Take 2,000 Units by mouth., Disp: , Rfl:     dextroamphetamine-amphetamine 10 mg Tab, Take 1 tablet by mouth 2 (two) times daily., Disp: , Rfl:     fluticasone propionate (FLONASE) 50 mcg/actuation nasal spray, 2 sprays (100 mcg total) by Each Nostril route daily as needed for Rhinitis (nasal congestion)., Disp: 16 g, Rfl: 0    LINZESS 145 mcg Cap capsule, Take 145 mcg by mouth., Disp: , Rfl:     mirtazapine (REMERON) 15 MG tablet, Take 15 mg by mouth every evening., Disp: , Rfl:     ondansetron (ZOFRAN-ODT) 4 MG TbDL, Take 1 tablet (4 mg total) by mouth every 6 (six) hours as needed (nausea)., Disp: 12 tablet, Rfl: 0    pantoprazole (PROTONIX) 40 MG tablet, Take 40 mg by mouth., Disp: , Rfl:     PNV,calcium 72/iron/folic acid (PRENATAL VITAMIN) Tab, Take 1 tablet by mouth once daily., Disp: 30 tablet, Rfl: 2     General:   RESP: clear to auscultation bilaterally, non labored  CV: regular rate and rhythm, no murmurs, no edema  ABD: gravid, nontender, Bs+  FHTs: 168 bpm; (location)  Fundal height: 11 cm  Cervix:11 cm,    Ultrasound  - 20 wk anatomy US:      Initial OB Labs: Ordered on 3/12/24  - Blood Type and Rh: ordered   - Antibody Screen: ordered   - CBC H/H: ordered   - HIV: ordered   - RPR: ordered   - GC: ordered   - CT: ordered   - HBsAg: ordered   - HCVAb: ordered   - Rubella: ordered   - Varicella: ordered   - UA & Culture: ordered   - Sickle Cell Screen: ordered   - PAP: ordered   - Influenza vaccine date: ordered     15-20 Weeks: Lab Ordered TBD  - Quad Screen: TBD    28 Week Lab: Ordered TBD  - 1H GTT: TBD  - Rhogam: TBD  - Date of Tdap: TBD  - CBC H/H: TBD  - RPR: TBD  - BTL consent: TBD    36 Week Lab: Ordered TBD  - CBC H/H: TBD  - RPR: TBD  - GBS Culture: TBD  - HIV: TBD  - Cervical GC: TBD    Urine dip:  Lab Results    Component Value Date    COLORU Yellow 10/26/2021    SPECGRAV 1.020 10/26/2021    PHUR 7 10/26/2021    NITRITE Negative 10/26/2021    PROTEINPOC 1+ (30 mg/dl) 10/26/2021    GLUCOSEUR Negative 10/26/2021    KETONESU Trace 10/26/2021    UROBILINOGEN Normal 02/19/2024    BILIRUBINPOC Negative 10/26/2021    RBCUR Negative 10/26/2021     Assessment:       1. 11 weeks gestation of pregnancy    2. Pregnant and not yet delivered in first trimester           Plan:         11 weeks gestation of pregnancy  -     Liquid-Based Pap Smear, Screening Screening  -     POCT urine dipstick without microscope    Pregnant and not yet delivered in first trimester  -     Ambulatory referral/consult to Family Practice  -     Liquid-Based Pap Smear, Screening Screening  -     Sickle Cell Screen; Future; Expected date: 03/12/2024  -     Varicella Zoster Antibody, IgG; Future; Expected date: 03/12/2024  -     Rubella Antibody, IgG; Future; Expected date: 03/12/2024  -     Hepatitis C Antibody; Future; Expected date: 03/12/2024  -     Hepatitis B Surface Antigen; Future; Expected date: 03/12/2024  -     Chlamydia/GC, PCR  -     SYPHILIS ANTIBODY (WITH REFLEX RPR); Future; Expected date: 03/12/2024  -     HIV 1/2 Ag/Ab (4th Gen); Future; Expected date: 03/12/2024  -     CBC Auto Differential; Future; Expected date: 03/12/2024  -     Type & Screen; Future; Expected date: 03/12/2024               Indra Sanches MD  [facility], PGY-1

## 2024-03-13 PROBLEM — Z86.19 HISTORY OF ELISA POSITIVE FOR HSV: Status: ACTIVE | Noted: 2024-03-13

## 2024-03-13 LAB
HGB S BLD QL SOLY: NEGATIVE
RUBV IGG SERPL IA-ACNC: 1.7
RUBV IGG SERPL QL IA: POSITIVE
VZV IGG SER IA-ACNC: 3.7
VZV IGG SER QL IA: POSITIVE

## 2024-03-13 NOTE — PROGRESS NOTES
Pre of HSV prophylaxis at 36 weeks      I have personally reviewed the review of systems (ROS) and past, family and social histories (PFSH) documented above by the resident.  I have reviewed the care furnished by the resident during the encounter, including a review of the patient's medical history, the resident's findings on physical examination, diagnosis, and the treatment plan.  I participated in the management of the patient and was immediately available throughout the encounter.   I was physically present during all key portions of the service(s) provided with the resident.  Services were furnished in a primary care center located in the outpatient department of a [facility].

## 2024-03-14 LAB — BACTERIA UR CULT: NORMAL

## 2024-03-18 LAB — PSYCHE PATHOLOGY RESULT: NORMAL

## 2024-04-09 ENCOUNTER — OFFICE VISIT (OUTPATIENT)
Dept: FAMILY MEDICINE | Facility: CLINIC | Age: 39
End: 2024-04-09
Payer: MEDICAID

## 2024-04-09 VITALS
HEART RATE: 89 BPM | HEIGHT: 63 IN | RESPIRATION RATE: 18 BRPM | TEMPERATURE: 98 F | BODY MASS INDEX: 26.4 KG/M2 | OXYGEN SATURATION: 100 % | WEIGHT: 149 LBS | DIASTOLIC BLOOD PRESSURE: 71 MMHG | SYSTOLIC BLOOD PRESSURE: 110 MMHG

## 2024-04-09 DIAGNOSIS — O09.299 HISTORY OF PRE-ECLAMPSIA IN PRIOR PREGNANCY, CURRENTLY PREGNANT: ICD-10-CM

## 2024-04-09 DIAGNOSIS — Z86.19 HISTORY OF ELISA POSITIVE FOR HSV: ICD-10-CM

## 2024-04-09 DIAGNOSIS — O09.292 HX OF PREECLAMPSIA, PRIOR PREGNANCY, CURRENTLY PREGNANT, SECOND TRIMESTER: ICD-10-CM

## 2024-04-09 DIAGNOSIS — Z86.39 HISTORY OF DIABETES MELLITUS: ICD-10-CM

## 2024-04-09 DIAGNOSIS — Z3A.15 15 WEEKS GESTATION OF PREGNANCY: Primary | ICD-10-CM

## 2024-04-09 DIAGNOSIS — K59.00 CONSTIPATION, UNSPECIFIED CONSTIPATION TYPE: ICD-10-CM

## 2024-04-09 PROBLEM — O10.919 CHRONIC HYPERTENSION IN PREGNANCY: Status: RESOLVED | Noted: 2021-07-06 | Resolved: 2024-04-09

## 2024-04-09 LAB
BILIRUB SERPL-MCNC: NEGATIVE MG/DL
BLOOD URINE, POC: NEGATIVE
CLARITY, POC UA: NORMAL
COLOR, POC UA: YELLOW
EST. AVERAGE GLUCOSE BLD GHB EST-MCNC: 105.4 MG/DL
GLUCOSE UR QL STRIP: NEGATIVE
HBA1C MFR BLD: 5.3 %
KETONES UR QL STRIP: NEGATIVE
LEUKOCYTE ESTERASE URINE, POC: NEGATIVE
NITRITE, POC UA: NEGATIVE
PH, POC UA: 7
PROTEIN, POC: NEGATIVE
SPECIFIC GRAVITY, POC UA: 1.02
UROBILINOGEN, POC UA: 0.2

## 2024-04-09 PROCEDURE — 99215 OFFICE O/P EST HI 40 MIN: CPT | Mod: PBBFAC

## 2024-04-09 PROCEDURE — 36415 COLL VENOUS BLD VENIPUNCTURE: CPT

## 2024-04-09 PROCEDURE — 81002 URINALYSIS NONAUTO W/O SCOPE: CPT | Mod: PBBFAC

## 2024-04-09 PROCEDURE — 83036 HEMOGLOBIN GLYCOSYLATED A1C: CPT

## 2024-04-09 RX ORDER — LANCETS
1 EACH MISCELLANEOUS 2 TIMES DAILY
Qty: 100 EACH | Refills: 1 | Status: SHIPPED | OUTPATIENT
Start: 2024-04-09 | End: 2024-04-29 | Stop reason: SDUPTHER

## 2024-04-09 RX ORDER — DOCUSATE SODIUM 100 MG/1
100 CAPSULE, LIQUID FILLED ORAL 2 TIMES DAILY
Qty: 60 CAPSULE | Refills: 1 | Status: SHIPPED | OUTPATIENT
Start: 2024-04-09 | End: 2024-04-29

## 2024-04-09 RX ORDER — INSULIN PUMP SYRINGE, 3 ML
EACH MISCELLANEOUS
Qty: 1 EACH | Refills: 0 | Status: SHIPPED | OUTPATIENT
Start: 2024-04-09 | End: 2024-04-09

## 2024-04-09 RX ORDER — DEXTROAMPHETAMINE SACCHARATE, AMPHETAMINE ASPARTATE, DEXTROAMPHETAMINE SULFATE AND AMPHETAMINE SULFATE 7.5; 7.5; 7.5; 7.5 MG/1; MG/1; MG/1; MG/1
1 TABLET ORAL 2 TIMES DAILY
COMMUNITY
Start: 2024-03-12 | End: 2024-04-29

## 2024-04-09 RX ORDER — ASPIRIN 81 MG/1
81 TABLET ORAL DAILY
Qty: 30 TABLET | Refills: 0 | Status: SHIPPED | OUTPATIENT
Start: 2024-04-09

## 2024-04-09 RX ORDER — LANCETS
1 EACH MISCELLANEOUS 2 TIMES DAILY
Qty: 100 EACH | Refills: 1 | Status: SHIPPED | OUTPATIENT
Start: 2024-04-09 | End: 2024-04-09

## 2024-04-09 RX ORDER — LINACLOTIDE 290 UG/1
290 CAPSULE, GELATIN COATED ORAL
COMMUNITY
Start: 2023-12-07 | End: 2024-04-29

## 2024-04-09 RX ORDER — INSULIN PUMP SYRINGE, 3 ML
EACH MISCELLANEOUS
Qty: 1 EACH | Refills: 0 | Status: SHIPPED | OUTPATIENT
Start: 2024-04-09 | End: 2024-04-29 | Stop reason: SDUPTHER

## 2024-04-09 NOTE — PROGRESS NOTES
"  Initial OB Office Visit Note    Subjective:       Patient ID: Ruby Angel is a 39 y.o. female.    Chief Complaint: Routine Prenatal Visit (15W0D. No complaints.)      Ruby Angel is a 39 y.o.  at 15w0d WGA by LMP .  (YOMI: 23)    Current issues:  - Denies nausea or vomiting. States has resolved without use of medications.  - Has history of constipation prior to this pregnancy that she usually took Linzess for every other day. Has BM every 3-4 days. Has been taking Milk of mag along with Linzess PRN.  - Denies current vaginal HSV lesions or vaginal itching at this visit.    Chronic issues:  - History of "prediabetes" per patient and was once on metformin prior to pregnancy which was discontinued by her PCP and insulin during her first pregnancy. No longer taking any hyperglycemic medications.   - ADHD on Adderall 30 mg BID. No longer taking Adderall.  - Herpes Simplex type 2, last flare was 2 months ago when she first discovered she was pregnancy. Last took antiviral medication for it 5 years ago.  - History of preeclampsia with severe features in prior pregnancy and required IV magnesium during labor and hospitalization. Will need Vatrex at 36 weeks GA.      Current medications taking during this current pregnancy: PNV daily, ASA 81 daily      Gestational History:   OB History    Para Term  AB Living   4 1 1 0 2 1   SAB IAB Ectopic Multiple Live Births   2 0 0 0 1      # Outcome Date GA Lbr Luis/2nd Weight Sex Delivery Anes PTL Lv   4 Current            3 Term 10/29/21   3.09 kg (6 lb 13 oz) M CS-Unspec EPI N ESTUARDO      Complications: Failure to Progress in Second Stage   2 SAB 2018        FD   1 SAB 2018        FD       GYNHx:   LMP: Patient's last menstrual period was 2023 (exact date).   Menarche at 15   Menstrual Hx:  began regular, but became irregular , 5 day cycles, 7 pads/day, says flow was leight,   Hx of birth control: OCP (estrogen/progesterone)   Hx of STDs: " HSV2   positive for HPV, ASCUS in 2016 History of Abnormal PAP   3/18/2024      PMHx:   Past Medical History:   Diagnosis Date    Abnormal Pap smear of cervix     Anemia     Anxiety     Depression     Diabetes mellitus     Herpes simplex virus (HSV) infection     Hypertension     Postpartum depression     Vitamin D deficiency        PSHx:   Past Surgical History:   Procedure Laterality Date     SECTION      COLONOSCOPY W/ BIOPSIES AND POLYPECTOMY  2018    DILATION AND CURETTAGE OF UTERUS  2018       Allergies:   Review of patient's allergies indicates:   Allergen Reactions    Benadryl allergy-sinus Shortness Of Breath    Diphenhydramine hcl Swelling    Diphenhyd-pe-acetaminophen        SH:   Social History     Socioeconomic History    Marital status:    Tobacco Use    Smoking status: Former     Current packs/day: 0.50     Average packs/day: 0.5 packs/day for 6.4 years (3.2 ttl pk-yrs)     Types: Cigarettes     Start date: 11/10/2017     Passive exposure: Never    Smokeless tobacco: Current   Substance and Sexual Activity    Alcohol use: Not Currently     Alcohol/week: 1.0 standard drink of alcohol     Types: 1 Glasses of wine per week     Comment: Occasionally    Drug use: Never    Sexual activity: Yes     Partners: Male     Birth control/protection: Abstinence     Social Determinants of Health     Financial Resource Strain: Medium Risk (3/10/2024)    Overall Financial Resource Strain (CARDIA)     Difficulty of Paying Living Expenses: Somewhat hard   Food Insecurity: Food Insecurity Present (3/10/2024)    Hunger Vital Sign     Worried About Running Out of Food in the Last Year: Sometimes true     Ran Out of Food in the Last Year: Never true   Transportation Needs: No Transportation Needs (3/10/2024)    PRAPARE - Transportation     Lack of Transportation (Medical): No     Lack of Transportation (Non-Medical): No   Physical Activity: Sufficiently Active (3/10/2024)    Exercise Vital Sign     Days  "of Exercise per Week: 3 days     Minutes of Exercise per Session: 60 min   Stress: No Stress Concern Present (3/10/2024)    [employer] - Occupational Stress Questionnaire     Feeling of Stress : Only a little   Social Connections: Unknown (3/10/2024)    Social Connection and Isolation Panel [NHANES]     Frequency of Communication with Friends and Family: More than three times a week     Frequency of Social Gatherings with Friends and Family: Never     Active Member of Clubs or Organizations: No     Attends Club or Organization Meetings: Never     Marital Status:    Housing Stability: Low Risk  (3/10/2024)    Housing Stability Vital Sign     Unable to Pay for Housing in the Last Year: No     Number of Places Lived in the Last Year: 2     Unstable Housing in the Last Year: No       FHx:   Family History   Problem Relation Age of Onset    Hypertension Father     Diabetes Father     Kidney disease Father     Hypertension Mother     Diabetes Mother     Heart disease Mother     Cervical cancer Mother     Kidney failure Brother     Kidney disease Brother     Autoimmune disease Sister        ROS:  Denies CP, SOB, diarrhea, fevers, dysuria, hematuria.  + Constipation      OB Review of Systems:  Fetal movements: yes, flutters  Vaginal bleeding: no  Vaginal discharge: no  Loss of fluid: no  Contractions: no  Headaches: yes; resolve with lying down   Vision changes: no  Edema: no      Objective:      Vitals:    04/09/24 0852   BP: 110/71   BP Location: Right arm   Patient Position: Sitting   BP Method: Medium (Automatic)   Pulse: 89   Resp: 18   Temp: 98.1 °F (36.7 °C)   TempSrc: Oral   SpO2: 100%   Weight: 67.6 kg (149 lb)   Height: 5' 3" (1.6 m)       Physical Exam:  General: NAD,  comfortable appearing  RESP: clear to auscultation bilaterally, non labored  CV: regular rate and rhythm, no murmurs, no edema  ABD: gravid, nontender, BS+; fundal height palpable below umbilicus   FHTs: 155 bpm " with Doppler U/S         Lab Results   Component Value Date    COLORU Yellow 04/09/2024    SPECGRAV 1.025 04/09/2024    PHUR 7.0 04/09/2024    WBCUR negative 04/09/2024    NITRITE negative 04/09/2024    PROTEINPOC negative 04/09/2024    GLUCOSEUR negative 04/09/2024    KETONESU negative 04/09/2024    UROBILINOGEN 0.2 04/09/2024    BILIRUBINPOC negative 04/09/2024    RBCUR negative 04/09/2024       Current Outpatient Medications:     dextroamphetamine-amphetamine 30 mg Tab, Take 1 tablet by mouth 2 (two) times daily., Disp: , Rfl:     LINZESS 290 mcg Cap capsule, Take 290 mcg by mouth before breakfast., Disp: , Rfl:     ALPRAZolam (XANAX) 1 MG tablet, Take 1 mg by mouth 2 (two) times daily., Disp: , Rfl:     aspirin (ECOTRIN) 81 MG EC tablet, Take 1 tablet (81 mg total) by mouth once daily., Disp: 30 tablet, Rfl: 0    blood sugar diagnostic ( BLOOD GLUCOSE TEST STRIP) Strp, 1 each by Misc.(Non-Drug; Combo Route) route 2 (two) times a day., Disp: 100 each, Rfl: 1    blood-glucose meter kit, Use as instructed, Disp: 1 each, Rfl: 0    docusate sodium (COLACE) 100 MG capsule, Take 1 capsule (100 mg total) by mouth 2 (two) times daily., Disp: 60 capsule, Rfl: 1    lancets (LANCETS,THIN) Misc, 1 each by Misc.(Non-Drug; Combo Route) route 2 (two) times a day., Disp: 100 each, Rfl: 1    PNV,calcium 72/iron/folic acid (PRENATAL VITAMIN) Tab, Take 1 tablet by mouth once daily., Disp: 30 tablet, Rfl: 2     Labs:    Initial OB Labs: Ordered on 3/12/24  - Blood Type and Rh: A positive  - CBC H/H: 13.5/39.5  - HIV: NR  - RPR: NR  - GC: Negative  - CT: Negative  - HBsAg: NR  - HCVAb: NR  - Rubella: Vaccinated  - Varicella: Vaccinated  - UA & Culture: No growth  - Sickle Cell Screen: Negative  - PAP: NIL; HR-HPV negative  - Influenza vaccine date: 03/12/2024    15-20 Weeks: Lab Ordered TBD  - Quad Screen: TBD    28 Week Lab: Ordered TBD  - 1H GTT: TBD  - Rhogam: TBD  - Date of Tdap: TBD  - CBC H/H: TBD  - RPR: TBD  - BTL  consent: TBD    36 Week Lab: Ordered TBD  - CBC H/H: TBD  - RPR: TBD  - GBS Culture: TBD  - HIV: TBD  - Cervical GC: TBD    Urine dip:  Lab Results   Component Value Date    COLORU Yellow 04/09/2024    SPECGRAV 1.025 04/09/2024    PHUR 7.0 04/09/2024    WBCUR negative 04/09/2024    NITRITE negative 04/09/2024    PROTEINPOC negative 04/09/2024    GLUCOSEUR negative 04/09/2024    KETONESU negative 04/09/2024    UROBILINOGEN 0.2 04/09/2024    BILIRUBINPOC negative 04/09/2024    RBCUR negative 04/09/2024       Imaging:  Initial OB U/S (03/12/24):  Impression:  Single viable intrauterine pregnancy with a crown-rump length indicative of an 11 week 1 day gestation.  Small complex cyst of the left ovary       20 wk anatomy Scan: To be scheduled      Assessment:       1. 15 weeks gestation of pregnancy    2. Constipation, unspecified constipation type    3. History of diabetes mellitus    4. Hx of preeclampsia, prior pregnancy, currently pregnant, second trimester    5. History of MILES positive for HSV    6. History of pre-eclampsia in prior pregnancy, currently pregnant             Plan:         15 weeks gestation of pregnancy  - OB Protocol   - PNVs daily  - Urine dip reviewed as above  - Routine (initial) labs reviewed as above; quad screen discussed with patient and she would like to have quad screen done at her next clinic visit  - Mother plans to breast and/or bottlefeed  - Postpartum contraception discussion: PENDING  - Labor precautions discussed in depth; patient given list of safe medications to take during pregnancy. Medication precautions discussed including her current outside prescriptions. DISCONTINUE use of Adderall 30 mg BID, Linzess 290 mg daily, and Xanax 1 mg BID during pregnancy.  - Will need anatomy scan scheduled at 20 weeks GA and needs quad screen at follow up visit.      Hx of preeclampsia, prior pregnancy, currently pregnant, second trimester  - START taking aspirin (ECOTRIN) 81 MG EC tablet; Take  1 tablet (81 mg total) by mouth once daily.  Dispense: 30 tablet; Refill: 0. Instructed patient to start medication at 16 weeks GA. Patient agreeable. Risks and ER precuations for preeclampsia discussed in depth with patient.      History of MILES positive for HSV        - Will need Valtrex prescribed at 36 weeks GA for HSV2 prophylaxis; patient made aware at  today's visit.        - Last outbreak was about 1-2 months ago per patient      Constipation, unspecified constipation type  - START taking docusate sodium (COLACE) 100 MG capsule; Take 1 capsule (100 mg total) by mouth 2 (two) times daily.  Dispense: 60 capsule; Refill: 1.  - DISCONTINUE using Linzess 290 mg daily and Milk of magnesia       History of diabetes mellitus  - Hemoglobin A1C performed at today's visit  - Patient instructed to record AM fasting glucose and 2 hour post prandial glucose every day until her next clinic OB visit in 1 week. Glucometer, lancets, and test strips ordered to pharmacy. Patient states she knows how to utilize glucometer as she has had to use one in the past.  - Patient will bring glucose logs to next OB appointment at clinic. Medication management to be decided as necessary at follow up.          Return to [facility] OB clinic at [facility] in 1 week for glucose log follow up and quad screen lab.      Martha Perrin DO  [facility], PGY-2

## 2024-04-10 DIAGNOSIS — Z3A.15 15 WEEKS GESTATION OF PREGNANCY: Primary | ICD-10-CM

## 2024-04-16 ENCOUNTER — OFFICE VISIT (OUTPATIENT)
Dept: FAMILY MEDICINE | Facility: CLINIC | Age: 39
End: 2024-04-16
Payer: MEDICAID

## 2024-04-16 VITALS
OXYGEN SATURATION: 100 % | TEMPERATURE: 99 F | WEIGHT: 153.19 LBS | DIASTOLIC BLOOD PRESSURE: 79 MMHG | HEIGHT: 67 IN | HEART RATE: 87 BPM | BODY MASS INDEX: 24.04 KG/M2 | RESPIRATION RATE: 20 BRPM | SYSTOLIC BLOOD PRESSURE: 113 MMHG

## 2024-04-16 DIAGNOSIS — R82.998 LEUKOCYTES IN URINE: ICD-10-CM

## 2024-04-16 DIAGNOSIS — Z3A.16 16 WEEKS GESTATION OF PREGNANCY: Primary | ICD-10-CM

## 2024-04-16 DIAGNOSIS — Z86.32 HX OF GESTATIONAL DIABETES IN PRIOR PREGNANCY, CURRENTLY PREGNANT: ICD-10-CM

## 2024-04-16 DIAGNOSIS — O09.299 HX OF GESTATIONAL DIABETES IN PRIOR PREGNANCY, CURRENTLY PREGNANT: ICD-10-CM

## 2024-04-16 LAB
APPEARANCE UR: CLEAR
BACTERIA #/AREA URNS AUTO: ABNORMAL /HPF
BILIRUB SERPL-MCNC: NORMAL MG/DL
BILIRUB UR QL STRIP.AUTO: NEGATIVE
BLOOD URINE, POC: NORMAL
CLARITY, POC UA: CLEAR
COLOR UR AUTO: COLORLESS
COLOR, POC UA: YELLOW
GLUCOSE UR QL STRIP.AUTO: NORMAL
GLUCOSE UR QL STRIP: NORMAL
HYALINE CASTS #/AREA URNS LPF: ABNORMAL /LPF
KETONES UR QL STRIP.AUTO: NEGATIVE
KETONES UR QL STRIP: NORMAL
LEUKOCYTE ESTERASE UR QL STRIP.AUTO: 25
LEUKOCYTE ESTERASE URINE, POC: NORMAL
NITRITE UR QL STRIP.AUTO: NEGATIVE
NITRITE, POC UA: NORMAL
PH UR STRIP.AUTO: 7 [PH]
PH, POC UA: 7
PROT UR QL STRIP.AUTO: NEGATIVE
PROTEIN, POC: NORMAL
RBC #/AREA URNS AUTO: ABNORMAL /HPF
RBC UR QL AUTO: NEGATIVE
SP GR UR STRIP.AUTO: 1.01 (ref 1–1.03)
SPECIFIC GRAVITY, POC UA: 1.01
SQUAMOUS #/AREA URNS LPF: ABNORMAL /HPF
UROBILINOGEN UR STRIP-ACNC: NORMAL
UROBILINOGEN, POC UA: 0.2
WBC #/AREA URNS AUTO: ABNORMAL /HPF

## 2024-04-16 PROCEDURE — 87086 URINE CULTURE/COLONY COUNT: CPT

## 2024-04-16 PROCEDURE — 99214 OFFICE O/P EST MOD 30 MIN: CPT | Mod: PBBFAC

## 2024-04-16 PROCEDURE — 81511 FTL CGEN ABNOR FOUR ANAL: CPT

## 2024-04-16 PROCEDURE — 36415 COLL VENOUS BLD VENIPUNCTURE: CPT

## 2024-04-16 PROCEDURE — 81002 URINALYSIS NONAUTO W/O SCOPE: CPT | Mod: PBBFAC

## 2024-04-16 PROCEDURE — 81001 URINALYSIS AUTO W/SCOPE: CPT

## 2024-04-16 NOTE — PROGRESS NOTES
"OB Office Visit Note    Name: Ruby Angel  MRN: [id number]  Date: 2024    Subjective:      Chief Complaint: Routine Prenatal Visit (HROB 16w0d, no complaints.)      Ruby Angel is a 39 y.o.  at 16w0d with YOMI 10/1/2024, by Ultrasound    Current issues: has no unusual complaints  - has glucose logs with her, most readings 90-120s with rare 150s readings. Not currently on antihyperglycemic medications.      Chronic issues:  - History of "prediabetes" per patient and was once on metformin prior to pregnancy which was discontinued by her PCP and insulin during her first pregnancy. No longer taking any hyperglycemic medications.   - ADHD on Adderall 30 mg BID. No longer taking Adderall.  - Herpes Simplex type 2, last flare was 2 months ago when she first discovered she was pregnancy. Last took antiviral medication for it 5 years ago. Denies current vaginal HSV lesions or vaginal itching at this visit.  - History of preeclampsia with severe features in prior pregnancy and required IV magnesium during labor and hospitalization. Will need Vatrex at 36 weeks GA.    PMHx:  see above  PSHx: previous c/s  SH: support at home  FHx: No reported pertinent FHx  Meds:   Prior to Admission medications    Medication Sig Start Date End Date Taking? Authorizing Provider   ALPRAZolam (XANAX) 1 MG tablet Take 1 mg by mouth 2 (two) times daily. 23   Provider, Historical   aspirin (ECOTRIN) 81 MG EC tablet Take 1 tablet (81 mg total) by mouth once daily. 24   Martha Perrin DO   blood sugar diagnostic ( BLOOD GLUCOSE TEST STRIP) Strp 1 each by Misc.(Non-Drug; Combo Route) route 2 (two) times a day. 24   Martha Perrin DO   blood-glucose meter kit Use as instructed 24  Martha Perrin DO   dextroamphetamine-amphetamine 30 mg Tab Take 1 tablet by mouth 2 (two) times daily. 3/12/24   Provider, Historical   docusate sodium (COLACE) 100 MG capsule Take 1 capsule (100 mg total) by mouth 2 (two) times " daily. 24   Martha Perrin DO   lancets (LANCETS,THIN) Misc 1 each by Misc.(Non-Drug; Combo Route) route 2 (two) times a day. 24   Martha Perrin DO   LINZESS 290 mcg Cap capsule Take 290 mcg by mouth before breakfast. 23   Provider, Historical   PNV,calcium 72/iron/folic acid (PRENATAL VITAMIN) Tab Take 1 tablet by mouth once daily. 24  Tj Livingston MD     Allergies:   Review of patient's allergies indicates:   Allergen Reactions    Benadryl allergy-sinus Shortness Of Breath    Diphenhydramine hcl Swelling    Diphenhyd-pe-acetaminophen        Gestational History:   OB History    Para Term  AB Living   4 1 1 0 2 1   SAB IAB Ectopic Multiple Live Births   2 0 0 0 1      # Outcome Date GA Lbr Luis/2nd Weight Sex Type Anes PTL Lv   4 Current            3 Term 10/29/21   3.09 kg (6 lb 13 oz) M CS-Unspec EPI N ESTUARDO      Complications: Failure to Progress in Second Stage   2 SAB 2018        FD   1 SAB 2018        FD       GYNHx:              LMP: Patient's last menstrual period was 2023 (exact date).              Menarche at 15              Menstrual Hx:  began regular, but became irregular , 5 day cycles, 7 pads/day, says flow was leight,   Hx of birth control: OCP (estrogen/progesterone)              Hx of STDs: HSV2              positive for HPV, ASCUS in 2016 History of Abnormal PAP   3/18/2024      Antepartum specific ROS  - Fetal movements: Yes  - Vaginal bleeding: No  - Vaginal discharge: No  - Loss of fluid: No  - Contractions: No  - Headaches: No  - Vision changes: No  - Edema: No    Review of Systems  Constitutional: no fever, no chills  CV: no chest pain  RESP: no SOB  : no dysuria, no hematuria  GI: no constipation, no diarrhea, no nausea, no vomiting  Psych: no depression, no anxiety; No SI/HI    Objective:      Vitals:    24 0758   BP: 113/79   BP Location: Left arm   Patient Position: Sitting   BP Method: Medium (Automatic)   Pulse: 87  "  Resp: 20   Temp: 98.5 °F (36.9 °C)   TempSrc: Oral   SpO2: 100%   Weight: 69.5 kg (153 lb 3.2 oz)   Height: 5' 7" (1.702 m)       Lab Results   Component Value Date    COLORU Yellow 04/16/2024    SPECGRAV 1.015 04/16/2024    PHUR 7.0 04/16/2024    WBCUR trace 04/16/2024    NITRITE neg 04/16/2024    PROTEINPOC neg 04/16/2024    GLUCOSEUR neg 04/16/2024    KETONESU neg 04/16/2024    UROBILINOGEN 0.2 04/16/2024    BILIRUBINPOC neg 04/16/2024    RBCUR neg 04/16/2024       General:   RESP: clear to auscultation bilaterally, non labored  CV: regular rate and rhythm, no murmurs, no edema  ABD: gravid, nontender, BS+ FHT present  FHTs: 142 bpm  Fundal height: palpable below level of umbilicus     Initial OB Labs: Ordered on 3/12/24  - Blood Type and Rh: A positive  - CBC H/H: 13.5/39.5  - HIV: NR  - RPR: NR  - GC: Negative  - CT: Negative  - HBsAg: NR  - HCVAb: NR  - Rubella: Vaccinated  - Varicella: Vaccinated  - UA & Culture: No growth  - Sickle Cell Screen: Negative  - PAP: NIL; HR-HPV negative  - Influenza vaccine date: 03/12/2024    15-20 Weeks: Lab Ordered 04/16/2024  - Quad Screen: pending     Imaging:  Initial U/S: 03/12/2024  Single viable intrauterine pregnancy with a crown-rump length indicative of an 11 week 1 day gestation.    Assessment/Plan:     Ruby was seen today for routine prenatal visit.    Diagnoses and all orders for this visit:    16 weeks gestation of pregnancy  -     Quad Screen Maternal, Serum; Future  -     PNVs  -     Urine dip reviewed as above  -     Routine labs: as mentioned above/pending  -     Mother plans to breast and/or bottlefeed  -     Postpartum contraception discussion: PENDING  -     ED precautions discussed in depth: vaginal bleeding or leaking fluid, belly cramping or pain, SOB/chest pain, swelling of the face/lower extremities, vision changes. If don't feel the baby move in over an hour. Severe headache that are not resolved with medication    Leukocytes in urine  -     " asymptomatic   -     will obtain Urinalysis and urine culture   -     will tx if >100,000 colonies on culture    Hx of gestational diabetes in prior pregnancy, currently pregnant  -     continue with daily glucose logs 4 times daily until next appointment  -     goal glucose readings <120         Return to clinic in Follow up in about 3 weeks (around 5/7/2024) for HROB.    Ashley Choudhary MD  [employer] , [facility]

## 2024-04-17 NOTE — PROGRESS NOTES
I have personally reviewed the review of systems (ROS) and past, family and social histories (PFSH) documented above by the resident.  I have reviewed the care furnished by the resident during the encounter, including a review of the patient's medical history, the resident's findings on physical examination, diagnosis, and the treatment plan.  I participated in the management of the patient and was immediately available throughout the encounter.   I was physically present during all key portions of the service(s) provided with the resident.  Services were furnished in a primary care center located in the outpatient department of a [facility].

## 2024-04-18 LAB — BACTERIA UR CULT: NO GROWTH

## 2024-04-19 LAB
# FETUSES: NORMAL
2ND TRIMESTER 4 SCREEN SERPL-IMP: NORMAL
AFP ADJ MOM SERPL: 0.92 MOM
AFP SERPL IA-MCNC: 37.3 NG/ML
AGE AT DELIVERY: NORMAL
B-HCG ADJ MOM SERPL: 0.62 MOM
CHORION TYPE: NORMAL
COLLECT DATE: NORMAL
CURRENT SMOKER: NORMAL
FET TS 21 RISK FROM MAT AGE: NORMAL
GA METHOD: NORMAL
GA US.COMPOSITE.EST: NORMAL WK,D
HCG SERPL IA-ACNC: 20.2 IU/ML
HX OF NTD QL: NO
HX OF NTD QL: NO
HX OF TRISOMY 21 QL: NO
IDDM PATIENT QL: NO
INHIBIN A ADJ MOM SERPL: 0.81 MOM
INHIBIN SERPL-MCNC: 155 PG/ML
IVF PREGNANCY: NO
LABORATORY COMMENT REPORT: NORMAL
M PHYSICIAN PHONE NUMBER: NORMAL
MATERNAL RISK FACTORS: NORMAL
NEURAL TUBE DEFECT RISK FETUS: NORMAL %
RECOM F/U: NORMAL
TEST PERFORMANCE INFO SPEC: NORMAL
TS 18 RISK FETUS: NORMAL
TS 21 RISK FETUS: NORMAL
U ESTRIOL ADJ MOM SERPL: 1.11 MOM
U ESTRIOL SERPL-MCNC: 0.99 NG/ML

## 2024-04-22 NOTE — PROGRESS NOTES
I discussed the case with the resident. The chart was reviewed. I agree with the assessment and plan. Care provided was reasonable and necessary. Discussed case with resident and Dr. Verduzco, OB staff. Will RTC to high ob clinic with diabetes log for evaluation in 1 week.

## 2024-04-29 ENCOUNTER — PATIENT MESSAGE (OUTPATIENT)
Dept: PEDIATRIC CARDIOLOGY | Facility: CLINIC | Age: 39
End: 2024-04-29
Payer: MEDICAID

## 2024-04-29 ENCOUNTER — PROCEDURE VISIT (OUTPATIENT)
Dept: MATERNAL FETAL MEDICINE | Facility: CLINIC | Age: 39
End: 2024-04-29
Payer: MEDICAID

## 2024-04-29 ENCOUNTER — OFFICE VISIT (OUTPATIENT)
Dept: MATERNAL FETAL MEDICINE | Facility: CLINIC | Age: 39
End: 2024-04-29
Payer: MEDICAID

## 2024-04-29 VITALS
BODY MASS INDEX: 24.33 KG/M2 | WEIGHT: 155 LBS | HEIGHT: 67 IN | SYSTOLIC BLOOD PRESSURE: 114 MMHG | DIASTOLIC BLOOD PRESSURE: 65 MMHG | HEART RATE: 86 BPM

## 2024-04-29 DIAGNOSIS — O10.919 CHRONIC HYPERTENSION AFFECTING PREGNANCY: ICD-10-CM

## 2024-04-29 DIAGNOSIS — O24.912 DIABETES MELLITUS AFFECTING PREGNANCY IN SECOND TRIMESTER: ICD-10-CM

## 2024-04-29 DIAGNOSIS — O99.342 MENTAL DISORDER AFFECTING PREGNANCY IN SECOND TRIMESTER: ICD-10-CM

## 2024-04-29 DIAGNOSIS — O98.512 HERPES VIRUS INFECTION IN MOTHER DURING SECOND TRIMESTER OF PREGNANCY: ICD-10-CM

## 2024-04-29 DIAGNOSIS — Z3A.15 15 WEEKS GESTATION OF PREGNANCY: ICD-10-CM

## 2024-04-29 DIAGNOSIS — O09.522 MULTIGRAVIDA OF ADVANCED MATERNAL AGE IN SECOND TRIMESTER: Primary | ICD-10-CM

## 2024-04-29 DIAGNOSIS — B00.9 HERPES VIRUS INFECTION IN MOTHER DURING SECOND TRIMESTER OF PREGNANCY: ICD-10-CM

## 2024-04-29 DIAGNOSIS — E04.2 MULTINODULAR GOITER: ICD-10-CM

## 2024-04-29 PROBLEM — N93.9 ABNORMAL VAGINAL BLEEDING: Status: RESOLVED | Noted: 2022-08-02 | Resolved: 2024-04-29

## 2024-04-29 PROCEDURE — 99204 OFFICE O/P NEW MOD 45 MIN: CPT | Mod: TH,S$GLB,, | Performed by: OBSTETRICS & GYNECOLOGY

## 2024-04-29 PROCEDURE — 3044F HG A1C LEVEL LT 7.0%: CPT | Mod: CPTII,S$GLB,, | Performed by: OBSTETRICS & GYNECOLOGY

## 2024-04-29 PROCEDURE — 3008F BODY MASS INDEX DOCD: CPT | Mod: CPTII,S$GLB,, | Performed by: OBSTETRICS & GYNECOLOGY

## 2024-04-29 PROCEDURE — 1159F MED LIST DOCD IN RCRD: CPT | Mod: CPTII,S$GLB,, | Performed by: OBSTETRICS & GYNECOLOGY

## 2024-04-29 PROCEDURE — 1160F RVW MEDS BY RX/DR IN RCRD: CPT | Mod: CPTII,S$GLB,, | Performed by: OBSTETRICS & GYNECOLOGY

## 2024-04-29 PROCEDURE — 3078F DIAST BP <80 MM HG: CPT | Mod: CPTII,S$GLB,, | Performed by: OBSTETRICS & GYNECOLOGY

## 2024-04-29 PROCEDURE — 76805 OB US >/= 14 WKS SNGL FETUS: CPT | Mod: S$GLB,,, | Performed by: OBSTETRICS & GYNECOLOGY

## 2024-04-29 PROCEDURE — 3074F SYST BP LT 130 MM HG: CPT | Mod: CPTII,S$GLB,, | Performed by: OBSTETRICS & GYNECOLOGY

## 2024-04-29 RX ORDER — LANCETS 33 GAUGE
EACH MISCELLANEOUS 2 TIMES DAILY
COMMUNITY
Start: 2024-04-09

## 2024-04-29 RX ORDER — BLOOD-GLUCOSE METER
EACH MISCELLANEOUS
COMMUNITY
Start: 2024-04-09

## 2024-04-29 NOTE — PROGRESS NOTES
MATERNAL-FETAL MEDICINE   CONSULT NOTE    Provider requesting consultation: [facility]    SUBJECTIVE:     Ms. Ruby Angel is a 39 y.o.  female with IUP at 17w6d who is seen in consultation by MFCHITO for evaluation and management of:  Problem   - Multigravida of advanced maternal age in second trimester   - Diabetes affecting pregnancy in second trimester   - Herpes virus infection in mother during second trimester of pregnancy   - Multinodular goiter   - Anxiety, depression, ADHD, hx PPD affecting pregnancy in second trimester   - Chronic hypertension affecting pregnancy   Abnormal Vaginal Bleeding (Resolved)     Ruby is being referred for the age in which she will deliver. She completed quad screen with low risk results.  She has a history of prediabetes, PCOS, and early onset GDM requiring insulin in her prior pregnancy. She reports following with her PCP routinely and testing between pregnancies was negative for Type 2 DM. A1C at the beginning of her current pregnancy: 5.3. She has not completed early 1h glucola, however, has been checking her sugars at home. She forgot her log at home. She reports her fastings are all <95 and 2h PP values mostly 120s. She will submit her log for review via portal/email.  She has a history of cHTN that was diagnosed approx 5 years ago. She was on antihypertensives for one year then was able to be weaned off and has not been on medication since. She had pre-eclampsia with her prior pregnancy and delivered at 39w. She is taking a low dose aspirin daily. She has completed a baseline CMP. She has not completed baseline urine protein testing.  She has a history of anxiety, depression, ADHD, and postpartum depression. She stopped Xanax and Adderall at 15w. She is not on any medications currently.  She has a history of HSV. Last outbreak 2024.       Medication List with Changes/Refills   Current Medications    ASPIRIN (ECOTRIN) 81 MG EC TABLET    Take 1 tablet (81 mg total) by  mouth once daily.    BLOOD SUGAR DIAGNOSTIC ( BLOOD GLUCOSE TEST STRIP) STRP    1 each by Misc.(Non-Drug; Combo Route) route 2 (two) times a day.    LANCETS 33 GAUGE MISC    Apply topically 2 (two) times daily.    PNV,CALCIUM 72/IRON/FOLIC ACID (PRENATAL VITAMIN) TAB    Take 1 tablet by mouth once daily.    TRUE METRIX GLUCOSE METER MISC    USE AS INSTRUCTED   Discontinued Medications    ALPRAZOLAM (XANAX) 1 MG TABLET    Take 1 mg by mouth 2 (two) times daily.    BLOOD-GLUCOSE METER KIT    Use as instructed    DEXTROAMPHETAMINE-AMPHETAMINE 30 MG TAB    Take 1 tablet by mouth 2 (two) times daily.    DOCUSATE SODIUM (COLACE) 100 MG CAPSULE    Take 1 capsule (100 mg total) by mouth 2 (two) times daily.    LANCETS (LANCETS,THIN) MISC    1 each by Misc.(Non-Drug; Combo Route) route 2 (two) times a day.    LINZESS 290 MCG CAP CAPSULE    Take 290 mcg by mouth before breakfast.       Review of patient's allergies indicates:   Allergen Reactions    Benadryl allergy-sinus Shortness Of Breath    Diphenhydramine hcl Swelling    Diphenhyd-pe-acetaminophen        PMH:  Past Medical History:   Diagnosis Date    Abnormal Pap smear of cervix     Anemia     Anxiety     Depression     Diabetes mellitus     Herpes simplex virus (HSV) infection     Hypertension     Postpartum depression     Vitamin D deficiency        PObHx:  OB History    Para Term  AB Living   4 1 1   2 1   SAB IAB Ectopic Multiple Live Births   2       1      # Outcome Date GA Lbr Luis/2nd Weight Sex Type Anes PTL Lv   4 Current            3 Term 10/29/21 39w4d  3.09 kg (6 lb 13 oz) M CS-Unspec EPI N ESTUARDO      Complications: Failure to Progress in Second Stage   2 SAB 2018 10w0d    SAB         Birth Comments: D&C   1 2018 5w0d    SAB          PSH:  Past Surgical History:   Procedure Laterality Date     SECTION      COLONOSCOPY W/ BIOPSIES AND POLYPECTOMY  2018    DILATION AND CURETTAGE OF UTERUS  2018       Family  "history:family history includes Autoimmune disease in her sister; Cervical cancer in her mother; Diabetes in her father and mother; Heart disease in her mother; Hypertension in her father and mother; Kidney disease in her brother and father; Kidney failure in her brother.    Social history: reports that she has quit smoking. Her smoking use included cigarettes. She started smoking about 6 years ago. She has a 3.2 pack-year smoking history. She has never been exposed to tobacco smoke. She has never used smokeless tobacco. She reports that she does not currently use alcohol after a past usage of about 1.0 standard drink of alcohol per week. She reports that she does not use drugs.    Genetic history: The patient denies any inherited genetic diseases or birth defects in herself or her partner's personal history or family.    Objective:   /65 (BP Location: Right arm)   Pulse 86   Ht 5' 7" (1.702 m)   Wt 70.3 kg (154 lb 15.7 oz)   LMP 2023 (Exact Date)   BMI 24.27 kg/m²     Ultrasound performed. See viewpoint for full ultrasound report.    A corral living IUP is identified, and the biometry is appropriate for established gestational age.    Early, limited anatomy appears normal. The placenta is posterior.   Transvaginal cervical length was 4 cm.    ASSESSMENT/PLAN:     39 y.o.  female with IUP at 17w6d     - Multigravida of advanced maternal age in second trimester  Today I counseled the patient on the relationship between maternal age and fetal aneuploidy.  We discussed the risks and benefits of screening tests versus definitive genetic testing (amniocentesis). She was counseled about her specific age-related risk of fetal aneuploidy. We discussed the limitations of ultrasound in the definitive diagnosis of fetal aneuploidy.  I quoted the patient a 1 in 900 procedure related risk of fetal loss with genetic amniocentesis.  We also discussed cell free fetal DNA screening including the " sensitivity and specificity of the test.  Her questions were answered and after today's consultation she did not want to pursue amniocentesis.  She did not want to pursue NIPT. She completed a quad screen with low risk results.  Additionally, we discussed the association between advanced maternal age (AMA) and preeclampsia, gestational diabetes, and fetal growth restriction.    Recommendations:  Detailed anatomic survey at 19-20 weeks (scheduled w/ [facility])        - Chronic hypertension affecting pregnancy  She was initially diagnosed with cHTN approx 5 years ago and was on antihypertensives for a year. She reports she was able to be weaned off of meds after that time. Denies any problems with Bps since then. Of note, she had pre-eclampsia with her prior pregnancy.  Today I counseled the patient on maternal/fetal risks associated with CHTN during pregnancy. Risks include but not limited to fetal growth restriction, miscarriage, abruption, maternal end organ disease (renal failure, MI, and stroke),  delivery, development of superimposed preeclampsia, and eclampsia. She was counseled on the recommendations for blood pressure control, serial ultrasound for fetal growth assessment and  testing, and timing of delivery. I also counseled her on the recommendation for aspirin 81 mg daily which may decrease her risk of developing superimposed preeclampsia.     Recommendations (Please refer to [facility] guidelines):  -Continue aspirin 81 mg daily for preeclampsia risk reduction  -No current medications  -Baseline evaluation with primary OB:   baseline P/C ratio, CMP, and CBC.  Maternal EKG  Maternal ophthalmic evaluation  Maternal echocardiogram if HTN has been long-standing or EKG is abnormal  -Serial fetal growth ultrasounds every 4-6 weeks, beginning at 26-28 weeks.   -Continued close observation of patient's blood pressures. Avoid hypotension as this has been associated with uteroplacental  insufficiency.  -In conjunction with the CHAP study recommendation for BP control: If BP is persistently ?140/90 antihypertensive medication is recommended with goal BP of 120-140/80-90.  -Antepartum testing and delivery timing discussed in DM section.          - Multinodular goiter  She has a history of goiter. She denies abnormalities with TFTs and has never had to be on medication. Last time TFTs were evaluated were in 2023.    Recommendations  - TFTs. If normal, no further testing.     - Diabetes affecting pregnancy in second trimester  She has a history of pre-diabetes, PCOS, and early onset GDM requiring insulin in her prior pregnancy. Most recent A1C at the beginning of pregnancy 5.3. She has not completed an early glucola, however, has been checking her sugars. Postprandial values out of range, therefore, counseling is as follows:    The patient was counseled regarding the risks of diabetes in pregnancy. These risks include but are not limited to an increased risk of , preeclampsia/gestational hypertension, fetal structural anomalies, macrosomia, prematurity, shoulder dystocia/birth injury,  hyperbilirubinemia and electrolyte issues, pulmonary immaturity and sudden stillbirth especially in those patients with poor glucose control. I also discussed with the patient the need for increased fetal surveillance with serial fetal growth assessments and third trimester fetal testing. I also reviewed the possibility of need for early delivery in those with poor glucose control or evidence of fetal growth abnormalities.    She forgot her log at home. She reports fasting usually run 70s-90s, (94 - highest). Postprandials usually run 120s (150 highest). She does endorse elevated values to dietary selections. Recommend dietary compliance. Will submit her log weekly to our office for review/mgmt.    Recommendations:  Baseline evaluation (to be ordered by primary OB provider):  Urine P/C ratio,  CBC, CMP  Maternal EKG; echocardiogram if BMI > 30 or EKG is abnormal  Maternal ophthalmic exam (if hasn't been performed in last year) and podiatry exam  Hemoglobin A1C every trimester (Baseline-5.3)  Diabetic education referral and counseling re: goal blood sugars (< 95 mg/dl for fasting, < 120 mg/dl for 2 hour postprandial)  Medications:   Continue low dose aspirin for preeclampsia risk reduction  1 mg folic acid daily  Regimen: Diet   She will submit her log to our office on a weekly basis via email or portal for review and management   Ultrasounds with [facility]:  Targeted anatomy survey at 20 weeks (scheduled with M)  Serial growth ultrasounds q 4-6 weeks at 26-28 weeks (scheduled with [facility])    A fetal echocardiogram is recommended at 22-24 weeks with pediatric cardiology (M will arrange)    Initiate  surveillance at 32 weeks:   Weekly BPP or NST + AFV if good control.   If control is poor, twice weekly  fetal surveillance is recommended with BPP alternating with NST   Delivery timin 0/7 to 38 and 6/7 weeks if under good control without comorbidities  37 0/7 to 37 and 67 weeks if longstanding diabetes or poorly controlled, polyhydramnios, EFW>90th percentile, or BMI >= 40  36 0/7 to 37 and 6/7 weeks if vascular complications, prior stillbirth or other complicating conditions.  Recommend consideration of earlier delivery if IUGR, HTN, or other complications  Recommend offering  for delivery is EFW is 4500g or more near the time of delivery    Insulin management during labor and delivery (if needed)  -IF AM ADMIT: Give usual dose of intermediate acting (NPH) or long-acting insulin at bedtime the night before admit  -Hold morning dose of insulin or reduce to ½ dose   -Patients who require insulin should be scheduled as the first available (7 am or 7:30 am)  given the need for NPO status  -Check glucose every 2 hours in latent labor; hourly in active labor  -If blood glucose  is less than 70 mg/dl, change IVF to D5 ½ NS at rate of 100-150 cc/hr and monitor blood glucose hourly   -IV infusion of regular insulin is recommended if glucose levels exceed 120 mg/dl    Postpartum management  -Insulin should be reduced by 1/2 of the pre-delivery dose within the first 6-24 hours following delivery.  -Patients who were well-controlled on oral regimens can often return to these following delivery.  -Patients who are breastfeeding should be advised to have small snacks before breastfeeding to reduce the risk of hypoglycemia.  -Patients should be referred to primary MD or Endocrinology for postpartum management.    The patient was advised to call for blood sugars less than 70 or greater than 200 prior to her next appointment. She was also advised that if she notes nausea/vomiting, inability to tolerate PO, or concerns about being able to take her insulin that she should contact her provider or present to the OB ED.      - Anxiety, depression, ADHD, hx PPD affecting pregnancy in second trimester  She reports a history of anxiety, depression, ADHD, and hx PPD. She stopped Xanax and Adderall at 15w. She reports stable symptoms. Discussed increased risk for peripartum and postpartum mood disorders. Precautions provided.      We have discussed the importance of optimization of mental health conditions during pregnancy in an effort to reduce the risk of postpartum mood disorders. We have discussed options for management including psychotherapy, counseling, cognitive behavioral therapy, exercise/yoga, journaling, and psychiatry services. She will notify our office if she is interested in being referred for any of the above.      - Herpes virus infection in mother during second trimester of pregnancy  Maternal-fetal transmission of HSV is the major consequence of maternal HSV infection. The most common mode of transmission is via direct contact of the fetus with infected vaginal secretions during delivery.   Intrauterine transmission from transplacental or ascending infection also occurs rarely as evidenced by the presence of early  infection despite delivery by  delivery before both labor and rupture of fetal membranes.    Recommendations:  -Start daily suppressive therapy at 36 weeks: Acyclovir 400mg po TID or Valacyclovir 500mg po BID        FOLLOW UP:   F/u in 4 weeks for US/MFM visit    This consultation was completed with the assistance of Crystal Saldivar NP.      Yulisa Melissa MD  Maternal Fetal Medicine

## 2024-04-29 NOTE — ASSESSMENT & PLAN NOTE
Maternal-fetal transmission of HSV is the major consequence of maternal HSV infection. The most common mode of transmission is via direct contact of the fetus with infected vaginal secretions during delivery.  Intrauterine transmission from transplacental or ascending infection also occurs rarely as evidenced by the presence of early  infection despite delivery by  delivery before both labor and rupture of fetal membranes.    Recommendations:  -Start daily suppressive therapy at 36 weeks: Acyclovir 400mg po TID or Valacyclovir 500mg po BID

## 2024-04-29 NOTE — ASSESSMENT & PLAN NOTE
She reports a history of anxiety, depression, ADHD, and hx PPD. She stopped Xanax and Adderall at 15w. She reports stable symptoms. Discussed increased risk for peripartum and postpartum mood disorders. Precautions provided.      We have discussed the importance of optimization of mental health conditions during pregnancy in an effort to reduce the risk of postpartum mood disorders. We have discussed options for management including psychotherapy, counseling, cognitive behavioral therapy, exercise/yoga, journaling, and psychiatry services. She will notify our office if she is interested in being referred for any of the above.

## 2024-04-29 NOTE — ASSESSMENT & PLAN NOTE
She has a history of pre-diabetes, PCOS, and early onset GDM requiring insulin in her prior pregnancy. Most recent A1C at the beginning of pregnancy 5.3. She has not completed an early glucola, however, has been checking her sugars. Postprandial values out of range, therefore, counseling is as follows:    The patient was counseled regarding the risks of diabetes in pregnancy. These risks include but are not limited to an increased risk of , preeclampsia/gestational hypertension, fetal structural anomalies, macrosomia, prematurity, shoulder dystocia/birth injury,  hyperbilirubinemia and electrolyte issues, pulmonary immaturity and sudden stillbirth especially in those patients with poor glucose control. I also discussed with the patient the need for increased fetal surveillance with serial fetal growth assessments and third trimester fetal testing. I also reviewed the possibility of need for early delivery in those with poor glucose control or evidence of fetal growth abnormalities.    She forgot her log at home. She reports fasting usually run 70s-90s, (94 - highest). Postprandials usually run 120s (150 highest). She does endorse elevated values to dietary selections. Recommend dietary compliance. Will submit her log weekly to our office for review/mgmt.    Recommendations:  Baseline evaluation (to be ordered by primary OB provider):  Urine P/C ratio, CBC, CMP  Maternal EKG; echocardiogram if BMI > 30 or EKG is abnormal  Maternal ophthalmic exam (if hasn't been performed in last year) and podiatry exam  Hemoglobin A1C every trimester (Baseline-5.3)  Diabetic education referral and counseling re: goal blood sugars (< 95 mg/dl for fasting, < 120 mg/dl for 2 hour postprandial)  Medications:   Continue low dose aspirin for preeclampsia risk reduction  1 mg folic acid daily  Regimen: Diet   She will submit her log to our office on a weekly basis via email or portal for review and management   Ultrasounds  with [facility]:  Targeted anatomy survey at 20 weeks (scheduled with [facility])  Serial growth ultrasounds q 4-6 weeks at 26-28 weeks (scheduled with [facility])    A fetal echocardiogram is recommended at 22-24 weeks with pediatric cardiology ([facility] will arrange)    Initiate  surveillance at 32 weeks:   Weekly BPP or NST + AFV if good control.   If control is poor, twice weekly  fetal surveillance is recommended with BPP alternating with NST   Delivery timin 0/7 to 38 and 6/7 weeks if under good control without comorbidities  37 0/7 to 37 and 67 weeks if longstanding diabetes or poorly controlled, polyhydramnios, EFW>90th percentile, or BMI >= 40  36 0/7 to 37 and 6/7 weeks if vascular complications, prior stillbirth or other complicating conditions.  Recommend consideration of earlier delivery if IUGR, HTN, or other complications  Recommend offering  for delivery is EFW is 4500g or more near the time of delivery    Insulin management during labor and delivery (if needed)  -IF AM ADMIT: Give usual dose of intermediate acting (NPH) or long-acting insulin at bedtime the night before admit  -Hold morning dose of insulin or reduce to ½ dose   -Patients who require insulin should be scheduled as the first available (7 am or 7:30 am)  given the need for NPO status  -Check glucose every 2 hours in latent labor; hourly in active labor  -If blood glucose is less than 70 mg/dl, change IVF to D5 ½ NS at rate of 100-150 cc/hr and monitor blood glucose hourly   -IV infusion of regular insulin is recommended if glucose levels exceed 120 mg/dl    Postpartum management  -Insulin should be reduced by 1/2 of the pre-delivery dose within the first 6-24 hours following delivery.  -Patients who were well-controlled on oral regimens can often return to these following delivery.  -Patients who are breastfeeding should be advised to have small snacks before breastfeeding to reduce the risk of hypoglycemia.  -Patients  should be referred to primary MD or Endocrinology for postpartum management.    The patient was advised to call for blood sugars less than 70 or greater than 200 prior to her next appointment. She was also advised that if she notes nausea/vomiting, inability to tolerate PO, or concerns about being able to take her insulin that she should contact her provider or present to the OB ED.

## 2024-04-29 NOTE — ASSESSMENT & PLAN NOTE
She has a history of goiter. She denies abnormalities with TFTs and has never had to be on medication. Last time TFTs were evaluated were in September of 2023.    Recommendations  - TFTs. If normal, no further testing.

## 2024-04-29 NOTE — ASSESSMENT & PLAN NOTE
Today I counseled the patient on the relationship between maternal age and fetal aneuploidy.  We discussed the risks and benefits of screening tests versus definitive genetic testing (amniocentesis). She was counseled about her specific age-related risk of fetal aneuploidy. We discussed the limitations of ultrasound in the definitive diagnosis of fetal aneuploidy.  I quoted the patient a 1 in 900 procedure related risk of fetal loss with genetic amniocentesis.  We also discussed cell free fetal DNA screening including the sensitivity and specificity of the test.  Her questions were answered and after today's consultation she did not want to pursue amniocentesis.  She did not want to pursue NIPT. She completed a quad screen with low risk results.  Additionally, we discussed the association between advanced maternal age (AMA) and preeclampsia, gestational diabetes, and fetal growth restriction.    Recommendations:  Detailed anatomic survey at 19-20 weeks (scheduled w/ MFM)

## 2024-04-29 NOTE — ASSESSMENT & PLAN NOTE
She was initially diagnosed with cHTN approx 5 years ago and was on antihypertensives for a year. She reports she was able to be weaned off of meds after that time. Denies any problems with Bps since then. Of note, she had pre-eclampsia with her prior pregnancy.  Today I counseled the patient on maternal/fetal risks associated with CHTN during pregnancy. Risks include but not limited to fetal growth restriction, miscarriage, abruption, maternal end organ disease (renal failure, MI, and stroke),  delivery, development of superimposed preeclampsia, and eclampsia. She was counseled on the recommendations for blood pressure control, serial ultrasound for fetal growth assessment and  testing, and timing of delivery. I also counseled her on the recommendation for aspirin 81 mg daily which may decrease her risk of developing superimposed preeclampsia.     Recommendations (Please refer to [facility] guidelines):  -Continue aspirin 81 mg daily for preeclampsia risk reduction  -No current medications  -Baseline evaluation with primary OB:   baseline P/C ratio, CMP, and CBC.  Maternal EKG  Maternal ophthalmic evaluation  Maternal echocardiogram if HTN has been long-standing or EKG is abnormal  -Serial fetal growth ultrasounds every 4-6 weeks, beginning at 26-28 weeks.   -Continued close observation of patient's blood pressures. Avoid hypotension as this has been associated with uteroplacental insufficiency.  -In conjunction with the CHAP study recommendation for BP control: If BP is persistently ?140/90 antihypertensive medication is recommended with goal BP of 120-140/80-90.  -Antepartum testing and delivery timing discussed in DM section.

## 2024-05-06 DIAGNOSIS — O09.292 HX OF PREECLAMPSIA, PRIOR PREGNANCY, CURRENTLY PREGNANT, SECOND TRIMESTER: ICD-10-CM

## 2024-05-06 RX ORDER — ASPIRIN 81 MG/1
81 TABLET ORAL DAILY
Qty: 30 TABLET | Refills: 0 | OUTPATIENT
Start: 2024-05-06

## 2024-05-06 NOTE — TELEPHONE ENCOUNTER
Patient notified via phone that ASA 81 mg was denied DT pregnancy and ask to contact her OB provider for further instructions. Patient verbalized understanding.

## 2024-05-07 ENCOUNTER — OFFICE VISIT (OUTPATIENT)
Dept: FAMILY MEDICINE | Facility: CLINIC | Age: 39
End: 2024-05-07
Payer: MEDICAID

## 2024-05-07 ENCOUNTER — TELEPHONE (OUTPATIENT)
Dept: MATERNAL FETAL MEDICINE | Facility: CLINIC | Age: 39
End: 2024-05-07
Payer: MEDICAID

## 2024-05-07 VITALS
OXYGEN SATURATION: 98 % | WEIGHT: 156.38 LBS | RESPIRATION RATE: 20 BRPM | DIASTOLIC BLOOD PRESSURE: 69 MMHG | HEIGHT: 67 IN | TEMPERATURE: 99 F | HEART RATE: 81 BPM | SYSTOLIC BLOOD PRESSURE: 112 MMHG | BODY MASS INDEX: 24.54 KG/M2

## 2024-05-07 DIAGNOSIS — Z3A.19 19 WEEKS GESTATION OF PREGNANCY: Primary | ICD-10-CM

## 2024-05-07 DIAGNOSIS — O24.419 GESTATIONAL DIABETES MELLITUS (GDM) IN SECOND TRIMESTER, GESTATIONAL DIABETES METHOD OF CONTROL UNSPECIFIED: ICD-10-CM

## 2024-05-07 DIAGNOSIS — O10.919 CHRONIC HYPERTENSION AFFECTING PREGNANCY: ICD-10-CM

## 2024-05-07 DIAGNOSIS — E04.2 MULTINODULAR GOITER: ICD-10-CM

## 2024-05-07 DIAGNOSIS — O98.512 HERPES VIRUS INFECTION IN MOTHER DURING SECOND TRIMESTER OF PREGNANCY: ICD-10-CM

## 2024-05-07 DIAGNOSIS — E28.2 PCOS (POLYCYSTIC OVARIAN SYNDROME): ICD-10-CM

## 2024-05-07 DIAGNOSIS — O24.415 GESTATIONAL DIABETES MELLITUS (GDM) IN SECOND TRIMESTER CONTROLLED ON ORAL HYPOGLYCEMIC DRUG: Primary | ICD-10-CM

## 2024-05-07 DIAGNOSIS — O09.522 MULTIGRAVIDA OF ADVANCED MATERNAL AGE IN SECOND TRIMESTER: ICD-10-CM

## 2024-05-07 DIAGNOSIS — O09.299 HISTORY OF PRE-ECLAMPSIA IN PRIOR PREGNANCY, CURRENTLY PREGNANT: ICD-10-CM

## 2024-05-07 DIAGNOSIS — B00.9 HERPES VIRUS INFECTION IN MOTHER DURING SECOND TRIMESTER OF PREGNANCY: ICD-10-CM

## 2024-05-07 DIAGNOSIS — O99.342 MENTAL DISORDER AFFECTING PREGNANCY IN SECOND TRIMESTER: ICD-10-CM

## 2024-05-07 LAB
BILIRUB SERPL-MCNC: NORMAL MG/DL
BLOOD URINE, POC: NORMAL
CLARITY, POC UA: CLEAR
COLOR, POC UA: YELLOW
CREAT UR-MCNC: 105.9 MG/DL (ref 45–106)
GLUCOSE UR QL STRIP: NORMAL
KETONES UR QL STRIP: NORMAL
LEUKOCYTE ESTERASE URINE, POC: NORMAL
NITRITE, POC UA: NORMAL
PH, POC UA: 7
PROT UR STRIP-MCNC: <6.8 MG/DL
PROTEIN, POC: NORMAL
SPECIFIC GRAVITY, POC UA: 1.02
TSH SERPL-ACNC: 0.74 UIU/ML (ref 0.35–4.94)
UROBILINOGEN, POC UA: 0.2

## 2024-05-07 PROCEDURE — 82570 ASSAY OF URINE CREATININE: CPT

## 2024-05-07 PROCEDURE — 81002 URINALYSIS NONAUTO W/O SCOPE: CPT | Mod: PBBFAC

## 2024-05-07 PROCEDURE — 36415 COLL VENOUS BLD VENIPUNCTURE: CPT

## 2024-05-07 PROCEDURE — 84443 ASSAY THYROID STIM HORMONE: CPT

## 2024-05-07 PROCEDURE — 99213 OFFICE O/P EST LOW 20 MIN: CPT | Mod: PBBFAC

## 2024-05-07 RX ORDER — METFORMIN HYDROCHLORIDE 500 MG/1
500 TABLET ORAL
Qty: 30 TABLET | Refills: 4 | Status: SHIPPED | OUTPATIENT
Start: 2024-05-07 | End: 2024-05-20 | Stop reason: SDUPTHER

## 2024-05-07 NOTE — TELEPHONE ENCOUNTER
I called spoke with pt regarding her new rec. Per [place] regarding her glucose log, we will be starting pt on Metformin 500 mg QAM, pt verbalized understanding, she will send another log in 1 week.

## 2024-05-07 NOTE — PROGRESS NOTES
"Subjective     Patient ID: Ruby Angel is a 39 y.o. female.    Chief Complaint:  Routine Prenatal Visit ([facility] 19w0d, no complaints.)      History of Present Illness  Ruby Angel is a 39 y.o.  at 19w0d with YOMI 10/1/2024, by 11 week ultrasound that presents to [facility] clinic.     Current issues: No unusual complaints. Needs refills of test strips. Lost glucose logs. Had one elevated post prandial 160 after eating "bad". Usually 120s post prandial. Fasting ranges from 70s-90s. Plans to submit new log to [facility]. BP ranges from 110s/70s.       Chronic issues  -AMA  -DM  -Multinodular goiter  -HTN, chronic  -History of prediabetes/PCOS  - ADHD  - Herpes Simplex  - Previous C section  - History of preeclampsia with severe features in prior pregnancy and required IV magnesium during labor and hospitalization.     GYN & OB History  Patient's last menstrual period was 2023 (exact date).   Date of Last Pap: 3/18/2024    OB History    Para Term  AB Living   4 1 1   2 1   SAB IAB Ectopic Multiple Live Births   2       1      # Outcome Date GA Lbr Luis/2nd Weight Sex Type Anes PTL Lv   4 Current            3 Term 10/29/21 39w4d  3.09 kg (6 lb 13 oz) M CS-Unspec EPI N ESTUARDO      Complications: Failure to Progress in Second Stage   2 2018 10w0d    SAB         Birth Comments: D&C   1 2018 5w0d    SAB          Review of Systems  - Fetal movements: Yes  - Vaginal bleeding: No  - Vaginal discharge: No  - Loss of fluid: No  - Contractions: No  - Headaches: No  - Vision changes: No  - Edema: No        Objective   Physical Exam      Vitals:    24 0804   BP: 112/69   Pulse: 81   Resp: 20   Temp: 98.5 °F (36.9 °C)   NAD, AXO, pleasant  Unlabored breathing  No edema  . Fundal heights 20cm       Assessment and Plan     1. 19 weeks gestation of pregnancy    2. - Anxiety, depression, ADHD, hx PPD affecting pregnancy in second trimester    3. - Chronic hypertension affecting pregnancy    4. " - Herpes virus infection in mother during second trimester of pregnancy    5. - Multinodular goiter    6. PCOS (polycystic ovarian syndrome)    7. History of pre-eclampsia in prior pregnancy, currently pregnant    8. - Multigravida of advanced maternal age in second trimester    9. Gestational diabetes mellitus (GDM) in second trimester, gestational diabetes method of control unspecified            Plan:  OB Protocol  PNV, ASA  TSH today  Urine dip pending  Urine protein creatine ratio ordered  Routine labs reviewed  Continue BP and glucose logs; submits weekly to [facility]  Refilled testing supplies  Fetal echocardiogram scheduled  Mood is stable  Will need suppressive therapy 36 weeks

## 2024-05-08 DIAGNOSIS — O09.292 HX OF PREECLAMPSIA, PRIOR PREGNANCY, CURRENTLY PREGNANT, SECOND TRIMESTER: ICD-10-CM

## 2024-05-13 ENCOUNTER — TELEPHONE (OUTPATIENT)
Dept: MATERNAL FETAL MEDICINE | Facility: CLINIC | Age: 39
End: 2024-05-13
Payer: MEDICAID

## 2024-05-13 NOTE — TELEPHONE ENCOUNTER
I called and spoke with pt, regarding her glucose log, Per [place] we will increase her Metformin from 500 mg to 1000 mg QAM, also explained to pt she is wanting pt to do a food diary, I explained to pt how to do this, send log and food diary in 1wk, pt verbalized understanding.

## 2024-05-20 DIAGNOSIS — O24.415 GESTATIONAL DIABETES MELLITUS (GDM) IN SECOND TRIMESTER CONTROLLED ON ORAL HYPOGLYCEMIC DRUG: ICD-10-CM

## 2024-05-20 RX ORDER — METFORMIN HYDROCHLORIDE 1000 MG/1
1500 TABLET ORAL
Qty: 45 TABLET | Refills: 3 | Status: SHIPPED | OUTPATIENT
Start: 2024-05-20

## 2024-05-21 DIAGNOSIS — O10.919 CHRONIC HYPERTENSION AFFECTING PREGNANCY: ICD-10-CM

## 2024-05-21 DIAGNOSIS — O24.415 GESTATIONAL DIABETES MELLITUS (GDM) IN SECOND TRIMESTER CONTROLLED ON ORAL HYPOGLYCEMIC DRUG: Primary | ICD-10-CM

## 2024-05-21 DIAGNOSIS — O09.522 MULTIGRAVIDA OF ADVANCED MATERNAL AGE IN SECOND TRIMESTER: ICD-10-CM

## 2024-05-27 ENCOUNTER — PROCEDURE VISIT (OUTPATIENT)
Dept: MATERNAL FETAL MEDICINE | Facility: CLINIC | Age: 39
End: 2024-05-27
Payer: MEDICAID

## 2024-05-27 ENCOUNTER — OFFICE VISIT (OUTPATIENT)
Dept: MATERNAL FETAL MEDICINE | Facility: CLINIC | Age: 39
End: 2024-05-27
Payer: MEDICAID

## 2024-05-27 VITALS
SYSTOLIC BLOOD PRESSURE: 101 MMHG | WEIGHT: 154.63 LBS | DIASTOLIC BLOOD PRESSURE: 65 MMHG | HEART RATE: 96 BPM | HEIGHT: 67 IN | BODY MASS INDEX: 24.27 KG/M2

## 2024-05-27 DIAGNOSIS — O24.415 GESTATIONAL DIABETES MELLITUS (GDM) IN SECOND TRIMESTER CONTROLLED ON ORAL HYPOGLYCEMIC DRUG: ICD-10-CM

## 2024-05-27 DIAGNOSIS — E04.2 MULTINODULAR GOITER: ICD-10-CM

## 2024-05-27 DIAGNOSIS — O09.522 MULTIGRAVIDA OF ADVANCED MATERNAL AGE IN SECOND TRIMESTER: ICD-10-CM

## 2024-05-27 DIAGNOSIS — O10.919 CHRONIC HYPERTENSION AFFECTING PREGNANCY: ICD-10-CM

## 2024-05-27 DIAGNOSIS — O99.342 MENTAL DISORDER AFFECTING PREGNANCY IN SECOND TRIMESTER: Primary | ICD-10-CM

## 2024-05-27 DIAGNOSIS — O24.912 DIABETES MELLITUS AFFECTING PREGNANCY IN SECOND TRIMESTER: ICD-10-CM

## 2024-05-27 PROCEDURE — 76811 OB US DETAILED SNGL FETUS: CPT | Mod: S$GLB,,, | Performed by: OBSTETRICS & GYNECOLOGY

## 2024-05-27 PROCEDURE — 3078F DIAST BP <80 MM HG: CPT | Mod: CPTII,S$GLB,, | Performed by: OBSTETRICS & GYNECOLOGY

## 2024-05-27 PROCEDURE — 3044F HG A1C LEVEL LT 7.0%: CPT | Mod: CPTII,S$GLB,, | Performed by: OBSTETRICS & GYNECOLOGY

## 2024-05-27 PROCEDURE — 1159F MED LIST DOCD IN RCRD: CPT | Mod: CPTII,S$GLB,, | Performed by: OBSTETRICS & GYNECOLOGY

## 2024-05-27 PROCEDURE — 1160F RVW MEDS BY RX/DR IN RCRD: CPT | Mod: CPTII,S$GLB,, | Performed by: OBSTETRICS & GYNECOLOGY

## 2024-05-27 PROCEDURE — 99214 OFFICE O/P EST MOD 30 MIN: CPT | Mod: 25,TH,S$GLB, | Performed by: OBSTETRICS & GYNECOLOGY

## 2024-05-27 PROCEDURE — 3008F BODY MASS INDEX DOCD: CPT | Mod: CPTII,S$GLB,, | Performed by: OBSTETRICS & GYNECOLOGY

## 2024-05-27 PROCEDURE — 3074F SYST BP LT 130 MM HG: CPT | Mod: CPTII,S$GLB,, | Performed by: OBSTETRICS & GYNECOLOGY

## 2024-05-27 NOTE — ASSESSMENT & PLAN NOTE
Patient presents for her routine OB visit.    Patient would like communication of their results via:      [facility]  Patient's current [facility] status: Active.    She reports a history of anxiety, depression, ADHD, and hx PPD. She stopped Xanax and Adderall at 15w. She reports stable symptoms. Discussed increased risk for peripartum and postpartum mood disorders. Precautions provided.      We have discussed the importance of optimization of mental health conditions during pregnancy in an effort to reduce the risk of postpartum mood disorders. We have discussed options for management including psychotherapy, counseling, cognitive behavioral therapy, exercise/yoga, journaling, and psychiatry services. She will notify our office if she is interested in being referred for any of the above.    5/27: She is feeling well.

## 2024-05-27 NOTE — PROGRESS NOTES
"MATERNAL-FETAL MEDICINE FOLLOWUP NOTE  SUBJECTIVE:     Ms. Ruby Angel is a 39 y.o.  female with IUP at 21w6d who is seen in consultation by MFM for evaluation and management of:  Problem   - Multigravida of advanced maternal age in second trimester   - Diabetes affecting pregnancy in second trimester   - Multinodular goiter   - Anxiety, depression, ADHD, hx PPD affecting pregnancy in second trimester   - Chronic hypertension affecting pregnancy       She is feeling well and has no current complaints. No LOF, VB, ctx. +FM. She thinks metformin is helping curb her appetite. She has no issues with her GI system on it.   BP normal.        Medication List with Changes/Refills   Current Medications    ASPIRIN (ECOTRIN) 81 MG EC TABLET    Take 1 tablet (81 mg total) by mouth once daily.    BLOOD SUGAR DIAGNOSTIC ( BLOOD GLUCOSE TEST STRIP) STRP    1 each by Misc.(Non-Drug; Combo Route) route 4 (four) times daily.    LANCETS 33 GAUGE MISC    Apply topically 2 (two) times daily.    METFORMIN (GLUCOPHAGE) 1000 MG TABLET    Take 1.5 tablets (1,500 mg total) by mouth daily with breakfast. W/ DIABETIC SNACK    PNV,CALCIUM 72/IRON/FOLIC ACID (PRENATAL VITAMIN) TAB    Take 1 tablet by mouth once daily.    TRUE METRIX GLUCOSE METER MISC    USE AS INSTRUCTED       Objective:   /65 (BP Location: Right arm)   Pulse 96   Ht 5' 7" (1.702 m)   Wt 70.1 kg (154 lb 10.4 oz)   LMP 2023 (Exact Date)   BMI 24.22 kg/m²     Ultrasound performed. See viewpoint for full ultrasound report.    A detailed fetal anatomic ultrasound examination was performed for the following high risk indication: Diabetes.   No fetal structural malformations are identified; however, fetal imaging is incomplete today (posterior fossa, AA).   A follow-up study will be scheduled to complete the fetal anatomic survey.   Fetal size today is consistent with established gestational age.   Cervical length by TA scanning is normal. "   Placental location is posterior without evidence of previa. A large subchorionic hematoma is noted.              ASSESSMENT/PLAN:     39 y.o.  female with IUP at 21w6d     - Anxiety, depression, ADHD, hx PPD affecting pregnancy in second trimester  She reports a history of anxiety, depression, ADHD, and hx PPD. She stopped Xanax and Adderall at 15w. She reports stable symptoms. Discussed increased risk for peripartum and postpartum mood disorders. Precautions provided.      We have discussed the importance of optimization of mental health conditions during pregnancy in an effort to reduce the risk of postpartum mood disorders. We have discussed options for management including psychotherapy, counseling, cognitive behavioral therapy, exercise/yoga, journaling, and psychiatry services. She will notify our office if she is interested in being referred for any of the above.    : She is feeling well.    - Chronic hypertension affecting pregnancy  She was initially diagnosed with cHTN approx 5 years ago and was on antihypertensives for a year. She reports she was able to be weaned off of meds after that time. Denies any problems with Bps since then. Of note, she had pre-eclampsia with her prior pregnancy.  Today I counseled the patient on maternal/fetal risks associated with CHTN during pregnancy. Risks include but not limited to fetal growth restriction, miscarriage, abruption, maternal end organ disease (renal failure, MI, and stroke),  delivery, development of superimposed preeclampsia, and eclampsia. She was counseled on the recommendations for blood pressure control, serial ultrasound for fetal growth assessment and  testing, and timing of delivery. I also counseled her on the recommendation for aspirin 81 mg daily which may decrease her risk of developing superimposed preeclampsia.     Recommendations (Please refer to [facility] guidelines):  -Continue aspirin 81 mg daily for  preeclampsia risk reduction (she is taking)  -No current medications  -Baseline evaluation with primary OB:   baseline P/C ratio, CMP, and CBC.  Maternal EKG  Maternal ophthalmic evaluation  Maternal echocardiogram if HTN has been long-standing or EKG is abnormal  -Serial fetal growth ultrasounds every 4-6 weeks, beginning at 26-28 weeks.   -Continued close observation of patient's blood pressures. Avoid hypotension as this has been associated with uteroplacental insufficiency.  -In conjunction with the CHAP study recommendation for BP control: If BP is persistently ?140/90 antihypertensive medication is recommended with goal BP of 120-140/80-90.  -Antepartum testing and delivery timing discussed in DM section.          - Diabetes affecting pregnancy in second trimester  She has a history of pre-diabetes, PCOS, and early onset GDM requiring insulin in her prior pregnancy. Most recent A1C at the beginning of pregnancy 5.3. She has not completed an early glucola, however, has been checking her sugars. Postprandial values out of range, therefore, counseling is as follows:    The patient was counseled regarding the risks of diabetes in pregnancy. These risks include but are not limited to an increased risk of , preeclampsia/gestational hypertension, fetal structural anomalies, macrosomia, prematurity, shoulder dystocia/birth injury,  hyperbilirubinemia and electrolyte issues, pulmonary immaturity and sudden stillbirth especially in those patients with poor glucose control. I also discussed with the patient the need for increased fetal surveillance with serial fetal growth assessments and third trimester fetal testing. I also reviewed the possibility of need for early delivery in those with poor glucose control or evidence of fetal growth abnormalities.    She has her log with her today. Her level of control is currently good. She is on metformin 1500mg qAM. No changes made  today.    Recommendations:  Baseline evaluation (to be ordered by primary OB provider):  Urine P/C ratio, CBC, CMP (completed)  Maternal EKG; echocardiogram if BMI > 30 or EKG is abnormal  Maternal ophthalmic exam (if hasn't been performed in last year) and podiatry exam  Hemoglobin A1C every trimester (Baseline-5.3)  Diabetic education referral and counseling re: goal blood sugars (< 95 mg/dl for fasting, < 120 mg/dl for 2 hour postprandial)  Medications:   Continue low dose aspirin for preeclampsia risk reduction  1 mg folic acid daily  Regimen: Metformin 1500mg qAM  She will submit her log to our office on a weekly basis via email or portal for review and management   Ultrasounds with [facility]:  Targeted anatomy survey at 20 weeks (scheduled with [facility])  Serial growth ultrasounds q 4-6 weeks at 26-28 weeks (scheduled with [facility])    A fetal echocardiogram is recommended at 22-24 weeks with pediatric cardiology ([facility] will arrange)    Initiate  surveillance at 32 weeks:   Weekly BPP or NST + AFV if good control.   If control is poor, twice weekly  fetal surveillance is recommended with BPP alternating with NST   Delivery timin 0/7 to 38 and 6/7 weeks if under good control without comorbidities  37 0/7 to 37 and 67 weeks if longstanding diabetes or poorly controlled, polyhydramnios, EFW>90th percentile, or BMI >= 40  36 0/7 to 37 and 6/7 weeks if vascular complications, prior stillbirth or other complicating conditions.  Recommend consideration of earlier delivery if IUGR, HTN, or other complications  Recommend offering  for delivery is EFW is 4500g or more near the time of delivery    Insulin management during labor and delivery (if needed)  -IF AM ADMIT: Give usual dose of intermediate acting (NPH) or long-acting insulin at bedtime the night before admit  -Hold morning dose of insulin or reduce to ½ dose   -Patients who require insulin should be scheduled as the first available (7 am or 7:30  am)  given the need for NPO status  -Check glucose every 2 hours in latent labor; hourly in active labor  -If blood glucose is less than 70 mg/dl, change IVF to D5 ½ NS at rate of 100-150 cc/hr and monitor blood glucose hourly   -IV infusion of regular insulin is recommended if glucose levels exceed 120 mg/dl    Postpartum management  -Insulin should be reduced by 1/2 of the pre-delivery dose within the first 6-24 hours following delivery.  -Patients who were well-controlled on oral regimens can often return to these following delivery.  -Patients who are breastfeeding should be advised to have small snacks before breastfeeding to reduce the risk of hypoglycemia.  -Patients should be referred to primary MD or Endocrinology for postpartum management.    The patient was advised to call for blood sugars less than 70 or greater than 200 prior to her next appointment. She was also advised that if she notes nausea/vomiting, inability to tolerate PO, or concerns about being able to take her insulin that she should contact her provider or present to the OB ED.      - Multinodular goiter  She has a history of goiter. She denies abnormalities with TFTs and has never had to be on medication. Last time TFTs were evaluated were in 2023.    Recommendations  - TFTs. If normal, no further testing.  (TSH was normal)    - Multigravida of advanced maternal age in second trimester  Today I counseled the patient on the relationship between maternal age and fetal aneuploidy.  We discussed the risks and benefits of screening tests versus definitive genetic testing (amniocentesis). She was counseled about her specific age-related risk of fetal aneuploidy. We discussed the limitations of ultrasound in the definitive diagnosis of fetal aneuploidy.  I quoted the patient a 1 in 900 procedure related risk of fetal loss with genetic amniocentesis.  We also discussed cell free fetal DNA screening including the sensitivity  and specificity of the test.  Her questions were answered and after today's consultation she did not want to pursue amniocentesis.  She did not want to pursue NIPT. She completed a quad screen with low risk results.  Additionally, we discussed the association between advanced maternal age (AMA) and preeclampsia, gestational diabetes, and fetal growth restriction.    Recommendations:  Detailed anatomic survey at 19-20 weeks (completed)  Growth assessment at 32 weeks          FOLLOW UP:   F/u in 4 weeks for US/MFM visit      Yulisa Melissa MD  Maternal Fetal Medicine

## 2024-05-27 NOTE — ASSESSMENT & PLAN NOTE
She has a history of pre-diabetes, PCOS, and early onset GDM requiring insulin in her prior pregnancy. Most recent A1C at the beginning of pregnancy 5.3. She has not completed an early glucola, however, has been checking her sugars. Postprandial values out of range, therefore, counseling is as follows:    The patient was counseled regarding the risks of diabetes in pregnancy. These risks include but are not limited to an increased risk of , preeclampsia/gestational hypertension, fetal structural anomalies, macrosomia, prematurity, shoulder dystocia/birth injury,  hyperbilirubinemia and electrolyte issues, pulmonary immaturity and sudden stillbirth especially in those patients with poor glucose control. I also discussed with the patient the need for increased fetal surveillance with serial fetal growth assessments and third trimester fetal testing. I also reviewed the possibility of need for early delivery in those with poor glucose control or evidence of fetal growth abnormalities.    She has her log with her today. Her level of control is currently good. She is on metformin 1500mg qAM. No changes made today.    Recommendations:  Baseline evaluation (to be ordered by primary OB provider):  Urine P/C ratio, CBC, CMP (completed)  Maternal EKG; echocardiogram if BMI > 30 or EKG is abnormal  Maternal ophthalmic exam (if hasn't been performed in last year) and podiatry exam  Hemoglobin A1C every trimester (Baseline-5.3)  Diabetic education referral and counseling re: goal blood sugars (< 95 mg/dl for fasting, < 120 mg/dl for 2 hour postprandial)  Medications:   Continue low dose aspirin for preeclampsia risk reduction  1 mg folic acid daily  Regimen: Metformin 1500mg qAM  She will submit her log to our office on a weekly basis via email or portal for review and management   Ultrasounds with M:  Targeted anatomy survey at 20 weeks (scheduled with M)  Serial growth ultrasounds q 4-6 weeks at 26-28 weeks  (scheduled with M)    A fetal echocardiogram is recommended at 22-24 weeks with pediatric cardiology (M will arrange)    Initiate  surveillance at 32 weeks:   Weekly BPP or NST + AFV if good control.   If control is poor, twice weekly  fetal surveillance is recommended with BPP alternating with NST   Delivery timin 0/7 to 38 and 6/7 weeks if under good control without comorbidities  37 0/7 to 37 and 67 weeks if longstanding diabetes or poorly controlled, polyhydramnios, EFW>90th percentile, or BMI >= 40  36 0/7 to 37 and 6/7 weeks if vascular complications, prior stillbirth or other complicating conditions.  Recommend consideration of earlier delivery if IUGR, HTN, or other complications  Recommend offering  for delivery is EFW is 4500g or more near the time of delivery    Insulin management during labor and delivery (if needed)  -IF AM ADMIT: Give usual dose of intermediate acting (NPH) or long-acting insulin at bedtime the night before admit  -Hold morning dose of insulin or reduce to ½ dose   -Patients who require insulin should be scheduled as the first available (7 am or 7:30 am)  given the need for NPO status  -Check glucose every 2 hours in latent labor; hourly in active labor  -If blood glucose is less than 70 mg/dl, change IVF to D5 ½ NS at rate of 100-150 cc/hr and monitor blood glucose hourly   -IV infusion of regular insulin is recommended if glucose levels exceed 120 mg/dl    Postpartum management  -Insulin should be reduced by 1/2 of the pre-delivery dose within the first 6-24 hours following delivery.  -Patients who were well-controlled on oral regimens can often return to these following delivery.  -Patients who are breastfeeding should be advised to have small snacks before breastfeeding to reduce the risk of hypoglycemia.  -Patients should be referred to primary MD or Endocrinology for postpartum management.    The patient was advised to call for blood  sugars less than 70 or greater than 200 prior to her next appointment. She was also advised that if she notes nausea/vomiting, inability to tolerate PO, or concerns about being able to take her insulin that she should contact her provider or present to the OB ED.

## 2024-05-27 NOTE — ASSESSMENT & PLAN NOTE
Today I counseled the patient on the relationship between maternal age and fetal aneuploidy.  We discussed the risks and benefits of screening tests versus definitive genetic testing (amniocentesis). She was counseled about her specific age-related risk of fetal aneuploidy. We discussed the limitations of ultrasound in the definitive diagnosis of fetal aneuploidy.  I quoted the patient a 1 in 900 procedure related risk of fetal loss with genetic amniocentesis.  We also discussed cell free fetal DNA screening including the sensitivity and specificity of the test.  Her questions were answered and after today's consultation she did not want to pursue amniocentesis.  She did not want to pursue NIPT. She completed a quad screen with low risk results.  Additionally, we discussed the association between advanced maternal age (AMA) and preeclampsia, gestational diabetes, and fetal growth restriction.    Recommendations:  Detailed anatomic survey at 19-20 weeks (completed)  Growth assessment at 32 weeks

## 2024-05-27 NOTE — ASSESSMENT & PLAN NOTE
She has a history of goiter. She denies abnormalities with TFTs and has never had to be on medication. Last time TFTs were evaluated were in September of 2023.    Recommendations  - TFTs. If normal, no further testing.  (TSH was normal)

## 2024-05-27 NOTE — ASSESSMENT & PLAN NOTE
She was initially diagnosed with cHTN approx 5 years ago and was on antihypertensives for a year. She reports she was able to be weaned off of meds after that time. Denies any problems with Bps since then. Of note, she had pre-eclampsia with her prior pregnancy.  Today I counseled the patient on maternal/fetal risks associated with CHTN during pregnancy. Risks include but not limited to fetal growth restriction, miscarriage, abruption, maternal end organ disease (renal failure, MI, and stroke),  delivery, development of superimposed preeclampsia, and eclampsia. She was counseled on the recommendations for blood pressure control, serial ultrasound for fetal growth assessment and  testing, and timing of delivery. I also counseled her on the recommendation for aspirin 81 mg daily which may decrease her risk of developing superimposed preeclampsia.     Recommendations (Please refer to [facility] guidelines):  -Continue aspirin 81 mg daily for preeclampsia risk reduction (she is taking)  -No current medications  -Baseline evaluation with primary OB:   baseline P/C ratio, CMP, and CBC.  Maternal EKG  Maternal ophthalmic evaluation  Maternal echocardiogram if HTN has been long-standing or EKG is abnormal  -Serial fetal growth ultrasounds every 4-6 weeks, beginning at 26-28 weeks.   -Continued close observation of patient's blood pressures. Avoid hypotension as this has been associated with uteroplacental insufficiency.  -In conjunction with the CHAP study recommendation for BP control: If BP is persistently ?140/90 antihypertensive medication is recommended with goal BP of 120-140/80-90.  -Antepartum testing and delivery timing discussed in DM section.

## 2024-06-04 ENCOUNTER — OFFICE VISIT (OUTPATIENT)
Dept: FAMILY MEDICINE | Facility: CLINIC | Age: 39
End: 2024-06-04
Payer: MEDICAID

## 2024-06-04 ENCOUNTER — OFFICE VISIT (OUTPATIENT)
Dept: PEDIATRIC CARDIOLOGY | Facility: CLINIC | Age: 39
End: 2024-06-04
Payer: MEDICAID

## 2024-06-04 ENCOUNTER — TELEPHONE (OUTPATIENT)
Dept: MATERNAL FETAL MEDICINE | Facility: CLINIC | Age: 39
End: 2024-06-04
Payer: MEDICAID

## 2024-06-04 VITALS
HEART RATE: 91 BPM | WEIGHT: 156.31 LBS | SYSTOLIC BLOOD PRESSURE: 112 MMHG | BODY MASS INDEX: 24.53 KG/M2 | HEIGHT: 67 IN | DIASTOLIC BLOOD PRESSURE: 70 MMHG

## 2024-06-04 VITALS
WEIGHT: 155 LBS | RESPIRATION RATE: 20 BRPM | DIASTOLIC BLOOD PRESSURE: 66 MMHG | OXYGEN SATURATION: 98 % | TEMPERATURE: 98 F | SYSTOLIC BLOOD PRESSURE: 106 MMHG | HEIGHT: 67 IN | HEART RATE: 86 BPM | BODY MASS INDEX: 24.33 KG/M2

## 2024-06-04 DIAGNOSIS — E28.2 PCOS (POLYCYSTIC OVARIAN SYNDROME): ICD-10-CM

## 2024-06-04 DIAGNOSIS — Z86.19 HISTORY OF ELISA POSITIVE FOR HSV: ICD-10-CM

## 2024-06-04 DIAGNOSIS — O24.912 DIABETES MELLITUS AFFECTING PREGNANCY IN SECOND TRIMESTER: ICD-10-CM

## 2024-06-04 DIAGNOSIS — R73.03 PREDIABETES: ICD-10-CM

## 2024-06-04 DIAGNOSIS — Z3A.23 23 WEEKS GESTATION OF PREGNANCY: Primary | ICD-10-CM

## 2024-06-04 DIAGNOSIS — B00.9 HERPES VIRUS INFECTION IN MOTHER DURING SECOND TRIMESTER OF PREGNANCY: ICD-10-CM

## 2024-06-04 DIAGNOSIS — Z03.73 FETAL ANOMALY SUSPECTED BUT NOT FOUND: Primary | ICD-10-CM

## 2024-06-04 DIAGNOSIS — O98.512 HERPES VIRUS INFECTION IN MOTHER DURING SECOND TRIMESTER OF PREGNANCY: ICD-10-CM

## 2024-06-04 DIAGNOSIS — O10.919 CHRONIC HYPERTENSION AFFECTING PREGNANCY: ICD-10-CM

## 2024-06-04 DIAGNOSIS — E04.2 MULTINODULAR GOITER: ICD-10-CM

## 2024-06-04 DIAGNOSIS — O09.522 MULTIGRAVIDA OF ADVANCED MATERNAL AGE IN SECOND TRIMESTER: ICD-10-CM

## 2024-06-04 DIAGNOSIS — O99.342 MENTAL DISORDER AFFECTING PREGNANCY IN SECOND TRIMESTER: ICD-10-CM

## 2024-06-04 DIAGNOSIS — O99.342 MENTAL DISORDER AFFECTING PREGNANCY IN SECOND TRIMESTER: Primary | ICD-10-CM

## 2024-06-04 DIAGNOSIS — O09.299 HISTORY OF PRE-ECLAMPSIA IN PRIOR PREGNANCY, CURRENTLY PREGNANT: ICD-10-CM

## 2024-06-04 LAB
BILIRUB SERPL-MCNC: NORMAL MG/DL
BLOOD URINE, POC: NORMAL
CLARITY, POC UA: CLEAR
COLOR, POC UA: YELLOW
GLUCOSE UR QL STRIP: NORMAL
KETONES UR QL STRIP: NORMAL
LEUKOCYTE ESTERASE URINE, POC: NORMAL
NITRITE, POC UA: NORMAL
PH, POC UA: 7
PROTEIN, POC: NORMAL
SPECIFIC GRAVITY, POC UA: 1.02
UROBILINOGEN, POC UA: 0.2

## 2024-06-04 PROCEDURE — 3074F SYST BP LT 130 MM HG: CPT | Mod: CPTII,S$GLB,, | Performed by: PEDIATRICS

## 2024-06-04 PROCEDURE — 3044F HG A1C LEVEL LT 7.0%: CPT | Mod: CPTII,S$GLB,, | Performed by: PEDIATRICS

## 2024-06-04 PROCEDURE — 99213 OFFICE O/P EST LOW 20 MIN: CPT | Mod: PBBFAC

## 2024-06-04 PROCEDURE — 3008F BODY MASS INDEX DOCD: CPT | Mod: CPTII,S$GLB,, | Performed by: PEDIATRICS

## 2024-06-04 PROCEDURE — 3078F DIAST BP <80 MM HG: CPT | Mod: CPTII,S$GLB,, | Performed by: PEDIATRICS

## 2024-06-04 PROCEDURE — 1159F MED LIST DOCD IN RCRD: CPT | Mod: CPTII,S$GLB,, | Performed by: PEDIATRICS

## 2024-06-04 PROCEDURE — 99203 OFFICE O/P NEW LOW 30 MIN: CPT | Mod: 25,S$GLB,, | Performed by: PEDIATRICS

## 2024-06-04 PROCEDURE — 81002 URINALYSIS NONAUTO W/O SCOPE: CPT | Mod: PBBFAC

## 2024-06-04 PROCEDURE — 1160F RVW MEDS BY RX/DR IN RCRD: CPT | Mod: CPTII,S$GLB,, | Performed by: PEDIATRICS

## 2024-06-04 NOTE — PROGRESS NOTES
[facility] - Pediatric Cardiology Fetal Cardiology Clinic    Today, I had the pleasure of evaluating Ruby Angel who is now 39 y.o. and carrying her second pregnancy at 23 0/7 weeks gestation with an YOMI of 10/1/24. She was referred for evaluation of the fetal heart due to a history of early gestational diabetes.      She is carrying a female fetus, named Kevin.      Obstetric History:    .  Her OB care is by the [facility] group.  Her MFM care is by Dr. Melissa.    Past Medical History:   Diagnosis Date    Abnormal Pap smear of cervix     Anemia     Anxiety     Depression     Diabetes mellitus     Herpes simplex virus (HSV) infection     Hypertension     Postpartum depression     Vitamin D deficiency          Current Outpatient Medications:     aspirin (ECOTRIN) 81 MG EC tablet, Take 1 tablet (81 mg total) by mouth once daily., Disp: 30 tablet, Rfl: 0    metFORMIN (GLUCOPHAGE) 1000 MG tablet, Take 1.5 tablets (1,500 mg total) by mouth daily with breakfast. W/ DIABETIC SNACK, Disp: 45 tablet, Rfl: 3    PNV,calcium 72/iron/folic acid (PRENATAL VITAMIN) Tab, Take 1 tablet by mouth once daily., Disp: 30 tablet, Rfl: 2    blood sugar diagnostic ( BLOOD GLUCOSE TEST STRIP) Strp, 1 each by Misc.(Non-Drug; Combo Route) route 4 (four) times daily., Disp: 100 each, Rfl: 3    lancets 33 gauge Misc, Apply topically 2 (two) times daily., Disp: , Rfl:     TRUE METRIX GLUCOSE METER Misc, USE AS INSTRUCTED, Disp: , Rfl:     Family History: Negative for congenital heart disease, early coronary artery disease, sudden unexplained death, connective tissues disorders, genetic syndromes, multiple miscarriages or other congenital anomalies.    Social History: Mrs. Angel is  to the father of the baby.    FETAL ECHOCARDIOGRAM (summary):  Fetal echocardiogram at 23 0/7 weeks gestation for a history of early gestational diabetes. YOMI 10/1/24. Normally connected heart. Normal sinus rhythm. No ectopy or sustained  arrhythmia demonstrated throughout the study. Normal fetal atrial and ductal level shunting. No ventricular level shunting. Normal atrioventricular and semilunar valve structure and function. Normal ductal and aortic arches. Normal biventricular size and systolic function. No pericardial effusion.   (Full report in electronic medical record)    Impression:  Single active female fetus at 23 wga.  Normal fetal echocardiogram.      Todays fetal echocardiogram is normal, within the limitations of fetal echocardiography.  I discussed with her that fetal echocardiography is insufficiently sensitive to rule out all septal defects, anomalies of pulmonary and systemic veins, arch anomalies, and some valvar abnormalities, nor can it ensure that the ductus arteriosus and foramen ovale will spontaneously close.     Recommendations:  Location, timing, and mode of delivery will be determined by the obstetrical team.  She does not require further follow-up in the fetal echocardiography clinic, but I would be happy to see her again if additional questions or concerns arise.    Should there be any concerns about the baby's heart after birth, a post-ry echocardiogram and cardiology consultation are recommended.     The above information was discussed in detail including the use of diagrams, with 30 minutes of total face to face time, with greater than 50% with counseling and coordination of care.  The discussion of the diagnosis and treatment options is as described above.      Gokul Garay MD, MPH  Pediatric and Fetal Cardiology  [facility]   [address] [place] [address]    Office: [phone]  Cell: [phone]

## 2024-06-04 NOTE — PROGRESS NOTES
"[facility] HIGH RISK OB OFFICE VISIT NOTE  Ruby Angel  [id number]  2024    Chief Complaint: Routine Prenatal Visit      Ruby Angel is a 39 y.o. female   23w0d YOMI 10/1/2024, by 11 week Ultrasound presenting to [facility] high risk for routine visit.    Current Issues: Patient has no concerns. Reports good fetal movement. Sent logs to [facility] yesterday. Has some GI side effects from metformin.    Chronic Issues:    -AMA  -DM  -Multinodular goiter  -HTN, chronic  -History of prediabetes/PCOS  - ADHD  - Herpes Simplex  - Previous C section  - History of preeclampsia with severe features in prior pregnancy and required IV magnesium during labor and hospitalization.       Gestational History:  (date, GA, length labor, BW, sex, type, anesthesia, place, complications)  OB History    Para Term  AB Living   4 1 1 0 2 1   SAB IAB Ectopic Multiple Live Births   2 0 0 0 1      # Outcome Date GA Lbr Luis/2nd Weight Sex Type Anes PTL Lv   4 Current            3 Term 10/29/21 39w4d  3.09 kg (6 lb 13 oz) M CS-Unspec EPI N ESTUARDO      Complications: Failure to Progress in Second Stage   2 SAB 2018 10w0d    SAB         Birth Comments: D&C   1 2018 5w0d    SAB          Antepartum specific ROS  - Fetal movements: Yes  - Vaginal bleeding: No  - Vaginal discharge: No  - Loss of fluid: No  - Contractions: No  - Headaches: No  - Vision changes: No  - Edema: No    Blood pressure 106/66, pulse 86, temperature 98 °F (36.7 °C), temperature source Oral, resp. rate 20, height 5' 7" (1.702 m), weight 70.3 kg (155 lb), last menstrual period 2023, SpO2 98%.   Physical Exam  Physical Exam  Vitals:    24 0814   BP: 106/66   Pulse: 86   Resp: 20   Temp: 98 °F (36.7 °C)     General: in no acute distress  RESP: non labored  CV: regular rate  no edema  ABD: gravid, nontender, BS+  FHT: 145   Fundal Height: 24    Current Medications:   Current Outpatient Medications   Medication Sig Dispense Refill    aspirin " (ECOTRIN) 81 MG EC tablet Take 1 tablet (81 mg total) by mouth once daily. 30 tablet 0    blood sugar diagnostic ( BLOOD GLUCOSE TEST STRIP) Strp 1 each by Misc.(Non-Drug; Combo Route) route 4 (four) times daily. 100 each 3    lancets 33 gauge Misc Apply topically 2 (two) times daily.      metFORMIN (GLUCOPHAGE) 1000 MG tablet Take 1.5 tablets (1,500 mg total) by mouth daily with breakfast. W/ DIABETIC SNACK 45 tablet 3    PNV,calcium 72/iron/folic acid (PRENATAL VITAMIN) Tab Take 1 tablet by mouth once daily. 30 tablet 2    TRUE METRIX GLUCOSE METER Misc USE AS INSTRUCTED       No current facility-administered medications for this visit.     Initial OB Labs: Ordered on 3/12/24  - Blood Type and Rh: A positive  - CBC H/H: 13.5/39.5  - HIV: NR  - RPR: NR  - GC: Negative  - CT: Negative  - HBsAg: NR  - HCVAb: NR  - Rubella: Vaccinated  - Varicella: Vaccinated  - UA & Culture: No growth  - Sickle Cell Screen: Negative  - PAP: NIL; HR-HPV negative  - Influenza vaccine date: 03/12/2024    15-20 Weeks: Lab Ordered 04/16/2024  - Quad Screen:Normal Risk    28 Week Lab  - 1H GTT:   - Rhogam:   - Date of Tdap:   - CBC H/H:   - RPR:   - BTL consent:     37 Week Lab  - CBC H/H:   - RPR:   - GBS Culture:   - HIV:   - Cervical GC:     Imaging:   Imaging:  Initial U/S: 03/12/2024  Single viable intrauterine pregnancy with a crown-rump length indicative of an 11 week 1 day gestation.    Anatomy Scan 5/21/2024:   Impression   =========   A detailed fetal anatomic ultrasound examination was performed for the following high risk indication: Diabetes.   No fetal structural malformations are identified; however, fetal imaging is incomplete today (posterior fossa, AA).   A follow-up study will be scheduled to complete the fetal anatomic survey.   Fetal size today is consistent with established gestational age.   Cervical length by TA scanning is normal.   Placental location is posterior without evidence of previa. A large  subchorionic hematoma is noted.     Recommendation   ==============   Suggest repeat scan in 4 weeks for growth.     Assessment:   1. 23 weeks gestation of pregnancy    2. - Anxiety, depression, ADHD, hx PPD affecting pregnancy in second trimester    3. - Chronic hypertension affecting pregnancy    4. - Herpes virus infection in mother during second trimester of pregnancy    5. History of MILES positive for HSV    6. - Multinodular goiter    7. Prediabetes    8. PCOS (polycystic ovarian syndrome)    9. - Diabetes affecting pregnancy in second trimester    10. History of pre-eclampsia in prior pregnancy, currently pregnant    11. - Multigravida of advanced maternal age in second trimester      Plan:  OB protocol  ASA, PNV  Mood stable. Denied SI/HI  BP at goal. Continue logs with MFM  Suppressive therapy at 36 weeks; contact clinic if outbreak occurs  Normal TSH  Continue Metformin, logs with MFM  ED precautions discussed in depth: vaginal bleeding or leaking fluid, belly cramping or pain, SOB/chest pain, swelling of the face/lower extremities, vision changes. If don't feel the baby move in over an hour. Severe headache that are not resolved with medication     RTC 3-4 weeks HROB      Orders Placed This Encounter   Procedures    POCT URINE DIPSTICK WITHOUT MICROSCOPE

## 2024-06-04 NOTE — TELEPHONE ENCOUNTER
Per Crystal, after reviewing glucose log will need to start Insulin pt is maxed out on the metformin, she will take Levemir 6u qam and continue with the same dose of Metformin.    I spoke with pt informed pt of rec, and instructed pt on how much and when to take the insulin. Rx sent to [facility] in [place] per pt. Ruby verbalized understanding.

## 2024-06-24 ENCOUNTER — OFFICE VISIT (OUTPATIENT)
Dept: MATERNAL FETAL MEDICINE | Facility: CLINIC | Age: 39
End: 2024-06-24
Payer: MEDICAID

## 2024-06-24 ENCOUNTER — PROCEDURE VISIT (OUTPATIENT)
Dept: MATERNAL FETAL MEDICINE | Facility: CLINIC | Age: 39
End: 2024-06-24
Payer: MEDICAID

## 2024-06-24 VITALS
SYSTOLIC BLOOD PRESSURE: 106 MMHG | DIASTOLIC BLOOD PRESSURE: 67 MMHG | WEIGHT: 155.63 LBS | BODY MASS INDEX: 24.43 KG/M2 | HEART RATE: 101 BPM | HEIGHT: 67 IN

## 2024-06-24 DIAGNOSIS — O99.342 MENTAL DISORDER AFFECTING PREGNANCY IN SECOND TRIMESTER: ICD-10-CM

## 2024-06-24 DIAGNOSIS — E04.2 MULTINODULAR GOITER: ICD-10-CM

## 2024-06-24 DIAGNOSIS — O99.342 MENTAL DISORDER AFFECTING PREGNANCY IN SECOND TRIMESTER: Primary | ICD-10-CM

## 2024-06-24 DIAGNOSIS — O10.919 CHRONIC HYPERTENSION AFFECTING PREGNANCY: ICD-10-CM

## 2024-06-24 DIAGNOSIS — B00.9 HERPES VIRUS INFECTION IN MOTHER DURING SECOND TRIMESTER OF PREGNANCY: ICD-10-CM

## 2024-06-24 DIAGNOSIS — O98.512 HERPES VIRUS INFECTION IN MOTHER DURING SECOND TRIMESTER OF PREGNANCY: ICD-10-CM

## 2024-06-24 DIAGNOSIS — O24.912 DIABETES MELLITUS AFFECTING PREGNANCY IN SECOND TRIMESTER: ICD-10-CM

## 2024-06-24 DIAGNOSIS — O99.612 CONSTIPATION DURING PREGNANCY IN SECOND TRIMESTER: ICD-10-CM

## 2024-06-24 DIAGNOSIS — O09.522 MULTIGRAVIDA OF ADVANCED MATERNAL AGE IN SECOND TRIMESTER: ICD-10-CM

## 2024-06-24 DIAGNOSIS — K59.00 CONSTIPATION DURING PREGNANCY IN SECOND TRIMESTER: ICD-10-CM

## 2024-06-24 PROCEDURE — 3074F SYST BP LT 130 MM HG: CPT | Mod: CPTII,S$GLB,, | Performed by: OBSTETRICS & GYNECOLOGY

## 2024-06-24 PROCEDURE — 3008F BODY MASS INDEX DOCD: CPT | Mod: CPTII,S$GLB,, | Performed by: OBSTETRICS & GYNECOLOGY

## 2024-06-24 PROCEDURE — 1160F RVW MEDS BY RX/DR IN RCRD: CPT | Mod: CPTII,S$GLB,, | Performed by: OBSTETRICS & GYNECOLOGY

## 2024-06-24 PROCEDURE — 1159F MED LIST DOCD IN RCRD: CPT | Mod: CPTII,S$GLB,, | Performed by: OBSTETRICS & GYNECOLOGY

## 2024-06-24 PROCEDURE — 99214 OFFICE O/P EST MOD 30 MIN: CPT | Mod: TH,S$GLB,, | Performed by: OBSTETRICS & GYNECOLOGY

## 2024-06-24 PROCEDURE — 3044F HG A1C LEVEL LT 7.0%: CPT | Mod: CPTII,S$GLB,, | Performed by: OBSTETRICS & GYNECOLOGY

## 2024-06-24 PROCEDURE — 3078F DIAST BP <80 MM HG: CPT | Mod: CPTII,S$GLB,, | Performed by: OBSTETRICS & GYNECOLOGY

## 2024-06-24 PROCEDURE — 76816 OB US FOLLOW-UP PER FETUS: CPT | Mod: S$GLB,,, | Performed by: OBSTETRICS & GYNECOLOGY

## 2024-06-24 NOTE — ASSESSMENT & PLAN NOTE
She reports a history of anxiety, depression, ADHD, and hx PPD. She stopped Xanax and Adderall at 15w. She reports stable symptoms. Discussed increased risk for peripartum and postpartum mood disorders. Precautions provided.      We have discussed the importance of optimization of mental health conditions during pregnancy in an effort to reduce the risk of postpartum mood disorders. We have discussed options for management including psychotherapy, counseling, cognitive behavioral therapy, exercise/yoga, journaling, and psychiatry services. She will notify our office if she is interested in being referred for any of the above.    5/27: She is feeling well.

## 2024-06-24 NOTE — ASSESSMENT & PLAN NOTE
Discussed in detail. +hydrating, +moving well. Having upset stomach in the mornings also.    Discussed colace, miralax, fiber and stool under feet while emptying.

## 2024-06-24 NOTE — ASSESSMENT & PLAN NOTE
She was initially diagnosed with cHTN approx 5 years ago and was on antihypertensives for a year. She reports she was able to be weaned off of meds after that time. Denies any problems with Bps since then. Of note, she had pre-eclampsia with her prior pregnancy.  Prevouisly counseled the patient on maternal/fetal risks associated with CHTN during pregnancy. Risks include but not limited to fetal growth restriction, miscarriage, abruption, maternal end organ disease (renal failure, MI, and stroke),  delivery, development of superimposed preeclampsia, and eclampsia.     Recommendations (Please refer to [facility] guidelines):  -Continue aspirin 81 mg daily for preeclampsia risk reduction (she is taking)  -No current medications  -Baseline evaluation with primary OB:   baseline P/C ratio, CMP, and CBC.  Maternal EKG  Maternal ophthalmic evaluation  Maternal echocardiogram if HTN has been long-standing or EKG is abnormal  -Serial fetal growth ultrasounds every 4-6 weeks, beginning at 26-28 weeks.   -Continued close observation of patient's blood pressures. Avoid hypotension as this has been associated with uteroplacental insufficiency.  -In conjunction with the CHAP study recommendation for BP control: If BP is persistently ?140/90 antihypertensive medication is recommended with goal BP of 120-140/80-90.  -Antepartum testing and delivery timing discussed in DM section.

## 2024-06-24 NOTE — ASSESSMENT & PLAN NOTE
Previously counseled the patient on the relationship between maternal age and fetal aneuploidy. Additionally, we discussed the association between advanced maternal age (AMA) and preeclampsia, gestational diabetes, and fetal growth restriction.    She completed a quad screen with low risk results.    Recommendations:  Detailed anatomic survey at 19-20 weeks (completed)

## 2024-06-24 NOTE — ASSESSMENT & PLAN NOTE
She has a history of pre-diabetes, PCOS, and early onset GDM requiring insulin in her prior pregnancy. Most recent A1C at the beginning of pregnancy 5.3. She has not completed an early glucola, however, has been checking her sugars. Postprandial values out of range, therefore, counseling is as follows:  The patient was counseled regarding the risks of diabetes in pregnancy. These risks include but are not limited to an increased risk of , preeclampsia/gestational hypertension, fetal structural anomalies, macrosomia, prematurity, shoulder dystocia/birth injury,  hyperbilirubinemia and electrolyte issues, pulmonary immaturity and sudden stillbirth especially in those patients with poor glucose control.     She has her log with her today. She is on metformin 1500mg and Levemir 6u QAM. She has a few postprandial values that are out of range. I have increased her insulin dose today and if stomach aches do not improve after treating constipation, I instructed her to decrease her metformin if still having symptoms.    Recommendations:  Baseline evaluation (to be ordered by primary OB provider):  Urine P/C ratio, CBC, CMP (completed)  Maternal EKG; echocardiogram if BMI > 30 or EKG is abnormal  Maternal ophthalmic exam (if hasn't been performed in last year) and podiatry exam  Hemoglobin A1C every trimester (Baseline-5.3)  Diabetic education referral and counseling re: goal blood sugars (< 95 mg/dl for fasting, < 120 mg/dl for 2 hour postprandial)  Medications:   Continue low dose aspirin for preeclampsia risk reduction  1 mg folic acid daily  Regimen: Metformin 1500mg qAM, Levemir 8u QAM  She will submit her log to our office on a weekly basis via email or portal for review and management   Ultrasounds with [facility]:  Targeted anatomy survey at 20 weeks (completed)  Serial growth ultrasounds q 4weeks (scheduled with [facility])    A fetal echocardiogram is recommended at 22-24 weeks with pediatric cardiology  (completed)    Initiate  surveillance at 32 weeks:   Weekly BPP or NST + AFV if good control.   If control is poor, twice weekly  fetal surveillance is recommended with BPP alternating with NST   Delivery timin 0/7 to 38 and 6/7 weeks if under good control without comorbidities  37 0/7 to 37 and 67 weeks if longstanding diabetes or poorly controlled, polyhydramnios, EFW>90th percentile, or BMI >= 40  36 0/7 to 37 and 6/7 weeks if vascular complications, prior stillbirth or other complicating conditions.  Recommend consideration of earlier delivery if IUGR, HTN, or other complications  Recommend offering  for delivery is EFW is 4500g or more near the time of delivery    Insulin management during labor and delivery (if needed)  -IF AM ADMIT: Give usual dose of intermediate acting (NPH) or long-acting insulin at bedtime the night before admit  -Hold morning dose of insulin or reduce to ½ dose   -Patients who require insulin should be scheduled as the first available (7 am or 7:30 am)  given the need for NPO status  -Check glucose every 2 hours in latent labor; hourly in active labor  -If blood glucose is less than 70 mg/dl, change IVF to D5 ½ NS at rate of 100-150 cc/hr and monitor blood glucose hourly   -IV infusion of regular insulin is recommended if glucose levels exceed 120 mg/dl    Postpartum management  -Insulin should be reduced by 1/2 of the pre-delivery dose within the first 6-24 hours following delivery.  -Patients who were well-controlled on oral regimens can often return to these following delivery.  -Patients who are breastfeeding should be advised to have small snacks before breastfeeding to reduce the risk of hypoglycemia.  -Patients should be referred to primary MD or Endocrinology for postpartum management.    The patient was advised to call for blood sugars less than 70 or greater than 200 prior to her next appointment. She was also advised that if she notes  nausea/vomiting, inability to tolerate PO, or concerns about being able to take her insulin that she should contact her provider or present to the OB ED.

## 2024-06-24 NOTE — PROGRESS NOTES
"Maternal Fetal Medicine follow up consult    SUBJECTIVE:     Ruby Angel is a 39 y.o.  female with IUP at 25w6d who is seen in follow up consultation by M.  Pregnancy complications include:   Problem   - Multigravida of advanced maternal age in second trimester   - Diabetes affecting pregnancy in second trimester   - Herpes virus infection in mother during second trimester of pregnancy   - Multinodular goiter   - Anxiety, depression, ADHD, hx PPD affecting pregnancy in second trimester   - Chronic hypertension affecting pregnancy     Ruby presents for routine follow up appointment.  She has a sugar log for review.  Completed fetal echo with pediatric cardiology on 24- normal.  Denies LOF, VB, contractions. Positive fetal movement.  She says she is having some stomach aches in the AM after taking metformin and insulin. Her BG are never low but under good control. She endorses constipation is an issue.    Previous notes reviewed.   No changes to medical, surgical, family, social, or obstetric history.  Interval history since last MFM visit: see above  Medications reviewed.    Care team members:  [facility] - Primary OB     OBJECTIVE:   /67 (BP Location: Right arm)   Pulse 101   Ht 5' 7" (1.702 m)   Wt 70.6 kg (155 lb 10.3 oz)   LMP 2023 (Exact Date)   BMI 24.38 kg/m²     Ultrasound performed. See viewpoint for full ultrasound report.    A viable corral pregnancy is visualized in cephalic presentation.  Estimated fetal weight is at the 50th percentile with an abdominal circumference at the 78th percentile.    No fetal abnormalities are noted and anatomic survey is complete. Amniotic fluid volume is normal.  Placenta is posterior.          ASSESSMENT/PLAN:     39 y.o.  female with IUP at 25w6d    - Anxiety, depression, ADHD, hx PPD affecting pregnancy in second trimester  She reports a history of anxiety, depression, ADHD, and hx PPD. She stopped Xanax and Adderall at 15w. She " reports stable symptoms. Discussed increased risk for peripartum and postpartum mood disorders. Precautions provided.      We have discussed the importance of optimization of mental health conditions during pregnancy in an effort to reduce the risk of postpartum mood disorders. We have discussed options for management including psychotherapy, counseling, cognitive behavioral therapy, exercise/yoga, journaling, and psychiatry services. She will notify our office if she is interested in being referred for any of the above.    : She is feeling well.    - Chronic hypertension affecting pregnancy  She was initially diagnosed with cHTN approx 5 years ago and was on antihypertensives for a year. She reports she was able to be weaned off of meds after that time. Denies any problems with Bps since then. Of note, she had pre-eclampsia with her prior pregnancy.  Prevouisly counseled the patient on maternal/fetal risks associated with CHTN during pregnancy. Risks include but not limited to fetal growth restriction, miscarriage, abruption, maternal end organ disease (renal failure, MI, and stroke),  delivery, development of superimposed preeclampsia, and eclampsia.     Recommendations (Please refer to [facility] guidelines):  -Continue aspirin 81 mg daily for preeclampsia risk reduction (she is taking)  -No current medications  -Baseline evaluation with primary OB:   baseline P/C ratio, CMP, and CBC.  Maternal EKG  Maternal ophthalmic evaluation  Maternal echocardiogram if HTN has been long-standing or EKG is abnormal  -Serial fetal growth ultrasounds every 4-6 weeks, beginning at 26-28 weeks.   -Continued close observation of patient's blood pressures. Avoid hypotension as this has been associated with uteroplacental insufficiency.  -In conjunction with the CHAP study recommendation for BP control: If BP is persistently ?140/90 antihypertensive medication is recommended with goal BP of 120-140/80-90.  -Antepartum  testing and delivery timing discussed in DM section.          - Herpes virus infection in mother during second trimester of pregnancy  Maternal-fetal transmission of HSV is the major consequence of maternal HSV infection. The most common mode of transmission is via direct contact of the fetus with infected vaginal secretions during delivery.  Intrauterine transmission from transplacental or ascending infection also occurs rarely as evidenced by the presence of early  infection despite delivery by  delivery before both labor and rupture of fetal membranes.    Recommendations:  -Start daily suppressive therapy at 36 weeks: Acyclovir 400mg po TID or Valacyclovir 500mg po BID      - Multinodular goiter  She has a history of goiter. She denies abnormalities with TFTs and has never had to be on medication. Last time TFTs were evaluated were in 2023.    Recommendations  - TFTs. If normal, no further testing.  (TSH was normal)    - Diabetes affecting pregnancy in second trimester  She has a history of pre-diabetes, PCOS, and early onset GDM requiring insulin in her prior pregnancy. Most recent A1C at the beginning of pregnancy 5.3. She has not completed an early glucola, however, has been checking her sugars. Postprandial values out of range, therefore, counseling is as follows:  The patient was counseled regarding the risks of diabetes in pregnancy. These risks include but are not limited to an increased risk of , preeclampsia/gestational hypertension, fetal structural anomalies, macrosomia, prematurity, shoulder dystocia/birth injury,  hyperbilirubinemia and electrolyte issues, pulmonary immaturity and sudden stillbirth especially in those patients with poor glucose control.     She has her log with her today. She is on metformin 1500mg and Levemir 6u QAM. She has a few postprandial values that are out of range. I have increased her insulin dose today and if stomach aches do not  improve after treating constipation, I instructed her to decrease her metformin if still having symptoms.    Recommendations:  Baseline evaluation (to be ordered by primary OB provider):  Urine P/C ratio, CBC, CMP (completed)  Maternal EKG; echocardiogram if BMI > 30 or EKG is abnormal  Maternal ophthalmic exam (if hasn't been performed in last year) and podiatry exam  Hemoglobin A1C every trimester (Baseline-5.3)  Diabetic education referral and counseling re: goal blood sugars (< 95 mg/dl for fasting, < 120 mg/dl for 2 hour postprandial)  Medications:   Continue low dose aspirin for preeclampsia risk reduction  1 mg folic acid daily  Regimen: Metformin 1500mg qAM, Levemir 8u QAM  She will submit her log to our office on a weekly basis via email or portal for review and management   Ultrasounds with M:  Targeted anatomy survey at 20 weeks (completed)  Serial growth ultrasounds q 4weeks (scheduled with [facility])    A fetal echocardiogram is recommended at 22-24 weeks with pediatric cardiology (completed)    Initiate  surveillance at 32 weeks:   Weekly BPP or NST + AFV if good control.   If control is poor, twice weekly  fetal surveillance is recommended with BPP alternating with NST   Delivery timin 0/7 to 38 and 6/7 weeks if under good control without comorbidities  37 0/7 to 37 and 67 weeks if longstanding diabetes or poorly controlled, polyhydramnios, EFW>90th percentile, or BMI >= 40  36 0/7 to 37 and 6/7 weeks if vascular complications, prior stillbirth or other complicating conditions.  Recommend consideration of earlier delivery if IUGR, HTN, or other complications  Recommend offering  for delivery is EFW is 4500g or more near the time of delivery    Insulin management during labor and delivery (if needed)  -IF AM ADMIT: Give usual dose of intermediate acting (NPH) or long-acting insulin at bedtime the night before admit  -Hold morning dose of insulin or reduce to ½ dose    -Patients who require insulin should be scheduled as the first available (7 am or 7:30 am)  given the need for NPO status  -Check glucose every 2 hours in latent labor; hourly in active labor  -If blood glucose is less than 70 mg/dl, change IVF to D5 ½ NS at rate of 100-150 cc/hr and monitor blood glucose hourly   -IV infusion of regular insulin is recommended if glucose levels exceed 120 mg/dl    Postpartum management  -Insulin should be reduced by 1/2 of the pre-delivery dose within the first 6-24 hours following delivery.  -Patients who were well-controlled on oral regimens can often return to these following delivery.  -Patients who are breastfeeding should be advised to have small snacks before breastfeeding to reduce the risk of hypoglycemia.  -Patients should be referred to primary MD or Endocrinology for postpartum management.    The patient was advised to call for blood sugars less than 70 or greater than 200 prior to her next appointment. She was also advised that if she notes nausea/vomiting, inability to tolerate PO, or concerns about being able to take her insulin that she should contact her provider or present to the OB ED.      - Multigravida of advanced maternal age in second trimester  Previously counseled the patient on the relationship between maternal age and fetal aneuploidy. Additionally, we discussed the association between advanced maternal age (AMA) and preeclampsia, gestational diabetes, and fetal growth restriction.    She completed a quad screen with low risk results.    Recommendations:  Detailed anatomic survey at 19-20 weeks (completed)          Constipation during pregnancy in second trimester  Discussed in detail. +hydrating, +moving well. Having upset stomach in the mornings also.    Discussed colace, miralax, fiber and stool under feet while emptying.         F/u in 4 weeks for MFM visit /growth ultrasound    Yulisa Melissa MD  Maternal Fetal Medicine

## 2024-07-02 ENCOUNTER — OFFICE VISIT (OUTPATIENT)
Dept: FAMILY MEDICINE | Facility: CLINIC | Age: 39
End: 2024-07-02
Payer: MEDICAID

## 2024-07-02 VITALS
SYSTOLIC BLOOD PRESSURE: 102 MMHG | TEMPERATURE: 98 F | HEART RATE: 90 BPM | OXYGEN SATURATION: 99 % | BODY MASS INDEX: 24.39 KG/M2 | DIASTOLIC BLOOD PRESSURE: 65 MMHG | HEIGHT: 67 IN | WEIGHT: 155.38 LBS

## 2024-07-02 DIAGNOSIS — O98.512 HERPES VIRUS INFECTION IN MOTHER DURING SECOND TRIMESTER OF PREGNANCY: ICD-10-CM

## 2024-07-02 DIAGNOSIS — O10.919 CHRONIC HYPERTENSION AFFECTING PREGNANCY: ICD-10-CM

## 2024-07-02 DIAGNOSIS — O09.299 HISTORY OF PRE-ECLAMPSIA IN PRIOR PREGNANCY, CURRENTLY PREGNANT: ICD-10-CM

## 2024-07-02 DIAGNOSIS — B00.9 HERPES VIRUS INFECTION IN MOTHER DURING SECOND TRIMESTER OF PREGNANCY: ICD-10-CM

## 2024-07-02 DIAGNOSIS — O24.415 GESTATIONAL DIABETES MELLITUS (GDM) IN SECOND TRIMESTER CONTROLLED ON ORAL HYPOGLYCEMIC DRUG: ICD-10-CM

## 2024-07-02 DIAGNOSIS — Z3A.27 27 WEEKS GESTATION OF PREGNANCY: Primary | ICD-10-CM

## 2024-07-02 LAB
BASOPHILS # BLD AUTO: 0.04 X10(3)/MCL
BASOPHILS NFR BLD AUTO: 0.4 %
BILIRUB SERPL-MCNC: NORMAL MG/DL
BLOOD URINE, POC: NORMAL
CLARITY, POC UA: CLEAR
COLOR, POC UA: YELLOW
EOSINOPHIL # BLD AUTO: 0.03 X10(3)/MCL (ref 0–0.9)
EOSINOPHIL NFR BLD AUTO: 0.3 %
ERYTHROCYTE [DISTWIDTH] IN BLOOD BY AUTOMATED COUNT: 12.9 % (ref 11.5–17)
GLUCOSE UR QL STRIP: NORMAL
HCT VFR BLD AUTO: 37 % (ref 37–47)
HGB BLD-MCNC: 12.1 G/DL (ref 12–16)
IMM GRANULOCYTES # BLD AUTO: 0.04 X10(3)/MCL (ref 0–0.04)
IMM GRANULOCYTES NFR BLD AUTO: 0.4 %
KETONES UR QL STRIP: NORMAL
LEUKOCYTE ESTERASE URINE, POC: NORMAL
LYMPHOCYTES # BLD AUTO: 1.85 X10(3)/MCL (ref 0.6–4.6)
LYMPHOCYTES NFR BLD AUTO: 17 %
MCH RBC QN AUTO: 28.1 PG (ref 27–31)
MCHC RBC AUTO-ENTMCNC: 32.7 G/DL (ref 33–36)
MCV RBC AUTO: 85.8 FL (ref 80–94)
MONOCYTES # BLD AUTO: 0.8 X10(3)/MCL (ref 0.1–1.3)
MONOCYTES NFR BLD AUTO: 7.3 %
NEUTROPHILS # BLD AUTO: 8.15 X10(3)/MCL (ref 2.1–9.2)
NEUTROPHILS NFR BLD AUTO: 74.6 %
NITRITE, POC UA: NORMAL
NRBC BLD AUTO-RTO: 0 %
PH, POC UA: 8.5
PLATELET # BLD AUTO: 250 X10(3)/MCL (ref 130–400)
PMV BLD AUTO: 10.4 FL (ref 7.4–10.4)
PROTEIN, POC: NORMAL
RBC # BLD AUTO: 4.31 X10(6)/MCL (ref 4.2–5.4)
SPECIFIC GRAVITY, POC UA: 1.01
T PALLIDUM AB SER QL: NONREACTIVE
UROBILINOGEN, POC UA: 0.2
WBC # BLD AUTO: 10.91 X10(3)/MCL (ref 4.5–11.5)

## 2024-07-02 PROCEDURE — 99213 OFFICE O/P EST LOW 20 MIN: CPT | Mod: PBBFAC

## 2024-07-02 PROCEDURE — 86780 TREPONEMA PALLIDUM: CPT

## 2024-07-02 PROCEDURE — 85025 COMPLETE CBC W/AUTO DIFF WBC: CPT

## 2024-07-02 PROCEDURE — 36415 COLL VENOUS BLD VENIPUNCTURE: CPT

## 2024-07-02 NOTE — PROGRESS NOTES
[facility] Note    Subjective:     Patient ID: Ruby Angel is a 39 y.o. female    Chief Complaint:   Chief Complaint   Patient presents with    Routine Prenatal Visit     OB 27w0d- ROUTINE PNV         HPI  40 yo G 4 P 1021 F at 27w0d WGA by Ultrasound (YOMI: 10/1/2024) presents for routine prenatal visit    Acute Concerns:  Shocking vaginal pain: occurred once yesterday,       Chronic Conditions:  - advanced maternal age  -pre-diabetes and early onset gest DM in prior pregnancy: Metformin 1500 mg daily  - Multinodular goiter  - HTN, chronic: no medication  - History of prediabetes/PCOS  - ADHD- adderall prior to pregnancy, not taking now  - Herpes Simplex type 2, last flare was February, when she first discovered she was pregnancy. Last took antiviral medication for it 5 years ago.   - Previous C section  - History of preeclampsia with severe features in prior pregnancy and required IV magnesium during labor and hospitalization.       OB/GYN History:  Gestational History  OB History    Para Term  AB Living   4 1 1 0 2 1   SAB IAB Ectopic Multiple Live Births   2 0 0 0 1      # Outcome Date GA Lbr Luis/2nd Weight Sex Type Anes PTL Lv   4 Current            3 Term 10/29/21 39w4d  3.09 kg (6 lb 13 oz) M CS-Unspec EPI N ESTUARDO      Complications: Failure to Progress in Second Stage   2 SAB 2018 10w0d    SAB         Birth Comments: D&C   1 SAB 2018 5w0d    SAB           History of  Section:  (If Yes, Classical or Low Transverse; How Many)  - Yes x 1    GYN Hx:  - LMP: 25  - Menarche: age 15  - Menstrual Hx: regular, 5 day cycles. 7 pads per day  - Hx of birth control: OCP  - Hx of STDs: HSV2  - History of Abnormal PAP: positive for HPV, ASCUS in 2016     ROS Antepartum Specific:   General: denies chest pain, N/V, shortness of breath, dysuria, odor  Fetal movements: yes  Vaginal bleeding: no  Vaginal discharge: no  Loss of fluid: no  Contractions: no  Headaches:  no  Vision changes: no  Edema: no      Objective:     Vitals:    07/02/24 0829   BP: 102/65   Pulse: 90   Temp: 98.2 °F (36.8 °C)       Physical Exam    General: Well developed, no acute distress  CV: Regular rate rhythm, no murmurs, no edema, 2+ peripheral pulses  Resp: Non-labored breathing, symmetrical chest expansion bilaterally  Abd: soft, gravid, non-tender to palpation, +BS    FH: 28 cm    FHT: 140s    Initial OB Labs: 3/12/24  - Blood Type and Rh: A positive  - CBC H/H: 13.5/39.5  - HIV: NR  - RPR: NR  - GC: Negative  - CT: Negative  - HBsAg: NR  - HCVAb: NR  - Rubella: Vaccinated  - Varicella: Vaccinated  - UA & Culture: No growth  - Sickle Cell Screen: Negative  - PAP: NIL; HR-HPV negative  - Influenza vaccine date: 03/12/2024       15-20 Weeks Lab:  - Quad Screen: normal risk    28 Week Lab: 7/2/24  - 1H GTT: not indicated  - Rhogam: not indicated  - Date of Tdap: 7/2/24  - CBC H/H: 12.1/37.0  - RPR: NR      37 Week Lab  - CBC H/H: _  - RPR: _  - GBS Culture: _  - HIV: _  - Urine: GC: _    Ultrasound  - Initial US: 3/12/24  Single viable intrauterine pregnancy with a crown-rump length indicative of an 11 week 1 day gestation.     - 20 wk anatomy US: 5/21/24  A detailed fetal anatomic ultrasound examination was performed for the following high risk indication: Diabetes.   No fetal structural malformations are identified; however, fetal imaging is incomplete today (posterior fossa, AA).   A follow-up study will be scheduled to complete the fetal anatomic survey.   Fetal size today is consistent with established gestational age.   Cervical length by TA scanning is normal.   Placental location is posterior without evidence of previa. A large subchorionic hematoma is noted       Assessment:     Problem List Items Addressed This Visit    None        Plan:       27 Weeks Gestation of Pregnancy  OB Protocol   Prenatal vitamins   Urine dip: reviewed   Routine (initial) labs: as above   Mother plans on breast  feeding   Postpartum contraception discussion: none  Labor precautions discussed in depth      Diabetes mellitus   Glucose logs scanned into media  Continue metformin and follow up with MFM    Chronic Hypertension  BP at goal, not on medications  Continue to log    Herpes simplex type 2  Suppressive therapy at 36 weeks  Contact clinic if outbreak occurs    RTC in 2 weeks    DO OLGA ChristopherU   PGY-II

## 2024-07-15 ENCOUNTER — TELEPHONE (OUTPATIENT)
Dept: MATERNAL FETAL MEDICINE | Facility: CLINIC | Age: 39
End: 2024-07-15
Payer: MEDICAID

## 2024-07-15 NOTE — TELEPHONE ENCOUNTER
Per Dr. Melissa, after reviewing pt's glucose log, increase Levemir from 10 units qam to 14 units qam.    I called spoke with pt informed pt of insulin dose adjustment of Levemir from 10 units to 14 units qam, pt verbalized understanding.

## 2024-07-16 ENCOUNTER — OFFICE VISIT (OUTPATIENT)
Dept: FAMILY MEDICINE | Facility: CLINIC | Age: 39
End: 2024-07-16
Payer: MEDICAID

## 2024-07-16 VITALS
SYSTOLIC BLOOD PRESSURE: 105 MMHG | HEART RATE: 94 BPM | OXYGEN SATURATION: 98 % | WEIGHT: 156.81 LBS | TEMPERATURE: 99 F | DIASTOLIC BLOOD PRESSURE: 70 MMHG | HEIGHT: 67 IN | BODY MASS INDEX: 24.61 KG/M2

## 2024-07-16 DIAGNOSIS — O09.299 HISTORY OF PRE-ECLAMPSIA IN PRIOR PREGNANCY, CURRENTLY PREGNANT: ICD-10-CM

## 2024-07-16 DIAGNOSIS — O10.919 CHRONIC HYPERTENSION AFFECTING PREGNANCY: ICD-10-CM

## 2024-07-16 DIAGNOSIS — O24.912 DIABETES MELLITUS AFFECTING PREGNANCY IN SECOND TRIMESTER: ICD-10-CM

## 2024-07-16 DIAGNOSIS — O09.522 MULTIGRAVIDA OF ADVANCED MATERNAL AGE IN SECOND TRIMESTER: ICD-10-CM

## 2024-07-16 DIAGNOSIS — O99.342 MENTAL DISORDER AFFECTING PREGNANCY IN SECOND TRIMESTER: ICD-10-CM

## 2024-07-16 DIAGNOSIS — Z3A.29 29 WEEKS GESTATION OF PREGNANCY: Primary | ICD-10-CM

## 2024-07-16 PROCEDURE — 81002 URINALYSIS NONAUTO W/O SCOPE: CPT | Mod: PBBFAC

## 2024-07-16 PROCEDURE — 99213 OFFICE O/P EST LOW 20 MIN: CPT | Mod: PBBFAC

## 2024-07-16 NOTE — PROGRESS NOTES
Subjective     Patient ID: Ruby Angel is a 39 y.o. female.    Chief Complaint:  Routine Prenatal Visit (OB 29w0d- ROUTINE PNV)      History of Present Illness  40 yo  F at 29w0d WGA by 11 week Ultrasound (YOMI: 10/1/2024) presents for routine prenatal visit.    Current Issues: None. Reports good fetal movement. Continues to check CBGs. Recent Levemir increase to 14 units by MFM. Has appointment next week with MFM. Compliant with medication. Also takes ASA.     Chronic Issues:  -AMA  -DM  -Multinodular goiter  -HTN, chronic  -History of prediabetes/PCOS  - ADHD  - Herpes Simplex  - Previous C section  - History of preeclampsia with severe features in prior pregnancy and required IV magnesium during labor and hospitalization    GYN & OB History  Patient's last menstrual period was 2023 (exact date).   Date of Last Pap: 3/18/2024    OB History    Para Term  AB Living   4 1 1   2 1   SAB IAB Ectopic Multiple Live Births   2       1      # Outcome Date GA Lbr Luis/2nd Weight Sex Type Anes PTL Lv   4 Current            3 Term 10/29/21 39w4d  3.09 kg (6 lb 13 oz) M CS-Unspec EPI N ESTUARDO      Complications: Failure to Progress in Second Stage   2 SAB 2018 10w0d    SAB         Birth Comments: D&C   1 SAB 2018 5w0d    SAB          Review of Systems  - Fetal movements: Yes  - Vaginal bleeding: No  - Vaginal discharge: No  - Loss of fluid: No  - Contractions: No  - Headaches: No  - Vision changes: No  - Edema: No           Objective   Physical Exam      Vitals:    24 0817   BP: 105/70   Pulse: 94   Temp: 98.6 °F (37 °C)   NAD, AXO  Unlabored breathing  No edema   bpm gravid abdomen       Assessment and Plan     1. 29 weeks gestation of pregnancy    2. - Diabetes affecting pregnancy in second trimester    3. History of pre-eclampsia in prior pregnancy, currently pregnant    4. - Anxiety, depression, ADHD, hx PPD affecting pregnancy in second trimester    5. - Multigravida of  advanced maternal age in second trimester    6. - Chronic hypertension affecting pregnancy            Plan:  OB Protocol  PNV, ASA  Urine dip pending  Routine labs reviewed  Continue glucose logs; submits weekly to [facility]  Mood is stable  Will need suppressive therapy 36 weeks

## 2024-07-22 ENCOUNTER — OFFICE VISIT (OUTPATIENT)
Dept: MATERNAL FETAL MEDICINE | Facility: CLINIC | Age: 39
End: 2024-07-22
Payer: MEDICAID

## 2024-07-22 ENCOUNTER — PROCEDURE VISIT (OUTPATIENT)
Dept: MATERNAL FETAL MEDICINE | Facility: CLINIC | Age: 39
End: 2024-07-22
Payer: MEDICAID

## 2024-07-22 VITALS
DIASTOLIC BLOOD PRESSURE: 70 MMHG | SYSTOLIC BLOOD PRESSURE: 117 MMHG | WEIGHT: 156.5 LBS | HEART RATE: 101 BPM | BODY MASS INDEX: 24.56 KG/M2 | HEIGHT: 67 IN

## 2024-07-22 DIAGNOSIS — O09.523 MULTIGRAVIDA OF ADVANCED MATERNAL AGE IN THIRD TRIMESTER: ICD-10-CM

## 2024-07-22 DIAGNOSIS — B00.9 HERPES VIRUS INFECTION IN MOTHER DURING THIRD TRIMESTER OF PREGNANCY: ICD-10-CM

## 2024-07-22 DIAGNOSIS — O99.342 MENTAL DISORDER AFFECTING PREGNANCY IN SECOND TRIMESTER: ICD-10-CM

## 2024-07-22 DIAGNOSIS — O24.913 DIABETES MELLITUS AFFECTING PREGNANCY IN THIRD TRIMESTER: ICD-10-CM

## 2024-07-22 DIAGNOSIS — O98.513 HERPES VIRUS INFECTION IN MOTHER DURING THIRD TRIMESTER OF PREGNANCY: ICD-10-CM

## 2024-07-22 DIAGNOSIS — O10.919 CHRONIC HYPERTENSION AFFECTING PREGNANCY: Primary | ICD-10-CM

## 2024-07-22 DIAGNOSIS — O99.343 MENTAL DISORDER AFFECTING PREGNANCY IN THIRD TRIMESTER: ICD-10-CM

## 2024-07-22 DIAGNOSIS — E04.2 MULTINODULAR GOITER: ICD-10-CM

## 2024-07-22 DIAGNOSIS — O24.912 DIABETES MELLITUS AFFECTING PREGNANCY IN SECOND TRIMESTER: ICD-10-CM

## 2024-07-22 PROCEDURE — 99214 OFFICE O/P EST MOD 30 MIN: CPT | Mod: TH,S$GLB,, | Performed by: OBSTETRICS & GYNECOLOGY

## 2024-07-22 PROCEDURE — 1159F MED LIST DOCD IN RCRD: CPT | Mod: CPTII,S$GLB,, | Performed by: OBSTETRICS & GYNECOLOGY

## 2024-07-22 PROCEDURE — 3078F DIAST BP <80 MM HG: CPT | Mod: CPTII,S$GLB,, | Performed by: OBSTETRICS & GYNECOLOGY

## 2024-07-22 PROCEDURE — 76816 OB US FOLLOW-UP PER FETUS: CPT | Mod: S$GLB,,, | Performed by: OBSTETRICS & GYNECOLOGY

## 2024-07-22 PROCEDURE — 3044F HG A1C LEVEL LT 7.0%: CPT | Mod: CPTII,S$GLB,, | Performed by: OBSTETRICS & GYNECOLOGY

## 2024-07-22 PROCEDURE — 3074F SYST BP LT 130 MM HG: CPT | Mod: CPTII,S$GLB,, | Performed by: OBSTETRICS & GYNECOLOGY

## 2024-07-22 PROCEDURE — 3008F BODY MASS INDEX DOCD: CPT | Mod: CPTII,S$GLB,, | Performed by: OBSTETRICS & GYNECOLOGY

## 2024-07-22 PROCEDURE — 1160F RVW MEDS BY RX/DR IN RCRD: CPT | Mod: CPTII,S$GLB,, | Performed by: OBSTETRICS & GYNECOLOGY

## 2024-07-22 RX ORDER — INSULIN ASPART 100 [IU]/ML
INJECTION, SOLUTION INTRAVENOUS; SUBCUTANEOUS
Qty: 12 ML | Refills: 1 | Status: SHIPPED | OUTPATIENT
Start: 2024-07-22

## 2024-07-22 RX ORDER — PEN NEEDLE, DIABETIC 31 GX5/16"
NEEDLE, DISPOSABLE MISCELLANEOUS
COMMUNITY
Start: 2024-07-11

## 2024-07-22 RX ORDER — INSULIN GLARGINE 100 [IU]/ML
INJECTION, SOLUTION SUBCUTANEOUS DAILY
COMMUNITY
Start: 2024-06-09

## 2024-07-22 NOTE — PROGRESS NOTES
"Maternal Fetal Medicine follow up consult    SUBJECTIVE:     Ruby Angel is a 39 y.o.  female with IUP at 29w6d who is seen in follow up consultation by MFM.  Pregnancy complications include:   Problem   - Multigravida of advanced maternal age in third trimester   - Diabetes mellitus affecting pregnancy in third trimester   - Herpes virus infection in mother during third trimester of pregnancy   - Multinodular goiter   -Anxiety, depression, ADHD, hx PPD affecting pregnancy in third trimester   - Chronic hypertension affecting pregnancy     Ruby presents for routine follow up appointment.  She has a sugar log with her for review. She is currently taking Metformin 1500mg and Levemir 14u QAM. BG are not well controlled after lunch and dinner and she elects to add regular insulin at those times as she feels hungry often also.   She is still having some constipation but not bothering her currently.  Denies LOF, VB, contractions. Positive fetal movement.    Previous notes reviewed.   No changes to medical, surgical, family, social, or obstetric history.  Interval history since last MFM visit: see above  Medications reviewed.    Care team members:  [facility] - Primary OB     OBJECTIVE:   /70 (BP Location: Right arm)   Pulse 101   Ht 5' 7" (1.702 m)   Wt 71 kg (156 lb 8.4 oz)   LMP 2023 (Exact Date)   BMI 24.52 kg/m²     Ultrasound performed. See viewpoint for full ultrasound report.    A viable corral pregnancy is visualized in cephalic presentation.  Estimated fetal weight is at the 45th percentile with an abdominal circumference at the 74th percentile.    No fetal abnormalities are noted and previous anatomic survey was normal. Amniotic fluid volume is normal.  Placenta is posterior.            ASSESSMENT/PLAN:     39 y.o.  female with IUP at 29w6d    - Chronic hypertension affecting pregnancy  She was initially diagnosed with cHTN approx 5 years ago and was on antihypertensives for a " year. She reports she was able to be weaned off of meds after that time. Denies any problems with Bps since then. Of note, she had pre-eclampsia with her prior pregnancy.  Prevouisly counseled the patient on maternal/fetal risks associated with CHTN during pregnancy. Risks include but not limited to fetal growth restriction, miscarriage, abruption, maternal end organ disease (renal failure, MI, and stroke),  delivery, development of superimposed preeclampsia, and eclampsia.     Recommendations ([facility] guidelines):  -Continue aspirin 81 mg daily for preeclampsia risk reduction (she is taking)  -No current medications  -Baseline evaluation with primary OB:   baseline P/C ratio (too low to calc), CMP, and CBC.  Maternal EKG  Maternal ophthalmic evaluation  Maternal echocardiogram if HTN has been long-standing or EKG is abnormal  -Serial fetal growth ultrasounds every 4-6 weeks, beginning at 26-28 weeks.   -Continued close observation of patient's blood pressures. Avoid hypotension as this has been associated with uteroplacental insufficiency.  -In conjunction with the CHAP study recommendation for BP control: If BP is persistently ?140/90 antihypertensive medication is recommended with goal BP of 120-140/80-90.  -Antepartum testing and delivery timing discussed in DM section.          -Anxiety, depression, ADHD, hx PPD affecting pregnancy in third trimester  She reports a history of anxiety, depression, ADHD, and hx PPD. She stopped Xanax and Adderall at 15w. She reports stable symptoms. Discussed increased risk for peripartum and postpartum mood disorders. Precautions provided.      We have discussed the importance of optimization of mental health conditions during pregnancy in an effort to reduce the risk of postpartum mood disorders. We have discussed options for management including psychotherapy, counseling, cognitive behavioral therapy, exercise/yoga, journaling, and psychiatry  services. She will notify our office if she is interested in being referred for any of the above.    : She is feeling well.    - Herpes virus infection in mother during third trimester of pregnancy  Maternal-fetal transmission of HSV is the major consequence of maternal HSV infection. The most common mode of transmission is via direct contact of the fetus with infected vaginal secretions during delivery.  Intrauterine transmission from transplacental or ascending infection also occurs rarely as evidenced by the presence of early  infection despite delivery by  delivery before both labor and rupture of fetal membranes.    Recommendations:  -Start daily suppressive therapy at 36 weeks: Acyclovir 400mg po TID or Valacyclovir 500mg po BID      - Multinodular goiter  She has a history of goiter. She denies abnormalities with TFTs and has never had to be on medication. Last time TFTs were evaluated were in 2023.    Recommendations  - TFTs. If normal, no further testing.  (TSH was normal)    - Diabetes mellitus affecting pregnancy in third trimester  She has a history of pre-diabetes, PCOS, and early onset GDM requiring insulin in her prior pregnancy. Most recent A1C at the beginning of pregnancy 5.3. She has not completed an early glucola, however, has been checking her sugars. Postprandial values out of range, therefore, counseling is as follows:  The patient was counseled regarding the risks of diabetes in pregnancy. These risks include but are not limited to an increased risk of , preeclampsia/gestational hypertension, fetal structural anomalies, macrosomia, prematurity, shoulder dystocia/birth injury,  hyperbilirubinemia and electrolyte issues, pulmonary immaturity and sudden stillbirth especially in those patients with poor glucose control.     She has her log with her today. She is on Metformin 1500mg and Levemir 14u QAM. She has a few postprandial values that are out  of range (mostly lunch/dinner time).     Recommendations:  Baseline evaluation (to be ordered by primary OB provider):  Urine P/C ratio, CBC, CMP (completed)  Maternal EKG; echocardiogram if BMI > 30 or EKG is abnormal  Maternal ophthalmic exam (if hasn't been performed in last year) and podiatry exam  Hemoglobin A1C every trimester (Baseline-5.3)  Diabetic education referral and counseling re: goal blood sugars (< 95 mg/dl for fasting, < 120 mg/dl for 2 hour postprandial)  Medications:   Continue low dose aspirin for preeclampsia risk reduction  1 mg folic acid daily  Regimen: Metformin 1500mg qAM, Levemir 14u QAM. Add Novolog 6u with lunch and dinner  She will submit her log to our office on a weekly basis via email or portal for review and management   Ultrasounds with M:  Targeted anatomy survey at 20 weeks (completed)  Serial growth ultrasounds q 4weeks (scheduled with [facility])    A fetal echocardiogram is recommended at 22-24 weeks with pediatric cardiology (completed)    Initiate  surveillance at 32 weeks:   Weekly BPP or NST + AFV if good control.   If control is poor, twice weekly  fetal surveillance is recommended with BPP alternating with NST   Delivery timin 0/7 to 38 and 6/7 weeks if under good control without comorbidities  37 0/7 to 37 and 67 weeks if longstanding diabetes or poorly controlled, polyhydramnios, EFW>90th percentile, or BMI >= 40  36 0/7 to 37 and 6/7 weeks if vascular complications, prior stillbirth or other complicating conditions.  Recommend consideration of earlier delivery if IUGR, HTN, or other complications  Recommend offering  for delivery is EFW is 4500g or more near the time of delivery    Insulin management during labor and delivery (if needed)  -IF AM ADMIT: Give usual dose of intermediate acting (NPH) or long-acting insulin at bedtime the night before admit  -Hold morning dose of insulin or reduce to ½ dose   -Patients who require insulin should  be scheduled as the first available (7 am or 7:30 am)  given the need for NPO status  -Check glucose every 2 hours in latent labor; hourly in active labor  -If blood glucose is less than 70 mg/dl, change IVF to D5 ½ NS at rate of 100-150 cc/hr and monitor blood glucose hourly   -IV infusion of regular insulin is recommended if glucose levels exceed 120 mg/dl    Postpartum management  -Insulin should be reduced by 1/2 of the pre-delivery dose within the first 6-24 hours following delivery.  -Patients who were well-controlled on oral regimens can often return to these following delivery.  -Patients who are breastfeeding should be advised to have small snacks before breastfeeding to reduce the risk of hypoglycemia.  -Patients should be referred to primary MD or Endocrinology for postpartum management.    The patient was advised to call for blood sugars less than 70 or greater than 200 prior to her next appointment. She was also advised that if she notes nausea/vomiting, inability to tolerate PO, or concerns about being able to take her insulin that she should contact her provider or present to the OB ED.      - Multigravida of advanced maternal age in third trimester  Previously counseled the patient on the relationship between maternal age and fetal aneuploidy. Additionally, we discussed the association between advanced maternal age (AMA) and preeclampsia, gestational diabetes, and fetal growth restriction.    She completed a quad screen with low risk results.    Recommendations:  Detailed anatomic survey at 19-20 weeks (completed)          F/u in 4 weeks for MFM visit /growth ultrasound    Yulisa Melissa MD  Maternal Fetal Medicine

## 2024-07-22 NOTE — ASSESSMENT & PLAN NOTE
She has a history of pre-diabetes, PCOS, and early onset GDM requiring insulin in her prior pregnancy. Most recent A1C at the beginning of pregnancy 5.3. She has not completed an early glucola, however, has been checking her sugars. Postprandial values out of range, therefore, counseling is as follows:  The patient was counseled regarding the risks of diabetes in pregnancy. These risks include but are not limited to an increased risk of , preeclampsia/gestational hypertension, fetal structural anomalies, macrosomia, prematurity, shoulder dystocia/birth injury,  hyperbilirubinemia and electrolyte issues, pulmonary immaturity and sudden stillbirth especially in those patients with poor glucose control.     She has her log with her today. She is on Metformin 1500mg and Levemir 14u QAM. She has a few postprandial values that are out of range (mostly lunch/dinner time).     Recommendations:  Baseline evaluation (to be ordered by primary OB provider):  Urine P/C ratio, CBC, CMP (completed)  Maternal EKG; echocardiogram if BMI > 30 or EKG is abnormal  Maternal ophthalmic exam (if hasn't been performed in last year) and podiatry exam  Hemoglobin A1C every trimester (Baseline-5.3)  Diabetic education referral and counseling re: goal blood sugars (< 95 mg/dl for fasting, < 120 mg/dl for 2 hour postprandial)  Medications:   Continue low dose aspirin for preeclampsia risk reduction  1 mg folic acid daily  Regimen: Metformin 1500mg qAM, Levemir 14u QAM. Add Novolog 6u with lunch and dinner  She will submit her log to our office on a weekly basis via email or portal for review and management   Ultrasounds with [facility]:  Targeted anatomy survey at 20 weeks (completed)  Serial growth ultrasounds q 4weeks (scheduled with [facility])    A fetal echocardiogram is recommended at 22-24 weeks with pediatric cardiology (completed)    Initiate  surveillance at 32 weeks:   Weekly BPP or NST + AFV if good control.   If control  is poor, twice weekly  fetal surveillance is recommended with BPP alternating with NST   Delivery timin 0/7 to 38 and 6/7 weeks if under good control without comorbidities  37 0/7 to 37 and 67 weeks if longstanding diabetes or poorly controlled, polyhydramnios, EFW>90th percentile, or BMI >= 40  36 0/7 to 37 and 6/7 weeks if vascular complications, prior stillbirth or other complicating conditions.  Recommend consideration of earlier delivery if IUGR, HTN, or other complications  Recommend offering  for delivery is EFW is 4500g or more near the time of delivery    Insulin management during labor and delivery (if needed)  -IF AM ADMIT: Give usual dose of intermediate acting (NPH) or long-acting insulin at bedtime the night before admit  -Hold morning dose of insulin or reduce to ½ dose   -Patients who require insulin should be scheduled as the first available (7 am or 7:30 am)  given the need for NPO status  -Check glucose every 2 hours in latent labor; hourly in active labor  -If blood glucose is less than 70 mg/dl, change IVF to D5 ½ NS at rate of 100-150 cc/hr and monitor blood glucose hourly   -IV infusion of regular insulin is recommended if glucose levels exceed 120 mg/dl    Postpartum management  -Insulin should be reduced by 1/2 of the pre-delivery dose within the first 6-24 hours following delivery.  -Patients who were well-controlled on oral regimens can often return to these following delivery.  -Patients who are breastfeeding should be advised to have small snacks before breastfeeding to reduce the risk of hypoglycemia.  -Patients should be referred to primary MD or Endocrinology for postpartum management.    The patient was advised to call for blood sugars less than 70 or greater than 200 prior to her next appointment. She was also advised that if she notes nausea/vomiting, inability to tolerate PO, or concerns about being able to take her insulin that she should contact  her provider or present to the OB ED.

## 2024-07-22 NOTE — ASSESSMENT & PLAN NOTE
She was initially diagnosed with cHTN approx 5 years ago and was on antihypertensives for a year. She reports she was able to be weaned off of meds after that time. Denies any problems with Bps since then. Of note, she had pre-eclampsia with her prior pregnancy.  Prevouisly counseled the patient on maternal/fetal risks associated with CHTN during pregnancy. Risks include but not limited to fetal growth restriction, miscarriage, abruption, maternal end organ disease (renal failure, MI, and stroke),  delivery, development of superimposed preeclampsia, and eclampsia.     Recommendations (Please refer to [facility] guidelines):  -Continue aspirin 81 mg daily for preeclampsia risk reduction (she is taking)  -No current medications  -Baseline evaluation with primary OB:   baseline P/C ratio (too low to calc), CMP, and CBC.  Maternal EKG  Maternal ophthalmic evaluation  Maternal echocardiogram if HTN has been long-standing or EKG is abnormal  -Serial fetal growth ultrasounds every 4-6 weeks, beginning at 26-28 weeks.   -Continued close observation of patient's blood pressures. Avoid hypotension as this has been associated with uteroplacental insufficiency.  -In conjunction with the CHAP study recommendation for BP control: If BP is persistently ?140/90 antihypertensive medication is recommended with goal BP of 120-140/80-90.  -Antepartum testing and delivery timing discussed in DM section.         No fever/chills   No photophobia/eye pain/changes in visio,   No ear pain/sore throat/dysphagia   + chest pain no palpitations  + SOB no cough/wheeze/stridor   No abdominal pain, No N/V/D  No dysuria/frequency/discharge   No neck/back pain,   No rash  No changes in neurological status/function.

## 2024-07-30 ENCOUNTER — OFFICE VISIT (OUTPATIENT)
Dept: FAMILY MEDICINE | Facility: CLINIC | Age: 39
End: 2024-07-30
Payer: MEDICAID

## 2024-07-30 VITALS
HEIGHT: 67 IN | OXYGEN SATURATION: 97 % | BODY MASS INDEX: 24.81 KG/M2 | WEIGHT: 158.06 LBS | SYSTOLIC BLOOD PRESSURE: 106 MMHG | RESPIRATION RATE: 18 BRPM | DIASTOLIC BLOOD PRESSURE: 73 MMHG | TEMPERATURE: 99 F | HEART RATE: 102 BPM

## 2024-07-30 DIAGNOSIS — Z3A.31 31 WEEKS GESTATION OF PREGNANCY: Primary | ICD-10-CM

## 2024-07-30 DIAGNOSIS — O24.415 GESTATIONAL DIABETES MELLITUS (GDM) IN SECOND TRIMESTER CONTROLLED ON ORAL HYPOGLYCEMIC DRUG: ICD-10-CM

## 2024-07-30 DIAGNOSIS — O10.919 CHRONIC HYPERTENSION AFFECTING PREGNANCY: ICD-10-CM

## 2024-07-30 DIAGNOSIS — Z86.19 HISTORY OF HERPES SIMPLEX INFECTION: ICD-10-CM

## 2024-07-30 PROCEDURE — 99213 OFFICE O/P EST LOW 20 MIN: CPT | Mod: PBBFAC

## 2024-07-30 PROCEDURE — 81002 URINALYSIS NONAUTO W/O SCOPE: CPT | Mod: PBBFAC

## 2024-07-30 NOTE — PROGRESS NOTES
I have personally reviewed the review of systems (ROS) and past, family and social histories (PFSH) documented above by the resident.  I have reviewed the care furnished by the resident during the encounter, including a review of the patient's medical history, the resident's findings on physical examination, diagnosis, and the treatment plan.  I participated in the management of the patient and was immediately available throughout the encounter.   I was physically present during all key portions of the service(s) provided with the resident.  Services were furnished in a primary care center located in the outpatient department of a [facility].

## 2024-07-30 NOTE — PROGRESS NOTES
[facility] OB OFFICE VISIT NOTE  Ruby Angel  [id number]  2024    Chief Complaint: Follow-up ([facility] f/u, 31w 0d, no new c/o today)      Ruby Angel is a 39 y.o. female   31w0d 10/1/2024, by Ultrasound presenting to [facility] for routine prenatal visit.    Current Issues: No complaints.  Saw MFM yesterday, today glucose log. Antiglycemic regimen optimized, now taking Lantus 18 units daily, NovoLog 6 units with lunch and dinner, and 1,500 mg of metformin daily.  Reports medication adherence and we will continue to keep BP and glucose logs.     Chronic Issues:  -AMA  -DM  -Multinodular goiter  -HTN, chronic  -History of prediabetes/PCOS  - ADHD  - Herpes Simplex  - Previous C section  - History of preeclampsia with severe features in prior pregnancy and required IV magnesium during labor and hospitalization    Gestational History:  (date, GA, length labor, BW, sex, type, anesthesia, place, complications)    OB History    Para Term  AB Living   4 1 1 0 2 1   SAB IAB Ectopic Multiple Live Births   2 0 0 0 1      # Outcome Date GA Lbr Luis/2nd Weight Sex Type Anes PTL Lv   4 Current            3 Term 10/29/21 39w4d  3.09 kg (6 lb 13 oz) M CS-Unspec EPI N ESTUARDO      Complications: Failure to Progress in Second Stage   2 SAB 2018 10w0d    SAB         Birth Comments: D&C   1 SAB 2018 5w0d    SAB        Patient's last menstrual period was 2023 (exact date).   Date of Last Pap: 3/18/2024    Surgical History:   Family History: Denies, personal medical hx as above in chronic conditions.  Social History: Denies  Medications: Metformin, Lantus, Novolog, ASA, PNV  [place] [facility] [place] [facility]    Review of Systems    Antepartum specific   - Fetal movements: yes  - Vaginal bleeding: no  - Vaginal discharge: no  - Loss of fluid: no  - Contractions: [place]  - Headaches: no  - Vision changes: no  - Edema: no    Blood pressure 106/73, pulse 102, temperature 98.5 °F (36.9 °C),  "temperature source Oral, resp. rate 18, height 5' 7" (1.702 m), weight 71.7 kg (158 lb 1.1 oz), last menstrual period 12/04/2023, SpO2 97%.   Physical Exam  Gen: NAD  CV: RRR, no murmurs. No LE edema.  Resp: CTAB, breathing non labored on room air.  Abd: GRAVID, non-tender  FHT: 143 by Doppler US ( position: about 10 - 15 cm to the right of the umbilicus)  Fundal Height: 31 cm    Current Medications:   Current Outpatient Medications   Medication Sig Dispense Refill    aspirin (ECOTRIN) 81 MG EC tablet Take 1 tablet (81 mg total) by mouth once daily. 30 tablet 0    BD ULTRA-FINE MINI PEN NEEDLE 31 gauge x 3/16" Ndle Inject into the skin.      blood sugar diagnostic ( BLOOD GLUCOSE TEST STRIP) Strp 1 each by Misc.(Non-Drug; Combo Route) route 4 (four) times daily. 100 each 3    insulin aspart U-100 (NOVOLOG FLEXPEN U-100 INSULIN) 100 unit/mL (3 mL) InPn pen Inject 6u with lunch and dinner each day 12 mL 1    lancets 33 gauge Misc Apply topically 2 (two) times daily.      LANTUS SOLOSTAR U-100 INSULIN 100 unit/mL (3 mL) InPn pen Inject into the skin once daily.      metFORMIN (GLUCOPHAGE) 1000 MG tablet Take 1.5 tablets (1,500 mg total) by mouth daily with breakfast. W/ DIABETIC SNACK 45 tablet 3    PNV,calcium 72/iron/folic acid (PRENATAL VITAMIN) Tab Take 1 tablet by mouth once daily. 30 tablet 2    TRUE METRIX GLUCOSE METER Misc USE AS INSTRUCTED       No current facility-administered medications for this visit.       Labs:  Urine dipstick:   Lab Results   Component Value Date    COLORU Yellow 07/30/2024    SPECGRAV 1.025 07/30/2024    PHUR 7.0 07/30/2024    WBCUR neg 07/30/2024    NITRITE neg 07/30/2024    PROTEINPOC neg 07/30/2024    GLUCOSEUR neg 07/30/2024    KETONESU neg 07/30/2024    UROBILINOGEN 0.2 07/30/2024    BILIRUBINPOC neg 07/30/2024    RBCUR neg 07/30/2024     Initial OB Labs: 3/12/24  - Blood Type and Rh: A positive  - CBC H/H: 13.5/39.5  - HIV: NR  - RPR: NR  - GC: Negative  - CT: Negative  - " HBsAg: NR  - HCVAb: NR  - Rubella: Vaccinated  - Varicella: Vaccinated  - UA & Culture: No growth  - Sickle Cell Screen: Negative  - PAP: NIL; HR-HPV negative  - Influenza vaccine date: 03/12/2024        15-20 Weeks Lab:  - Quad Screen: normal risk    28 Week Lab: 7/2/24  - 1H GTT: not indicated  - Rhogam: not indicated  - Date of Tdap: 7/2/24  - CBC H/H: 12.1/37.0  - RPR: NR      37 Week Lab  - CBC H/H: _  - RPR: _  - GBS Culture: _  - HIV: _  - Urine: GC: _    Ultrasound  - Initial US: 3/12/24  Single viable intrauterine pregnancy with a crown-rump length indicative of an 11 week 1 day gestation.     - 20 wk anatomy US: 5/21/24  A detailed fetal anatomic ultrasound examination was performed for the following high risk indication: Diabetes.   No fetal structural malformations are identified; however, fetal imaging is incomplete today (posterior fossa, AA).   A follow-up study will be scheduled to complete the fetal anatomic survey.   Fetal size today is consistent with established gestational age.   Cervical length by TA scanning is normal.   Placental location is posterior without evidence of previa. A large subchorionic hematoma is noted      Assessment:   1. 31 weeks gestation of pregnancy    2. Gestational diabetes mellitus (GDM) in second trimester controlled on oral hypoglycemic drug    3. - Chronic hypertension affecting pregnancy    4. History of herpes simplex infection      31 Weeks Gestation of Pregnancy  OB Protocol   Prenatal vitamins   Urine dip: reviewed   Routine (initial) labs: as above   Mother plans on breast feeding   Postpartum contraception discussion: undecided  Labor precautions discussed in depth  Postpartum contraception discussion:   Labor precautions discussed in depth  Continuity patient after delivery: Undecided  NST q weekly from 32 weeks.       Diabetes mellitus   Glucose logs scanned into media. Log reviewed. will continue to keep.  Continue Lantus 80 units daily, metformin 50 100  mg daily, NovoLog 6 units with lunch and dinner  Continue to follow up with M     Chronic Hypertension  BP at goal, not on medications  Didn't bring BP log. Encouraged to keep, present next visit     Herpes simplex type 2  Suppressive therapy at 36 weeks  Contact clinic if outbreak occurs      Orders Placed This Encounter   Procedures    POCT urine dipstick without microscope       Follow up in about 3 weeks (around 2024) for Pre- visit.  NST q weekly from next week.    Gus Milner MD, MBS  [facility] Resident, [facility]

## 2024-08-06 ENCOUNTER — CLINICAL SUPPORT (OUTPATIENT)
Dept: FAMILY MEDICINE | Facility: CLINIC | Age: 39
End: 2024-08-06
Payer: MEDICAID

## 2024-08-06 ENCOUNTER — HOSPITAL ENCOUNTER (EMERGENCY)
Facility: HOSPITAL | Age: 39
Discharge: HOME | End: 2024-08-06
Attending: SPECIALIST
Payer: MEDICAID

## 2024-08-06 VITALS
SYSTOLIC BLOOD PRESSURE: 109 MMHG | HEART RATE: 124 BPM | OXYGEN SATURATION: 97 % | HEIGHT: 67 IN | DIASTOLIC BLOOD PRESSURE: 74 MMHG | BODY MASS INDEX: 24.92 KG/M2 | WEIGHT: 158.81 LBS

## 2024-08-06 VITALS
RESPIRATION RATE: 17 BRPM | SYSTOLIC BLOOD PRESSURE: 124 MMHG | HEART RATE: 98 BPM | TEMPERATURE: 98 F | BODY MASS INDEX: 26.98 KG/M2 | DIASTOLIC BLOOD PRESSURE: 78 MMHG | HEIGHT: 64 IN | WEIGHT: 158 LBS

## 2024-08-06 DIAGNOSIS — Z3A.32 32 WEEKS GESTATION OF PREGNANCY: ICD-10-CM

## 2024-08-06 PROCEDURE — 99213 OFFICE O/P EST LOW 20 MIN: CPT | Mod: PBBFAC

## 2024-08-06 PROCEDURE — 99284 EMERGENCY DEPT VISIT MOD MDM: CPT | Mod: 25,27

## 2024-08-06 NOTE — ED PROVIDER NOTES
"       YOLANDA NOTE     Admit Date: 2024  YOLANDA Physician: Avel Peters  Primary OBGYN: /[facility]    Chief Complaint   Patient presents with    Decreased Fetal Movement     Pt c/o decreased fetal movement. Stated felt the baby move 30  minutes ago but it is less than normal.       Hospital Course:  Ruby Angel is a 39 y.o.  at 32w0d presents complaining of decreased fetal movement.  Patient was seen in the [facility] today a nonstress test was minimal.    This IUP is complicated by gestational diabetes requiring insulin.  Polycystic ovary disease.  See problem list..    Patient denies vaginal bleeding, leakage of fluid, contractions, headache, vision changes, RUQ pain, dysuria, fever, and nausea/vomiting.  Fetal Movement:  Fetal movement is now within normal limits.  She is feeling active movement at this time. .      /78   Pulse 98   Temp 98.2 °F (36.8 °C)   Resp 17   Ht 5' 4" (1.626 m)   Wt 71.7 kg (158 lb)   LMP 2023 (Exact Date)   BMI 27.12 kg/m²   Temp:  [98.2 °F (36.8 °C)] 98.2 °F (36.8 °C)  Pulse:  [] 98  Resp:  [17] 17  SpO2:  [97 %] 97 %  BP: (109-124)/(74-78) 124/78    General: in no apparent distress  Cardiovascular: regular rate and rhythm no murmurs  Respiratory: clear to auscultation, no wheezes, rales or rhonchi, symmetric air entry  Abdominal: FHT present  Back: lumbar tenderness present CVA tenderness none  Extremeties no redness or tenderness in the calves or thighs no edema    SSE:   SVE:  Deferred      FHT: Category 1  TOCO:  No contractions noted    Medical Decision Making:  Patient will be kept on continuous fetal monitoring for reactive nonstress test.  Biophysical profile will also be obtained.    LABS:   No results found for this or any previous visit (from the past 24 hour(s)).  [unfilled]     Imaging Results              US OB 14+ Weeks TransAbd, w/Biophysical Profile, w/o NST, Single Gestation (xpd) (In process)  Result " "time 24 11:39:14                Biophysical profile      ASSESMENT: Ruby Angel is a 39 y.o.   at 32w0d with decreased fetal movement now resolved.  Reassuring antepartum testing.    Discharge Diagnosis/Clinical Impression:  Pregnancy 32 weeks.  Gestational diabetes.  Decreased fetal movement.    Status:Improved/stable    Disposition:  discharged to home    Medications:       Patient Instructions:   Current Discharge Medication List        CONTINUE these medications which have NOT CHANGED    Details   aspirin (ECOTRIN) 81 MG EC tablet Take 1 tablet (81 mg total) by mouth once daily.  Qty: 30 tablet, Refills: 0    Associated Diagnoses: Hx of preeclampsia, prior pregnancy, currently pregnant, second trimester      metFORMIN (GLUCOPHAGE) 1000 MG tablet Take 1.5 tablets (1,500 mg total) by mouth daily with breakfast. W/ DIABETIC SNACK  Qty: 45 tablet, Refills: 3    Associated Diagnoses: Gestational diabetes mellitus (GDM) in second trimester controlled on oral hypoglycemic drug      PNV,calcium 72/iron/folic acid (PRENATAL VITAMIN) Tab Take 1 tablet by mouth once daily.  Qty: 30 tablet, Refills: 2      BD ULTRA-FINE MINI PEN NEEDLE 31 gauge x 3/16" Ndle Inject into the skin.      blood sugar diagnostic ( BLOOD GLUCOSE TEST STRIP) Strp 1 each by Misc.(Non-Drug; Combo Route) route 4 (four) times daily.  Qty: 100 each, Refills: 3    Comments: Whichever is covered by patient's insurance and matches patient's glucometer.  Associated Diagnoses: 19 weeks gestation of pregnancy      insulin aspart U-100 (NOVOLOG FLEXPEN U-100 INSULIN) 100 unit/mL (3 mL) InPn pen Inject 6u with lunch and dinner each day  Qty: 12 mL, Refills: 1    Associated Diagnoses: Diabetes mellitus affecting pregnancy in third trimester      lancets 33 gauge Misc Apply topically 2 (two) times daily.      LANTUS SOLOSTAR U-100 INSULIN 100 unit/mL (3 mL) InPn pen Inject into the skin once daily.      TRUE METRIX GLUCOSE METER Misc USE " AS INSTRUCTED             - Pt was given routine pregnancy instructions including to return to triage if she had any vaginal bleeding (other than spotting for the next 48hrs), any loss of fluid like her water broke, decreased fetal movement, or contractions Q 5min lasting for 2 or more hours. Pt was also instructed to drink copious water. Patient voiced understanding of all these instructions and was subsequently discharged home.    She will follow up with her primary OB.    No discharge procedures on file.    Avel Peters MD  OB-GYN Hospitalist       Avel Peters MD  08/06/24 [id number]

## 2024-08-13 ENCOUNTER — CLINICAL SUPPORT (OUTPATIENT)
Dept: FAMILY MEDICINE | Facility: CLINIC | Age: 39
End: 2024-08-13
Payer: MEDICAID

## 2024-08-13 VITALS
HEART RATE: 104 BPM | SYSTOLIC BLOOD PRESSURE: 108 MMHG | OXYGEN SATURATION: 98 % | RESPIRATION RATE: 16 BRPM | DIASTOLIC BLOOD PRESSURE: 70 MMHG | TEMPERATURE: 99 F

## 2024-08-13 DIAGNOSIS — Z3A.33 33 WEEKS GESTATION OF PREGNANCY: Primary | ICD-10-CM

## 2024-08-13 PROCEDURE — 99213 OFFICE O/P EST LOW 20 MIN: CPT | Mod: PBBFAC

## 2024-08-13 NOTE — PROGRESS NOTES
Pt placed on NST monitor x20+ minutes. Strip images sent to Dr. Napier and shown in clinic to Dr. Diaz. OK given to discharge patient home. Pt aware of next week's follow up appt.

## 2024-08-14 DIAGNOSIS — O99.343 MENTAL DISORDER AFFECTING PREGNANCY IN THIRD TRIMESTER: ICD-10-CM

## 2024-08-14 DIAGNOSIS — O10.919 CHRONIC HYPERTENSION AFFECTING PREGNANCY: Primary | ICD-10-CM

## 2024-08-19 ENCOUNTER — OFFICE VISIT (OUTPATIENT)
Dept: MATERNAL FETAL MEDICINE | Facility: CLINIC | Age: 39
End: 2024-08-19
Payer: MEDICAID

## 2024-08-19 ENCOUNTER — PROCEDURE VISIT (OUTPATIENT)
Dept: MATERNAL FETAL MEDICINE | Facility: CLINIC | Age: 39
End: 2024-08-19
Payer: MEDICAID

## 2024-08-19 VITALS
HEIGHT: 64 IN | DIASTOLIC BLOOD PRESSURE: 72 MMHG | SYSTOLIC BLOOD PRESSURE: 121 MMHG | WEIGHT: 161.25 LBS | HEART RATE: 106 BPM | BODY MASS INDEX: 27.53 KG/M2

## 2024-08-19 DIAGNOSIS — O24.913 DIABETES MELLITUS AFFECTING PREGNANCY IN THIRD TRIMESTER: Primary | ICD-10-CM

## 2024-08-19 DIAGNOSIS — E04.2 MULTINODULAR GOITER: ICD-10-CM

## 2024-08-19 DIAGNOSIS — O99.343 MENTAL DISORDER AFFECTING PREGNANCY IN THIRD TRIMESTER: ICD-10-CM

## 2024-08-19 DIAGNOSIS — O10.919 CHRONIC HYPERTENSION AFFECTING PREGNANCY: ICD-10-CM

## 2024-08-19 DIAGNOSIS — O09.523 MULTIGRAVIDA OF ADVANCED MATERNAL AGE IN THIRD TRIMESTER: ICD-10-CM

## 2024-08-19 DIAGNOSIS — B00.9 HERPES VIRUS INFECTION IN MOTHER DURING THIRD TRIMESTER OF PREGNANCY: ICD-10-CM

## 2024-08-19 DIAGNOSIS — O98.513 HERPES VIRUS INFECTION IN MOTHER DURING THIRD TRIMESTER OF PREGNANCY: ICD-10-CM

## 2024-08-19 PROCEDURE — 99213 OFFICE O/P EST LOW 20 MIN: CPT | Mod: TH,S$GLB,, | Performed by: OBSTETRICS & GYNECOLOGY

## 2024-08-19 PROCEDURE — 3078F DIAST BP <80 MM HG: CPT | Mod: CPTII,S$GLB,, | Performed by: OBSTETRICS & GYNECOLOGY

## 2024-08-19 PROCEDURE — 1159F MED LIST DOCD IN RCRD: CPT | Mod: CPTII,S$GLB,, | Performed by: OBSTETRICS & GYNECOLOGY

## 2024-08-19 PROCEDURE — 3074F SYST BP LT 130 MM HG: CPT | Mod: CPTII,S$GLB,, | Performed by: OBSTETRICS & GYNECOLOGY

## 2024-08-19 PROCEDURE — 3044F HG A1C LEVEL LT 7.0%: CPT | Mod: CPTII,S$GLB,, | Performed by: OBSTETRICS & GYNECOLOGY

## 2024-08-19 PROCEDURE — 76816 OB US FOLLOW-UP PER FETUS: CPT | Mod: S$GLB,,, | Performed by: OBSTETRICS & GYNECOLOGY

## 2024-08-19 PROCEDURE — 3008F BODY MASS INDEX DOCD: CPT | Mod: CPTII,S$GLB,, | Performed by: OBSTETRICS & GYNECOLOGY

## 2024-08-19 PROCEDURE — 76819 FETAL BIOPHYS PROFIL W/O NST: CPT | Mod: S$GLB,,, | Performed by: OBSTETRICS & GYNECOLOGY

## 2024-08-19 PROCEDURE — 1160F RVW MEDS BY RX/DR IN RCRD: CPT | Mod: CPTII,S$GLB,, | Performed by: OBSTETRICS & GYNECOLOGY

## 2024-08-19 NOTE — ASSESSMENT & PLAN NOTE
She was initially diagnosed with cHTN approx 5 years ago and was on antihypertensives for a year. She reports she was able to be weaned off of meds after that time. Denies any problems with Bps since then. Of note, she had pre-eclampsia with her prior pregnancy.  Prevouisly counseled the patient on maternal/fetal risks associated with CHTN during pregnancy. Risks include but not limited to fetal growth restriction, miscarriage, abruption, maternal end organ disease (renal failure, MI, and stroke),  delivery, development of superimposed preeclampsia, and eclampsia.     Recommendations (Please refer to [facility] guidelines):  -Continue aspirin 81 mg daily for preeclampsia risk reduction (she is taking)  -No current medications  -Baseline evaluation with primary OB:   baseline P/C ratio (too low to calc), CMP, and CBC.  Maternal EKG  Maternal ophthalmic evaluation  Maternal echocardiogram if HTN has been long-standing or EKG is abnormal  -Serial fetal growth ultrasounds every 4-6 weeks, beginning at 26-28 weeks.   -Continued close observation of patient's blood pressures. Avoid hypotension as this has been associated with uteroplacental insufficiency.  -In conjunction with the CHAP study recommendation for BP control: If BP is persistently ?140/90 antihypertensive medication is recommended with goal BP of 120-140/80-90.  -Antepartum testing and delivery timing discussed in DM section.

## 2024-08-19 NOTE — PROGRESS NOTES
"Maternal Fetal Medicine follow up consult    SUBJECTIVE:     Ruby Angel is a 39 y.o.  female with IUP at 33w6d who is seen in follow up consultation by MFM.  Pregnancy complications include:   Problem   - Multigravida of advanced maternal age in third trimester   - Diabetes mellitus affecting pregnancy in third trimester   - Herpes virus infection in mother during third trimester of pregnancy   - Multinodular goiter   -Anxiety, depression, ADHD, hx PPD affecting pregnancy in third trimester   - Chronic hypertension affecting pregnancy     Ruby presents for routine follow up appointment.  She has been submitting her log routinely for review and presents with it today. She has been working hard and the addition of meal time insulin has improved her level of control.   Some nausea, aches and pains but tolerable.  Denies LOF, VB, contractions. Positive fetal movement.    Previous notes reviewed.   No changes to medical, surgical, family, social, or obstetric history.  Interval history since last MFM visit: see above  Medications reviewed.    Care team members:  [facility] - Primary OB     OBJECTIVE:   /72 (BP Location: Right arm)   Pulse 106   Ht 5' 4" (1.626 m)   Wt 73.1 kg (161 lb 4.3 oz)   LMP 2023 (Exact Date)   BMI 27.68 kg/m²     Ultrasound performed. See viewpoint for full ultrasound report.    A viable corral pregnancy is visualized in cephalic presentation.  Estimated fetal weight is at the 31st percentile with an abdominal circumference at the 88th percentile.    No fetal abnormalities are noted and previous anatomic survey was normal. Amniotic fluid volume is normal.  Placenta is posterior. Biophysical profile is 8/8.       ASSESSMENT/PLAN:     39 y.o.  female with IUP at 33w6d    - Diabetes mellitus affecting pregnancy in third trimester  She has a history of pre-diabetes, PCOS, and early onset GDM requiring insulin in her prior pregnancy. Most recent A1C at the beginning " of pregnancy 5.3. She has not completed an early glucola, however, has been checking her sugars. Postprandial values out of range, therefore, counseling is as follows:  The patient was counseled regarding the risks of diabetes in pregnancy. These risks include but are not limited to an increased risk of , preeclampsia/gestational hypertension, fetal structural anomalies, macrosomia, prematurity, shoulder dystocia/birth injury,  hyperbilirubinemia and electrolyte issues, pulmonary immaturity and sudden stillbirth especially in those patients with poor glucose control.     She has her log with her today. She is on Metformin 1500mg QAM; Levemir 18u QAM and 6u QHS; Novolog 10u with lunch and 12u with dinner. Values improved.     Recommendations:  Baseline evaluation (to be ordered by primary OB provider):  Urine P/C ratio, CBC, CMP (completed)  Maternal EKG; echocardiogram if BMI > 30 or EKG is abnormal  Maternal ophthalmic exam (if hasn't been performed in last year) and podiatry exam  Hemoglobin A1C every trimester (Baseline-5.3)  Diabetic education referral and counseling re: goal blood sugars (< 95 mg/dl for fasting, < 120 mg/dl for 2 hour postprandial)  Medications:   Continue low dose aspirin for preeclampsia risk reduction  1 mg folic acid daily  Regimen: Metformin 1500mg qAM; Levemir 18u QAM and 6u QHS; Novolog 10u with lunch and 12u with dinner  She will submit her log to our office on a weekly basis via email or portal for review and management   Ultrasounds with [facility]:  Targeted anatomy survey at 20 weeks (completed)  Serial growth ultrasounds q 4weeks (scheduled with [facility])    A fetal echocardiogram is recommended at 22-24 weeks with pediatric cardiology (completed)    Initiate  surveillance at 32 weeks:   Weekly BPP or NST + AFV if good control.   If control is poor, twice weekly  fetal surveillance is recommended with BPP alternating with NST   Delivery timin 0/7 to 38  and 6/7 weeks if under good control without comorbidities  37 0/7 to 37 and 67 weeks if longstanding diabetes or poorly controlled, polyhydramnios, EFW>90th percentile, or BMI >= 40  36 0/7 to 37 and 6/7 weeks if vascular complications, prior stillbirth or other complicating conditions.  Recommend consideration of earlier delivery if IUGR, HTN, or other complications  Recommend offering  for delivery is EFW is 4500g or more near the time of delivery    Insulin management during labor and delivery (if needed)  -IF AM ADMIT: Give usual dose of intermediate acting (NPH) or long-acting insulin at bedtime the night before admit  -Hold morning dose of insulin or reduce to ½ dose   -Patients who require insulin should be scheduled as the first available (7 am or 7:30 am)  given the need for NPO status  -Check glucose every 2 hours in latent labor; hourly in active labor  -If blood glucose is less than 70 mg/dl, change IVF to D5 ½ NS at rate of 100-150 cc/hr and monitor blood glucose hourly   -IV infusion of regular insulin is recommended if glucose levels exceed 120 mg/dl    Postpartum management  -Insulin should be reduced by 1/2 of the pre-delivery dose within the first 6-24 hours following delivery.  -Patients who were well-controlled on oral regimens can often return to these following delivery.  -Patients who are breastfeeding should be advised to have small snacks before breastfeeding to reduce the risk of hypoglycemia.  -Patients should be referred to primary MD or Endocrinology for postpartum management.    The patient was advised to call for blood sugars less than 70 or greater than 200 prior to her next appointment. She was also advised that if she notes nausea/vomiting, inability to tolerate PO, or concerns about being able to take her insulin that she should contact her provider or present to the OB ED.      - Multigravida of advanced maternal age in third trimester  Previously counseled the  patient on the relationship between maternal age and fetal aneuploidy. Additionally, we discussed the association between advanced maternal age (AMA) and preeclampsia, gestational diabetes, and fetal growth restriction.    She completed a quad screen with low risk results.    Recommendations:  Detailed anatomic survey at 19-20 weeks (completed)          - Multinodular goiter  She has a history of goiter. She denies abnormalities with TFTs and has never had to be on medication. Last time TFTs were evaluated were in 2023.    Recommendations  - TFTs. If normal, no further testing.  (TSH was normal)    - Herpes virus infection in mother during third trimester of pregnancy  Maternal-fetal transmission of HSV is the major consequence of maternal HSV infection. The most common mode of transmission is via direct contact of the fetus with infected vaginal secretions during delivery.  Intrauterine transmission from transplacental or ascending infection also occurs rarely as evidenced by the presence of early  infection despite delivery by  delivery before both labor and rupture of fetal membranes.    Recommendations:  -Start daily suppressive therapy at 36 weeks: Acyclovir 400mg po TID or Valacyclovir 500mg po BID      - Chronic hypertension affecting pregnancy  She was initially diagnosed with cHTN approx 5 years ago and was on antihypertensives for a year. She reports she was able to be weaned off of meds after that time. Denies any problems with Bps since then. Of note, she had pre-eclampsia with her prior pregnancy.  Prevouisly counseled the patient on maternal/fetal risks associated with CHTN during pregnancy. Risks include but not limited to fetal growth restriction, miscarriage, abruption, maternal end organ disease (renal failure, MI, and stroke),  delivery, development of superimposed preeclampsia, and eclampsia.     Recommendations (Please refer to [facility]  guidelines):  -Continue aspirin 81 mg daily for preeclampsia risk reduction (she is taking)  -No current medications  -Baseline evaluation with primary OB:   baseline P/C ratio (too low to calc), CMP, and CBC.  Maternal EKG  Maternal ophthalmic evaluation  Maternal echocardiogram if HTN has been long-standing or EKG is abnormal  -Serial fetal growth ultrasounds every 4-6 weeks, beginning at 26-28 weeks.   -Continued close observation of patient's blood pressures. Avoid hypotension as this has been associated with uteroplacental insufficiency.  -In conjunction with the CHAP study recommendation for BP control: If BP is persistently ?140/90 antihypertensive medication is recommended with goal BP of 120-140/80-90.  -Antepartum testing and delivery timing discussed in DM section.          -Anxiety, depression, ADHD, hx PPD affecting pregnancy in third trimester  She reports a history of anxiety, depression, ADHD, and hx PPD. She stopped Xanax and Adderall at 15w. She reports stable symptoms. Discussed increased risk for peripartum and postpartum mood disorders. Precautions provided.      We have discussed the importance of optimization of mental health conditions during pregnancy in an effort to reduce the risk of postpartum mood disorders. We have discussed options for management including psychotherapy, counseling, cognitive behavioral therapy, exercise/yoga, journaling, and psychiatry services. She will notify our office if she is interested in being referred for any of the above.    5/27: She is feeling well.    No further appts scheduled. Continue weekly NST until delivery.    Yulisa Melissa MD  Maternal Fetal Medicine

## 2024-08-19 NOTE — ASSESSMENT & PLAN NOTE
She has a history of pre-diabetes, PCOS, and early onset GDM requiring insulin in her prior pregnancy. Most recent A1C at the beginning of pregnancy 5.3. She has not completed an early glucola, however, has been checking her sugars. Postprandial values out of range, therefore, counseling is as follows:  The patient was counseled regarding the risks of diabetes in pregnancy. These risks include but are not limited to an increased risk of , preeclampsia/gestational hypertension, fetal structural anomalies, macrosomia, prematurity, shoulder dystocia/birth injury,  hyperbilirubinemia and electrolyte issues, pulmonary immaturity and sudden stillbirth especially in those patients with poor glucose control.     She has her log with her today. She is on Metformin 1500mg QAM; Levemir 18u QAM and 6u QHS; Novolog 10u with lunch and 12u with dinner. Values improved.     Recommendations:  Baseline evaluation (to be ordered by primary OB provider):  Urine P/C ratio, CBC, CMP (completed)  Maternal EKG; echocardiogram if BMI > 30 or EKG is abnormal  Maternal ophthalmic exam (if hasn't been performed in last year) and podiatry exam  Hemoglobin A1C every trimester (Baseline-5.3)  Diabetic education referral and counseling re: goal blood sugars (< 95 mg/dl for fasting, < 120 mg/dl for 2 hour postprandial)  Medications:   Continue low dose aspirin for preeclampsia risk reduction  1 mg folic acid daily  Regimen: Metformin 1500mg qAM; Levemir 18u QAM and 6u QHS; Novolog 10u with lunch and 12u with dinner  She will submit her log to our office on a weekly basis via email or portal for review and management   Ultrasounds with [facility]:  Targeted anatomy survey at 20 weeks (completed)  Serial growth ultrasounds q 4weeks (scheduled with [facility])    A fetal echocardiogram is recommended at 22-24 weeks with pediatric cardiology (completed)    Initiate  surveillance at 32 weeks:   Weekly BPP or NST + AFV if good control.   If  control is poor, twice weekly  fetal surveillance is recommended with BPP alternating with NST   Delivery timin 0/7 to 38 and 6/7 weeks if under good control without comorbidities  37 0/7 to 37 and 67 weeks if longstanding diabetes or poorly controlled, polyhydramnios, EFW>90th percentile, or BMI >= 40  36 0/7 to 37 and 6/7 weeks if vascular complications, prior stillbirth or other complicating conditions.  Recommend consideration of earlier delivery if IUGR, HTN, or other complications  Recommend offering  for delivery is EFW is 4500g or more near the time of delivery    Insulin management during labor and delivery (if needed)  -IF AM ADMIT: Give usual dose of intermediate acting (NPH) or long-acting insulin at bedtime the night before admit  -Hold morning dose of insulin or reduce to ½ dose   -Patients who require insulin should be scheduled as the first available (7 am or 7:30 am)  given the need for NPO status  -Check glucose every 2 hours in latent labor; hourly in active labor  -If blood glucose is less than 70 mg/dl, change IVF to D5 ½ NS at rate of 100-150 cc/hr and monitor blood glucose hourly   -IV infusion of regular insulin is recommended if glucose levels exceed 120 mg/dl    Postpartum management  -Insulin should be reduced by 1/2 of the pre-delivery dose within the first 6-24 hours following delivery.  -Patients who were well-controlled on oral regimens can often return to these following delivery.  -Patients who are breastfeeding should be advised to have small snacks before breastfeeding to reduce the risk of hypoglycemia.  -Patients should be referred to primary MD or Endocrinology for postpartum management.    The patient was advised to call for blood sugars less than 70 or greater than 200 prior to her next appointment. She was also advised that if she notes nausea/vomiting, inability to tolerate PO, or concerns about being able to take her insulin that she should  contact her provider or present to the OB ED.

## 2024-08-20 ENCOUNTER — OFFICE VISIT (OUTPATIENT)
Dept: FAMILY MEDICINE | Facility: CLINIC | Age: 39
End: 2024-08-20
Payer: MEDICAID

## 2024-08-20 VITALS
WEIGHT: 161 LBS | TEMPERATURE: 98 F | HEART RATE: 110 BPM | HEIGHT: 64 IN | SYSTOLIC BLOOD PRESSURE: 117 MMHG | RESPIRATION RATE: 16 BRPM | BODY MASS INDEX: 27.49 KG/M2 | DIASTOLIC BLOOD PRESSURE: 78 MMHG | OXYGEN SATURATION: 96 %

## 2024-08-20 DIAGNOSIS — F32.A ANXIETY AND DEPRESSION: ICD-10-CM

## 2024-08-20 DIAGNOSIS — O10.919 CHRONIC HYPERTENSION AFFECTING PREGNANCY: ICD-10-CM

## 2024-08-20 DIAGNOSIS — F41.9 ANXIETY AND DEPRESSION: ICD-10-CM

## 2024-08-20 DIAGNOSIS — O24.414 INSULIN CONTROLLED GESTATIONAL DIABETES MELLITUS (GDM) IN THIRD TRIMESTER: ICD-10-CM

## 2024-08-20 DIAGNOSIS — Z3A.34 34 WEEKS GESTATION OF PREGNANCY: Primary | ICD-10-CM

## 2024-08-20 LAB
BILIRUB SERPL-MCNC: NORMAL MG/DL
BLOOD URINE, POC: NORMAL
CLARITY, POC UA: NORMAL
COLOR, POC UA: YELLOW
GLUCOSE UR QL STRIP: NORMAL
KETONES UR QL STRIP: NORMAL
LEUKOCYTE ESTERASE URINE, POC: NORMAL
NITRITE, POC UA: NORMAL
PH, POC UA: 7
PROTEIN, POC: NORMAL
SPECIFIC GRAVITY, POC UA: 1.02
UROBILINOGEN, POC UA: 0.2

## 2024-08-20 PROCEDURE — 81002 URINALYSIS NONAUTO W/O SCOPE: CPT | Mod: PBBFAC

## 2024-08-20 PROCEDURE — 99214 OFFICE O/P EST MOD 30 MIN: CPT | Mod: PBBFAC

## 2024-08-20 NOTE — PROGRESS NOTES
[facility] Family Medicine OB Clinic Note    Subjective:     Patient ID: Ruby Angel is a 39 y.o. female    Chief Complaint:   Chief Complaint   Patient presents with    Routine Prenatal Visit     HROB 34w0d, based on US. No complaints.       HPI  40 yo G 4 P 1021 F at 34w0d WGA by Ultrasound (YOMI: 10/1/2024) presents for routine prenatal visit    Acute Concerns: none      Chronic Conditions:  - advanced maternal age  -pre-diabetes and early onset gest DM in prior pregnancy: Metformin 1500 mg daily  - Multinodular goiter  - HTN, chronic: no medication  - History of prediabetes/PCOS  - ADHD- adderall prior to pregnancy, not taking now  - Herpes Simplex type 2, last flare was February, when she first discovered she was pregnancy. Last took antiviral medication for it 5 years ago.   - Previous C section  - History of preeclampsia with severe features in prior pregnancy and required IV magnesium during labor and hospitalization.       OB/GYN History:  Gestational History  OB History    Para Term  AB Living   4 1 1 0 2 1   SAB IAB Ectopic Multiple Live Births   2 0 0 0 1      # Outcome Date GA Lbr Luis/2nd Weight Sex Type Anes PTL Lv   4 Current            3 Term 10/29/21 39w4d  3.09 kg (6 lb 13 oz) M CS-Unspec EPI N ESTUARDO      Complications: Failure to Progress in Second Stage   2 SAB 2018 10w0d    SAB         Birth Comments: D&C   1 SAB 2018 5w0d    SAB           History of  Section:  (If Yes, Classical or Low Transverse; How Many)  - Yes x 1    GYN Hx:  - LMP: 25  - Menarche: age 15  - Menstrual Hx: regular, 5 day cycles. 7 pads per day  - Hx of birth control: OCP  - Hx of STDs: HSV2  - History of Abnormal PAP: positive for HPV, ASCUS in 2016     ROS Antepartum Specific:   General: denies chest pain, N/V, shortness of breath, dysuria, odor  Fetal movements: yes  Vaginal bleeding: no  Vaginal discharge: no  Loss of fluid: no  Contractions: no  Headaches: no  Vision changes: no  Edema:  no      Objective:     Vitals:    08/20/24 0747   BP: 117/78   Pulse: 110   Resp: 16   Temp: 98.4 °F (36.9 °C)       Physical Exam    General: Well developed, no acute distress  CV: Regular rate rhythm, no murmurs, no edema, 2+ peripheral pulses  Resp: Non-labored breathing, symmetrical chest expansion bilaterally  Abd: soft, gravid, non-tender to palpation, +BS    FH: 34 cm    FHT: 130s    Initial OB Labs: 3/12/24  - Blood Type and Rh: A positive  - CBC H/H: 13.5/39.5  - HIV: NR  - RPR: NR  - GC: Negative  - CT: Negative  - HBsAg: NR  - HCVAb: NR  - Rubella: Vaccinated  - Varicella: Vaccinated  - UA & Culture: No growth  - Sickle Cell Screen: Negative  - PAP: NIL; HR-HPV negative  - Influenza vaccine date: 03/12/2024       15-20 Weeks Lab:  - Quad Screen: normal risk    28 Week Lab: 7/2/24  - 1H GTT: not indicated  - Rhogam: not indicated  - Date of Tdap: 7/2/24  - CBC H/H: 12.1/37.0  - RPR: NR      37 Week Lab  - CBC H/H: _  - RPR: _  - GBS Culture: _  - HIV: _  - Urine: GC: _    Ultrasound  - Initial US: 3/12/24  Single viable intrauterine pregnancy with a crown-rump length indicative of an 11 week 1 day gestation.     - 20 wk anatomy US: 5/21/24  A detailed fetal anatomic ultrasound examination was performed for the following high risk indication: Diabetes.   No fetal structural malformations are identified; however, fetal imaging is incomplete today (posterior fossa, AA).   A follow-up study will be scheduled to complete the fetal anatomic survey.   Fetal size today is consistent with established gestational age.   Cervical length by TA scanning is normal.   Placental location is posterior without evidence of previa. A large subchorionic hematoma is noted       Assessment:     Problem List Items Addressed This Visit    None  Visit Diagnoses       34 weeks gestation of pregnancy    -  Primary    Relevant Orders    POCT URINE DIPSTICK WITHOUT MICROSCOPE (Completed)    Fetal non-stress test              Plan:        34 Weeks Gestation of Pregnancy  OB Protocol   Prenatal vitamins   Urine dip: reviewed   Routine labs: as above   Mother plans on breast feeding   Postpartum contraception discussion: none  Labor precautions discussed in depth      Diabetes mellitus   Glucose logs scanned into media  Continue metformin   Continue Lantus 18 U Qam and 6 U QHS ; Novolog 10 U w/ lunch & 12 U w/ dinner and follow up with M    Chronic Hypertension  BP at goal, not on medications  Continue to log    Herpes simplex type 2  Suppressive therapy at 36 weeks  Contact clinic if outbreak occurs    RTC in 2 weeks    Kem Khan MD  [employer]   PGY-I

## 2024-08-20 NOTE — PROGRESS NOTES
" FETAL ASSESSMENT REPORT    RE: Ruby Angel  MRN:  [id number]  :  1985  AGE:  39 y.o.    Date:  2024    REFERRAL PHYSICIAN: Family Medicine Clinic    Allergies: Benadryl allergy-sinus, Diphenhydramine hcl, and Diphenhyd-pe-acetaminophen    Ruby is a 39 y.o.  at 34w0d gestational age here today for a NST.    10/1/2024, by Ultrasound    MEDICATIONS AT TIME OF TEST:    Current Outpatient Medications   Medication Sig Dispense Refill    aspirin (ECOTRIN) 81 MG EC tablet Take 1 tablet (81 mg total) by mouth once daily. 30 tablet 0    BD ULTRA-FINE MINI PEN NEEDLE 31 gauge x 3/16" Ndle Inject into the skin.      blood sugar diagnostic ([employer]100 BLOOD GLUCOSE TEST STRIP) Strp 1 each by Misc.(Non-Drug; Combo Route) route 4 (four) times daily. 100 each 3    insulin aspart U-100 (NOVOLOG FLEXPEN U-100 INSULIN) 100 unit/mL (3 mL) InPn pen Inject 6u with lunch and dinner each day 12 mL 1    lancets 33 gauge Misc Apply topically 2 (two) times daily.      LANTUS SOLOSTAR U-100 INSULIN 100 unit/mL (3 mL) InPn pen Inject into the skin once daily.      metFORMIN (GLUCOPHAGE) 1000 MG tablet Take 1.5 tablets (1,500 mg total) by mouth daily with breakfast. W/ DIABETIC SNACK 45 tablet 3    PNV,calcium 72/iron/folic acid (PRENATAL VITAMIN) Tab Take 1 tablet by mouth once daily. 30 tablet 2    TRUE METRIX GLUCOSE METER Misc USE AS INSTRUCTED       No current facility-administered medications for this visit.       Indication: chronic hypertension    Interpretation:  130 BPM baseline    Variability:  Moderate    Accelerations:  Present    Decelerations:  None    Fetal Movement: Yes      Assessment: Reactive NST    Plan: Follow-up one week for repeat NST        Enrique Das MD  2024; 8:57 AM        "

## 2024-08-21 NOTE — PROGRESS NOTES
I have personally reviewed the review of systems (ROS) and past, family and social histories (PFSH) documented above by the resident.  I have reviewed the care furnished by the resident during the encounter, including a review of the patient's medical history, the resident's findings on physical examination, diagnosis, and the treatment plan.  I participated in the management of the patient and was immediately available throughout the encounter.   I was physically present during all key portions of the service(s) provided with the resident.  Services were furnished in a primary care center located in the outpatient department of a [facility].

## 2024-08-27 ENCOUNTER — CLINICAL SUPPORT (OUTPATIENT)
Dept: FAMILY MEDICINE | Facility: CLINIC | Age: 39
End: 2024-08-27
Payer: MEDICAID

## 2024-08-27 VITALS
RESPIRATION RATE: 20 BRPM | DIASTOLIC BLOOD PRESSURE: 76 MMHG | HEART RATE: 116 BPM | TEMPERATURE: 98 F | OXYGEN SATURATION: 98 % | SYSTOLIC BLOOD PRESSURE: 115 MMHG

## 2024-08-27 DIAGNOSIS — Z3A.35 35 WEEKS GESTATION OF PREGNANCY: Primary | ICD-10-CM

## 2024-08-27 PROCEDURE — 99213 OFFICE O/P EST LOW 20 MIN: CPT | Mod: PBBFAC

## 2024-08-27 NOTE — PROGRESS NOTES
Pt placed on NST monitor x20+ minutes. Strip shown in clinic to Dr. Das. OK given to discharge patient home. Pt aware of follow up appt for next week.

## 2024-08-27 NOTE — PROGRESS NOTES
" FETAL ASSESSMENT REPORT    RE: Ruby Angel  MRN:  [id number]  :  1985  AGE:  39 y.o.    Date:  2024    REFERRAL PHYSICIAN: Family Medicine Clinic    Allergies: Benadryl allergy-sinus, Diphenhydramine hcl, and Diphenhyd-pe-acetaminophen    Ruby is a 39 y.o.  at 35w0d gestational age here today for a NST.    10/1/2024, by Ultrasound    MEDICATIONS AT TIME OF TEST:    Current Outpatient Medications   Medication Sig Dispense Refill    aspirin (ECOTRIN) 81 MG EC tablet Take 1 tablet (81 mg total) by mouth once daily. 30 tablet 0    BD ULTRA-FINE MINI PEN NEEDLE 31 gauge x 3/16" Ndle Inject into the skin.      blood sugar diagnostic ([employer] BLOOD GLUCOSE TEST STRIP) Strp 1 each by Misc.(Non-Drug; Combo Route) route 4 (four) times daily. 100 each 3    insulin aspart U-100 (NOVOLOG FLEXPEN U-100 INSULIN) 100 unit/mL (3 mL) InPn pen Inject 6u with lunch and dinner each day 12 mL 1    lancets 33 gauge Misc Apply topically 2 (two) times daily.      LANTUS SOLOSTAR U-100 INSULIN 100 unit/mL (3 mL) InPn pen Inject into the skin once daily.      metFORMIN (GLUCOPHAGE) 1000 MG tablet Take 1.5 tablets (1,500 mg total) by mouth daily with breakfast. W/ DIABETIC SNACK 45 tablet 3    PNV,calcium 72/iron/folic acid (PRENATAL VITAMIN) Tab Take 1 tablet by mouth once daily. 30 tablet 2    TRUE METRIX GLUCOSE METER Misc USE AS INSTRUCTED       No current facility-administered medications for this visit.       Indication: chronic hypertension    Interpretation:  140 BPM baseline    Variability:  Moderate    Accelerations:  Present    Decelerations:  None    Fetal Movement: Yes      Assessment: Reactive NST    Plan: Follow-up one week for repeat NST        Enrique Das MD  2024; 1:52 PM        "

## 2024-09-03 ENCOUNTER — TELEPHONE (OUTPATIENT)
Dept: MATERNAL FETAL MEDICINE | Facility: CLINIC | Age: 39
End: 2024-09-03
Payer: MEDICAID

## 2024-09-03 ENCOUNTER — OFFICE VISIT (OUTPATIENT)
Dept: FAMILY MEDICINE | Facility: CLINIC | Age: 39
End: 2024-09-03
Payer: MEDICAID

## 2024-09-03 VITALS
TEMPERATURE: 98 F | HEART RATE: 110 BPM | DIASTOLIC BLOOD PRESSURE: 77 MMHG | RESPIRATION RATE: 20 BRPM | BODY MASS INDEX: 27.45 KG/M2 | SYSTOLIC BLOOD PRESSURE: 105 MMHG | WEIGHT: 160.81 LBS | HEIGHT: 64 IN | OXYGEN SATURATION: 99 %

## 2024-09-03 DIAGNOSIS — B00.9 HERPES SIMPLEX VIRUS TYPE 2 (HSV-2) INFECTION AFFECTING PREGNANCY IN THIRD TRIMESTER: ICD-10-CM

## 2024-09-03 DIAGNOSIS — Z3A.36 36 WEEKS GESTATION OF PREGNANCY: Primary | ICD-10-CM

## 2024-09-03 DIAGNOSIS — O98.513 HERPES SIMPLEX VIRUS TYPE 2 (HSV-2) INFECTION AFFECTING PREGNANCY IN THIRD TRIMESTER: ICD-10-CM

## 2024-09-03 LAB
BASOPHILS # BLD AUTO: 0.02 X10(3)/MCL
BASOPHILS NFR BLD AUTO: 0.2 %
BILIRUB SERPL-MCNC: NEGATIVE MG/DL
BLOOD URINE, POC: NEGATIVE
C TRACH DNA SPEC QL NAA+PROBE: NOT DETECTED
CLARITY, POC UA: CLEAR
CLUE CELLS VAG QL WET PREP: NORMAL
COLOR, POC UA: YELLOW
EOSINOPHIL # BLD AUTO: 0.04 X10(3)/MCL (ref 0–0.9)
EOSINOPHIL NFR BLD AUTO: 0.5 %
ERYTHROCYTE [DISTWIDTH] IN BLOOD BY AUTOMATED COUNT: 14.6 % (ref 11.5–17)
GLUCOSE UR QL STRIP: NEGATIVE
HCT VFR BLD AUTO: 39.7 % (ref 37–47)
HGB BLD-MCNC: 12.6 G/DL (ref 12–16)
HIV 1+2 AB+HIV1 P24 AG SERPL QL IA: NONREACTIVE
IMM GRANULOCYTES # BLD AUTO: 0.02 X10(3)/MCL (ref 0–0.04)
IMM GRANULOCYTES NFR BLD AUTO: 0.2 %
KETONES UR QL STRIP: NEGATIVE
LEUKOCYTE ESTERASE URINE, POC: NEGATIVE
LYMPHOCYTES # BLD AUTO: 1.48 X10(3)/MCL (ref 0.6–4.6)
LYMPHOCYTES NFR BLD AUTO: 18.3 %
MCH RBC QN AUTO: 27.2 PG (ref 27–31)
MCHC RBC AUTO-ENTMCNC: 31.7 G/DL (ref 33–36)
MCV RBC AUTO: 85.6 FL (ref 80–94)
MONOCYTES # BLD AUTO: 0.68 X10(3)/MCL (ref 0.1–1.3)
MONOCYTES NFR BLD AUTO: 8.4 %
N GONORRHOEA DNA SPEC QL NAA+PROBE: NOT DETECTED
NEUTROPHILS # BLD AUTO: 5.85 X10(3)/MCL (ref 2.1–9.2)
NEUTROPHILS NFR BLD AUTO: 72.4 %
NITRITE, POC UA: NEGATIVE
NRBC BLD AUTO-RTO: 0 %
PH, POC UA: 7.5
PLATELET # BLD AUTO: 245 X10(3)/MCL (ref 130–400)
PMV BLD AUTO: 11.1 FL (ref 7.4–10.4)
PRENATAL STREP B CULTURE: POSITIVE
PROTEIN, POC: NEGATIVE
RBC # BLD AUTO: 4.64 X10(6)/MCL (ref 4.2–5.4)
SOURCE (OHS): NORMAL
SPECIFIC GRAVITY, POC UA: 1.02
T PALLIDUM AB SER QL: NONREACTIVE
T VAGINALIS VAG QL WET PREP: NORMAL
UROBILINOGEN, POC UA: 1
WBC # BLD AUTO: 8.09 X10(3)/MCL (ref 4.5–11.5)
WBC #/AREA VAG WET PREP: NORMAL
YEAST SPEC QL WET PREP: NORMAL

## 2024-09-03 PROCEDURE — 36415 COLL VENOUS BLD VENIPUNCTURE: CPT

## 2024-09-03 PROCEDURE — 87389 HIV-1 AG W/HIV-1&-2 AB AG IA: CPT

## 2024-09-03 PROCEDURE — 99214 OFFICE O/P EST MOD 30 MIN: CPT | Mod: PBBFAC

## 2024-09-03 PROCEDURE — 85025 COMPLETE CBC W/AUTO DIFF WBC: CPT

## 2024-09-03 PROCEDURE — 87210 SMEAR WET MOUNT SALINE/INK: CPT

## 2024-09-03 PROCEDURE — 81002 URINALYSIS NONAUTO W/O SCOPE: CPT | Mod: PBBFAC

## 2024-09-03 PROCEDURE — 87077 CULTURE AEROBIC IDENTIFY: CPT

## 2024-09-03 PROCEDURE — 87081 CULTURE SCREEN ONLY: CPT

## 2024-09-03 PROCEDURE — 87491 CHLMYD TRACH DNA AMP PROBE: CPT

## 2024-09-03 PROCEDURE — 86780 TREPONEMA PALLIDUM: CPT

## 2024-09-03 RX ORDER — VALACYCLOVIR HYDROCHLORIDE 500 MG/1
500 TABLET, FILM COATED ORAL 2 TIMES DAILY
Qty: 60 TABLET | Refills: 0 | Status: SHIPPED | OUTPATIENT
Start: 2024-09-03 | End: 2024-10-03

## 2024-09-03 NOTE — PROGRESS NOTES
[facility] OB OFFICE VISIT NOTE  Ruby Angel  [id number]  2024    Chief Complaint: Routine Prenatal Visit (HROB 36w, c/o lower back pain at times, nausea.)      Ruby Angel is a 39 y.o. female   36w0d YOMI 10/1/2024 by Ultrasound presenting to [facility] for 36 week OB visit.    Current Issues: Lower back pain, worse with exertion. Reports some associated weakness that improves with rest, no bowel/bladder incontinence.   Some hand weakness and tingling in wrists, does not radiate. Previous hx of tendonitis in last pregnancy requiring wrist braces.    Chronic Issues:   - advanced maternal age  - Pre-diabetes and early onset GDM in prior pregnancy: Metformin 1500 mg daily, Levemir 18u QAM and 6u QHS; Novolog 10u with lunch and 12u with dinner   - Multinodular goiter  - HTN, chronic: no medication  - History of prediabetes/PCOS  - ADHD- adderall prior to pregnancy, not taking now  - Herpes Simplex type 2, last flare was February, when she first discovered in pregnancy. Last took antiviral medication for it 5 years ago.   - Previous C section  - History of preeclampsia with severe features in prior pregnancy and required IV magnesium during labor and hospitalization.      Gestational History:  (date, GA, length labor, BW, sex, type, anesthesia, place, complications)  OB History    Para Term  AB Living   4 1 1   2 1   SAB IAB Ectopic Multiple Live Births   2       1      # Outcome Date GA Lbr Luis/2nd Weight Sex Type Anes PTL Lv   4 Current            3 Term 10/29/21 39w4d  3.09 kg (6 lb 13 oz) M CS-Unspec EPI N ESTUARDO      Complications: Failure to Progress in Second Stage   2 2018 10w0d    SAB         Birth Comments: D&C   1 2018 5w0d    SAB        Gyn History:   - LMP: 25  - Menarche: age 15  - Menstrual Hx: regular, 5 day cycles. 7 pads per day  - Hx of birth control: OCP  - Hx of STDs: HSV2  - History of Abnormal PAP: positive for HPV, ASCUS in      (As documented by  "patient prior to office visit)  Review of Systems   Constitutional:  Negative for activity change and unexpected weight change.   HENT:  Negative for hearing loss, rhinorrhea and trouble swallowing.    Eyes:  Negative for discharge and visual disturbance.   Respiratory:  Negative for chest tightness and wheezing.    Cardiovascular:  Negative for chest pain and palpitations.   Gastrointestinal:  Negative for blood in stool, constipation, diarrhea and vomiting.   Endocrine: Negative for polydipsia and polyuria.   Genitourinary:  Negative for difficulty urinating, dysuria, hematuria and menstrual problem.   Musculoskeletal:  Negative for arthralgias, joint swelling and neck pain.   Neurological:  Positive for headaches. Negative for weakness.   Psychiatric/Behavioral:  Negative for confusion and dysphoric mood.      Antepartum specific ROS  - Fetal movements: Yes  - Vaginal bleeding: No  - Vaginal discharge: No  - Loss of fluid: No  - Contractions: Yes, occurring 20x per day, last 30 secs-1 min  - Headaches: Yes, occurring daily, improve with rest and water  - Vision changes: Noticing some floaters in vision, 2x per day, last for a few seconds at a time  - Edema: No    Blood pressure 105/77, pulse 110, temperature 97.9 °F (36.6 °C), temperature source Oral, resp. rate 20, height 5' 4" (1.626 m), weight 72.9 kg (160 lb 12.8 oz), last menstrual period 12/04/2023, SpO2 99%.   Physical Exam  Physical Exam  General: in no acute distress  RESP: clear to auscultation bilaterally, non labored  CV: regular rate and rhythm, no murmurs, no edema  ABD: gravid, nontender, BS+  EXT: 5/5 strength bilaterally in lower extremities; Tinsel's and Phalen's sign negative in bilateral wrists  : No visible lesions or discharge present along labia majora or minora; Vaginal vault pink with thick, white discharge present, no visible lesions; Cervical os fingertip dilated, not friable, no visible lesions, no CMT  FHT: 140 bpm by NST  Fundal " "Height: 36 cm    Current Medications:   Current Outpatient Medications   Medication Sig Dispense Refill    aspirin (ECOTRIN) 81 MG EC tablet Take 1 tablet (81 mg total) by mouth once daily. 30 tablet 0    BD ULTRA-FINE MINI PEN NEEDLE 31 gauge x 3/16" Ndle Inject into the skin.      blood sugar diagnostic ( BLOOD GLUCOSE TEST STRIP) Strp 1 each by Misc.(Non-Drug; Combo Route) route 4 (four) times daily. 100 each 3    insulin aspart U-100 (NOVOLOG FLEXPEN U-100 INSULIN) 100 unit/mL (3 mL) InPn pen Inject 6u with lunch and dinner each day 12 mL 1    lancets 33 gauge Misc Apply topically 2 (two) times daily.      LANTUS SOLOSTAR U-100 INSULIN 100 unit/mL (3 mL) InPn pen Inject into the skin once daily.      metFORMIN (GLUCOPHAGE) 1000 MG tablet Take 1.5 tablets (1,500 mg total) by mouth daily with breakfast. W/ DIABETIC SNACK 45 tablet 3    PNV,calcium 72/iron/folic acid (PRENATAL VITAMIN) Tab Take 1 tablet by mouth once daily. 30 tablet 2    TRUE METRIX GLUCOSE METER Misc USE AS INSTRUCTED      valACYclovir (VALTREX) 500 MG tablet Take 1 tablet (500 mg total) by mouth 2 (two) times daily. 60 tablet 0     No current facility-administered medications for this visit.       Labs:  Urine dipstick:   Glucose, UA negative   Bilirubin, POC negative   Ketones, UA negative   Spec Grav UA 1.020   Blood, UA negative   pH, UA 7.5   Protein, POC negative   Urobilinogen, UA 1.0   Nitrite, UA negative   WBC, UA negative   Color, UA Yellow   Clarity, UA Clear       Initial OB Labs: 3/12/24  - Blood Type and Rh: A positive  - CBC H/H: 13.5/39.5  - HIV: NR  - RPR: NR  - GC: Negative  - CT: Negative  - HBsAg: NR  - HCVAb: NR  - Rubella: Vaccinated  - Varicella: Vaccinated  - UA & Culture: No growth  - Sickle Cell Screen: Negative  - PAP: NIL; HR-HPV negative  - Influenza vaccine date: 03/12/2024        15-20 Weeks Lab:  - Quad Screen: normal risk    28 Week Lab: 7/2/24  - 1H GTT: not indicated  - Rhogam: not indicated  - Date of " Tdap: 24  - CBC H/H: 12.1/37.0  - RPR: NR    37 Week Lab 9/3/2024  - CBC H/H: 12.6/39.7  - RPR:   - GBS Culture:   - HIV:   - Cervical GC:     Imaging:   - Initial US: 3/12/24  Single viable intrauterine pregnancy with a crown-rump length indicative of an 11 week 1 day gestation.     - 20 wk anatomy US: 24  A detailed fetal anatomic ultrasound examination was performed for the following high risk indication: Diabetes.   No fetal structural malformations are identified; however, fetal imaging is incomplete today (posterior fossa, AA).   A follow-up study will be scheduled to complete the fetal anatomic survey.   Fetal size today is consistent with established gestational age.   Cervical length by TA scanning is normal.   Placental location is posterior without evidence of previa. A large subchorionic hematoma is noted     Assessment:   1. 36 weeks gestation of pregnancy    2. Herpes simplex virus type 2 (HSV-2) infection affecting pregnancy in third trimester        Plan:  - Prior : Yes  - Continue to attend all scheduled OB visits  - ED precautions for any RUQ pain, leakage of fluids, or severe headache  - Should the patient need to visit the ED go to the OB ED at [facility]  - Continue daily aspirin  - PNVs  - Urine dip reviewed as above  - Indicated labs: PENDING  - Mother plans to breast feeding   - Postpartum contraception discussion: none  - Labor precautions discussed in depth  - FM Continuity patient after delivery: No  - Patient scheduled for repeat C section on  at 8:30 am (38^0 wks)    Diabetes mellitus   Glucose logs scanned into media  Continue metformin 1500 mg daily, Lantus 18 U Qam and 6 U QHS ; Novolog 10 U w/ lunch & 12 U w/ dinner as per [facility]     Chronic Hypertension  BP at goal, not on medications  Continue checking at home     Herpes simplex type 2  Valtrex suppressive therapy ordered today  Contact clinic if outbreak occurs    Orders Placed This Encounter   Procedures    Strep  Only Culture    Chlamydia/GC, PCR    Wet Prep, Genital    CBC Auto Differential    SYPHILIS ANTIBODY (WITH REFLEX RPR)    HIV 1/2 Ag/Ab (4th Gen)    CBC with Differential    POCT URINE DIPSTICK WITHOUT MICROSCOPE       Return to HROB clinic in 1 weeks with NST      Craig Alcantar MD  [facility]

## 2024-09-03 NOTE — TELEPHONE ENCOUNTER
Per Crystal, increase Qativgs43 qam to 22 qam, all other dosages with remain the same.    I called spoke with pt informed pt of new insulin dose adjustment, pt verbalized understanding.

## 2024-09-03 NOTE — PROGRESS NOTES
" FETAL ASSESSMENT REPORT    RE: Ruby Angel  MRN:  [id number]  :  1985  AGE:  39 y.o.    Date:  9/3/2024    REFERRAL PHYSICIAN: Family Medicine Clinic    Allergies: Benadryl allergy-sinus, Diphenhydramine hcl, and Diphenhyd-pe-acetaminophen    Ruby is a 39 y.o.  at 36w0d gestational age here today for a NST.    10/1/2024, by Ultrasound    MEDICATIONS AT TIME OF TEST:    Current Outpatient Medications   Medication Sig Dispense Refill    aspirin (ECOTRIN) 81 MG EC tablet Take 1 tablet (81 mg total) by mouth once daily. 30 tablet 0    BD ULTRA-FINE MINI PEN NEEDLE 31 gauge x 3/16" Ndle Inject into the skin.      blood sugar diagnostic ( BLOOD GLUCOSE TEST STRIP) Strp 1 each by Misc.(Non-Drug; Combo Route) route 4 (four) times daily. 100 each 3    insulin aspart U-100 (NOVOLOG FLEXPEN U-100 INSULIN) 100 unit/mL (3 mL) InPn pen Inject 6u with lunch and dinner each day 12 mL 1    lancets 33 gauge Misc Apply topically 2 (two) times daily.      LANTUS SOLOSTAR U-100 INSULIN 100 unit/mL (3 mL) InPn pen Inject into the skin once daily.      metFORMIN (GLUCOPHAGE) 1000 MG tablet Take 1.5 tablets (1,500 mg total) by mouth daily with breakfast. W/ DIABETIC SNACK 45 tablet 3    PNV,calcium 72/iron/folic acid (PRENATAL VITAMIN) Tab Take 1 tablet by mouth once daily. 30 tablet 2    TRUE METRIX GLUCOSE METER Misc USE AS INSTRUCTED      valACYclovir (VALTREX) 500 MG tablet Take 1 tablet (500 mg total) by mouth 2 (two) times daily. 60 tablet 0     No current facility-administered medications for this visit.       Indication: chronic hypertension    Interpretation:  140 BPM baseline    Variability:  Moderate    Accelerations:  Present    Decelerations:  None    Fetal Movement: Yes      Assessment: Reactive NST    Plan: Follow-up one week for repeat NST        Enrique Das MD  9/3/2024; 10:52 AM        "

## 2024-09-04 ENCOUNTER — ANESTHESIA EVENT (OUTPATIENT)
Dept: OBSTETRICS AND GYNECOLOGY | Facility: HOSPITAL | Age: 39
End: 2024-09-04
Payer: MEDICAID

## 2024-09-04 NOTE — PROGRESS NOTES
I have personally reviewed the review of systems (ROS) and past, family and social histories (PFSH) documented above by the resident.  I have reviewed the care furnished by the resident during the encounter, including a review of the patient's medical history, the resident's findings on physical examination, diagnosis, and the treatment plan.  I participated in the management of the patient and was immediately available throughout the encounter.   I was physically present during all key portions of the service(s) provided with the resident.  Services were furnished in a primary care center located in the outpatient department of a [facility].

## 2024-09-05 LAB — BACTERIA SPEC CULT: ABNORMAL

## 2024-09-10 ENCOUNTER — TELEPHONE (OUTPATIENT)
Dept: MATERNAL FETAL MEDICINE | Facility: CLINIC | Age: 39
End: 2024-09-10
Payer: MEDICAID

## 2024-09-10 ENCOUNTER — OFFICE VISIT (OUTPATIENT)
Dept: FAMILY MEDICINE | Facility: CLINIC | Age: 39
End: 2024-09-10
Payer: MEDICAID

## 2024-09-10 VITALS
DIASTOLIC BLOOD PRESSURE: 73 MMHG | WEIGHT: 162.63 LBS | BODY MASS INDEX: 27.77 KG/M2 | HEIGHT: 64 IN | HEART RATE: 104 BPM | TEMPERATURE: 98 F | OXYGEN SATURATION: 98 % | RESPIRATION RATE: 20 BRPM | SYSTOLIC BLOOD PRESSURE: 113 MMHG

## 2024-09-10 DIAGNOSIS — Z3A.37 37 WEEKS GESTATION OF PREGNANCY: Primary | ICD-10-CM

## 2024-09-10 DIAGNOSIS — B00.9 HSV (HERPES SIMPLEX VIRUS) INFECTION: ICD-10-CM

## 2024-09-10 DIAGNOSIS — O99.343 MENTAL DISORDER AFFECTING PREGNANCY IN THIRD TRIMESTER: ICD-10-CM

## 2024-09-10 DIAGNOSIS — O24.913 DIABETES MELLITUS AFFECTING PREGNANCY IN THIRD TRIMESTER: ICD-10-CM

## 2024-09-10 DIAGNOSIS — B95.1 POSITIVE GBS TEST: ICD-10-CM

## 2024-09-10 LAB
BILIRUB SERPL-MCNC: NORMAL MG/DL
BLOOD URINE, POC: NORMAL
CLARITY, POC UA: NORMAL
COLOR, POC UA: YELLOW
GLUCOSE UR QL STRIP: NORMAL
KETONES UR QL STRIP: NORMAL
LEUKOCYTE ESTERASE URINE, POC: NORMAL
NITRITE, POC UA: NORMAL
PH, POC UA: 7
PROTEIN, POC: NORMAL
SPECIFIC GRAVITY, POC UA: 1.02
UROBILINOGEN, POC UA: 1

## 2024-09-10 PROCEDURE — 99214 OFFICE O/P EST MOD 30 MIN: CPT | Mod: PBBFAC

## 2024-09-10 PROCEDURE — 81002 URINALYSIS NONAUTO W/O SCOPE: CPT | Mod: PBBFAC

## 2024-09-10 NOTE — PROGRESS NOTES
" FETAL ASSESSMENT REPORT    RE: Ruby Angel  MRN:  [id number]  :  1985  AGE:  39 y.o.    Date:  9/10/2024    REFERRAL PHYSICIAN: [facility] Clinic    Allergies: Benadryl allergy-sinus, Diphenhydramine hcl, and Diphenhyd-pe-acetaminophen    Ruby is a 39 y.o.  at 37w0d gestational age here today for a NST.    10/1/2024, by Ultrasound    MEDICATIONS AT TIME OF TEST:    Current Outpatient Medications   Medication Sig Dispense Refill    aspirin (ECOTRIN) 81 MG EC tablet Take 1 tablet (81 mg total) by mouth once daily. 30 tablet 0    BD ULTRA-FINE MINI PEN NEEDLE 31 gauge x 3/16" Ndle Inject into the skin.      blood sugar diagnostic ( BLOOD GLUCOSE TEST STRIP) Strp 1 each by Misc.(Non-Drug; Combo Route) route 4 (four) times daily. 100 each 3    insulin aspart U-100 (NOVOLOG FLEXPEN U-100 INSULIN) 100 unit/mL (3 mL) InPn pen Inject 6u with lunch and dinner each day 12 mL 1    lancets 33 gauge Misc Apply topically 2 (two) times daily.      LANTUS SOLOSTAR U-100 INSULIN 100 unit/mL (3 mL) InPn pen Inject into the skin once daily.      metFORMIN (GLUCOPHAGE) 1000 MG tablet Take 1.5 tablets (1,500 mg total) by mouth daily with breakfast. W/ DIABETIC SNACK 45 tablet 3    PNV,calcium 72/iron/folic acid (PRENATAL VITAMIN) Tab Take 1 tablet by mouth once daily. 30 tablet 2    TRUE METRIX GLUCOSE METER Misc USE AS INSTRUCTED      valACYclovir (VALTREX) 500 MG tablet Take 1 tablet (500 mg total) by mouth 2 (two) times daily. 60 tablet 0     No current facility-administered medications for this visit.       Indication: chronic hypertension    Interpretation:  140 BPM baseline    Variability:  Moderate    Accelerations:  Present    Decelerations:  None    Fetal Movement: Yes      Assessment: Reactive NST    Plan: Follow-up one week for repeat NST        Enrique Das MD  9/10/2024; 9:52 AM        "

## 2024-09-10 NOTE — PROGRESS NOTES
"OB Office Visit Note    Name: Ruby Angel  MRN: [id number]  Date: 09/10/2024    Subjective:      Chief Complaint: Routine Prenatal Visit (HROB, 37w0d, c/o frequent lower back pain.)      Ruby Angel is a 39 y.o.  at 37w0d with YOMI 10/1/2024, by Ultrasound presents to [facility] for 37 week OB visit.    Current issues: No complaints. Reports good fetal movement.     Chronic issues:   AMA  Pre-diabetes   GDM   ADHD  Anxiety  Depression  Herpes Simplex  Previous C section  History of preeclampsia   Multinodular Goiter  Chronic HTN       Antepartum specific ROS  - Fetal movements: Yes   - Vaginal bleeding: No  - Vaginal discharge: No  - Loss of fluid: No  - Contractions: No  - Headaches: No  - Vision changes: No  - Edema: No    Meds:   Prior to Admission medications    Medication Sig Start Date End Date Taking? Authorizing Provider   aspirin (ECOTRIN) 81 MG EC tablet Take 1 tablet (81 mg total) by mouth once daily. 24   Martha Perrin,    BD ULTRA-FINE MINI PEN NEEDLE 31 gauge x 3/16" Ndle Inject into the skin. 24   Provider, Historical   blood sugar diagnostic ( BLOOD GLUCOSE TEST STRIP) Strp 1 each by Misc.(Non-Drug; Combo Route) route 4 (four) times daily. 24   Veena Mcgee MD   insulin aspart U-100 (NOVOLOG FLEXPEN U-100 INSULIN) 100 unit/mL (3 mL) InPn pen Inject 6u with lunch and dinner each day 24   Crystal Saldivar NP   lancets 33 gauge Misc Apply topically 2 (two) times daily. 24   Provider, Historical   LANTUS SOLOSTAR U-100 INSULIN 100 unit/mL (3 mL) InPn pen Inject into the skin once daily. 24   Provider, Historical   metFORMIN (GLUCOPHAGE) 1000 MG tablet Take 1.5 tablets (1,500 mg total) by mouth daily with breakfast. W/ DIABETIC SNACK 24   Crystal Saldivar NP   PNV,calcium 72/iron/folic acid (PRENATAL VITAMIN) Tab Take 1 tablet by mouth once daily. 24  Tj Livingston MD   TRUE METRIX GLUCOSE METER Misc USE AS INSTRUCTED " "24   Provider, Historical   valACYclovir (VALTREX) 500 MG tablet Take 1 tablet (500 mg total) by mouth 2 (two) times daily. 9/3/24 10/3/24  Craig Kerr MD     Allergies:   Review of patient's allergies indicates:   Allergen Reactions    Benadryl allergy-sinus Shortness Of Breath    Diphenhydramine hcl Swelling    Diphenhyd-pe-acetaminophen        Gestational History:   OB History    Para Term  AB Living   4 1 1 0 2 1   SAB IAB Ectopic Multiple Live Births   2 0 0 0 1      # Outcome Date GA Lbr Luis/2nd Weight Sex Type Anes PTL Lv   4 Current            3 Term 10/29/21 39w4d  3.09 kg (6 lb 13 oz) M CS-Unspec EPI N ESTUARDO      Complications: Failure to Progress in Second Stage   2 SAB 2018 10w0d    SAB         Birth Comments: D&C   1 2018 5w0d    SAB          Objective:      Vitals:    09/10/24 0931   BP: 113/73   BP Location: Right arm   Patient Position: Sitting   BP Method: Medium (Automatic)   Pulse: 104   Resp: 20   Temp: 98.2 °F (36.8 °C)   TempSrc: Oral   SpO2: 98%   Weight: 73.8 kg (162 lb 9.6 oz)   Height: 5' 4" (1.626 m)     General:   RESP: non labored  CV: no edema  ABD: gravid, nontender  FHTs: 155 bpm;   Fundal height: 36 cm        Urine dip:  Lab Results   Component Value Date    COLORU Yellow 09/10/2024    SPECGRAV 1.020 09/10/2024    PHUR 7.0 09/10/2024    WBCUR trace 09/10/2024    NITRITE neg 09/10/2024    PROTEINPOC neg 09/10/2024    GLUCOSEUR neg 09/10/2024    KETONESU trace 09/10/2024    UROBILINOGEN 1.0 09/10/2024    BILIRUBINPOC neg 09/10/2024    RBCUR neg 09/10/2024     Assessment/Plan:     Ruby was seen today for routine prenatal visit.    Diagnoses and all orders for this visit:    37 weeks gestation of pregnancy  -     POCT urine dipstick without microscope  -     Fetal non-stress test; Future    HSV (herpes simplex virus) infection    - Diabetes mellitus affecting pregnancy in third trimester    -Anxiety, depression, ADHD, hx PPD affecting pregnancy in third " trimester    Positive GBS test     OB protocol  PNV, ASA  Glucose logs reviewed; few elevated readings  Patient submitted logs to [facility] this morning, will defer adjustments  On Metformin, Lantus and Novolog  BP at goal  On Valtrex  Labs reviewed  GBS positive  Scheduled repeat c section 9/17/24  NST Reactive today  L&D Precautions    Return to clinic post partum

## 2024-09-10 NOTE — TELEPHONE ENCOUNTER
Per Crystal ask about pt's glucose values that are low, also increase her Novolog for lunch from 10u to 12 and increase the Novolog for dinner form 12units to 16units. Also keep Levemir dose the same, make sure she is eating a protein snack at bedtime.    I spoke with pt informed her of the recommendation from Crystal, pt reports she has not been taking the Levemir 6u qhs she reports she has been having severe nausea and unable to eat anything so should has not taken the insulin in a few weeks. I instructed the pt on the importance of eating it will help with her nausea and also she needs to eat while she is taking the insulin, I rec her eating small meals throughout the day to help out with the nausea. Pt verbalized understanding.

## 2024-09-12 DIAGNOSIS — O24.415 GESTATIONAL DIABETES MELLITUS (GDM) IN SECOND TRIMESTER CONTROLLED ON ORAL HYPOGLYCEMIC DRUG: ICD-10-CM

## 2024-09-13 RX ORDER — METFORMIN HYDROCHLORIDE 1000 MG/1
TABLET ORAL
Qty: 45 TABLET | Refills: 3 | Status: SHIPPED | OUTPATIENT
Start: 2024-09-13

## 2024-09-17 ENCOUNTER — ANESTHESIA (OUTPATIENT)
Dept: OBSTETRICS AND GYNECOLOGY | Facility: HOSPITAL | Age: 39
End: 2024-09-17
Payer: MEDICAID

## 2024-09-17 ENCOUNTER — HOSPITAL ENCOUNTER (INPATIENT)
Facility: HOSPITAL | Age: 39
LOS: 2 days | Discharge: HOME | End: 2024-09-19
Attending: OBSTETRICS & GYNECOLOGY | Admitting: OBSTETRICS & GYNECOLOGY
Payer: MEDICAID

## 2024-09-17 DIAGNOSIS — O24.419 GESTATIONAL DIABETES: ICD-10-CM

## 2024-09-17 DIAGNOSIS — Z98.891 S/P REPEAT LOW TRANSVERSE C-SECTION: ICD-10-CM

## 2024-09-17 LAB
BASOPHILS # BLD AUTO: 0.03 X10(3)/MCL
BASOPHILS NFR BLD AUTO: 0.3 %
EOSINOPHIL # BLD AUTO: 0.07 X10(3)/MCL (ref 0–0.9)
EOSINOPHIL NFR BLD AUTO: 0.7 %
ERYTHROCYTE [DISTWIDTH] IN BLOOD BY AUTOMATED COUNT: 15.5 % (ref 11.5–17)
GROUP & RH: NORMAL
HCT VFR BLD AUTO: 37.1 % (ref 37–47)
HGB BLD-MCNC: 12.4 G/DL (ref 12–16)
IMM GRANULOCYTES # BLD AUTO: 0.04 X10(3)/MCL (ref 0–0.04)
IMM GRANULOCYTES NFR BLD AUTO: 0.4 %
INDIRECT COOMBS: NORMAL
LYMPHOCYTES # BLD AUTO: 2.19 X10(3)/MCL (ref 0.6–4.6)
LYMPHOCYTES NFR BLD AUTO: 21.2 %
MCH RBC QN AUTO: 28.2 PG (ref 27–31)
MCHC RBC AUTO-ENTMCNC: 33.4 G/DL (ref 33–36)
MCV RBC AUTO: 84.3 FL (ref 80–94)
MONOCYTES # BLD AUTO: 0.95 X10(3)/MCL (ref 0.1–1.3)
MONOCYTES NFR BLD AUTO: 9.2 %
NEUTROPHILS # BLD AUTO: 7.03 X10(3)/MCL (ref 2.1–9.2)
NEUTROPHILS NFR BLD AUTO: 68.2 %
NRBC BLD AUTO-RTO: 0 %
PLATELET # BLD AUTO: 201 X10(3)/MCL (ref 130–400)
PMV BLD AUTO: 11.2 FL (ref 7.4–10.4)
POCT GLUCOSE: 155 MG/DL (ref 70–110)
POCT GLUCOSE: 72 MG/DL (ref 70–110)
POCT GLUCOSE: 94 MG/DL (ref 70–110)
RBC # BLD AUTO: 4.4 X10(6)/MCL (ref 4.2–5.4)
SPECIMEN OUTDATE: NORMAL
T PALLIDUM AB SER QL: NONREACTIVE
WBC # BLD AUTO: 10.31 X10(3)/MCL (ref 4.5–11.5)

## 2024-09-17 PROCEDURE — 86850 RBC ANTIBODY SCREEN: CPT

## 2024-09-17 PROCEDURE — 62322 NJX INTERLAMINAR LMBR/SAC: CPT

## 2024-09-17 PROCEDURE — 36004725 HC OB OR TIME LEV III - EA ADD 15 MIN: Performed by: OBSTETRICS & GYNECOLOGY

## 2024-09-17 PROCEDURE — 86780 TREPONEMA PALLIDUM: CPT

## 2024-09-17 PROCEDURE — 25000003 PHARM REV CODE 250: Performed by: ANESTHESIOLOGY

## 2024-09-17 PROCEDURE — 51702 INSERT TEMP BLADDER CATH: CPT

## 2024-09-17 PROCEDURE — 99900035 HC TECH TIME PER 15 MIN (STAT)

## 2024-09-17 PROCEDURE — 63600175 PHARM REV CODE 636 W HCPCS: Mod: JZ,JG | Performed by: ANESTHESIOLOGY

## 2024-09-17 PROCEDURE — 11000001 HC ACUTE MED/SURG PRIVATE ROOM

## 2024-09-17 PROCEDURE — 37000009 HC ANESTHESIA EA ADD 15 MINS: Performed by: OBSTETRICS & GYNECOLOGY

## 2024-09-17 PROCEDURE — 63600175 PHARM REV CODE 636 W HCPCS

## 2024-09-17 PROCEDURE — 36004724 HC OB OR TIME LEV III - 1ST 15 MIN: Performed by: OBSTETRICS & GYNECOLOGY

## 2024-09-17 PROCEDURE — 86901 BLOOD TYPING SEROLOGIC RH(D): CPT

## 2024-09-17 PROCEDURE — 71000033 HC RECOVERY, INTIAL HOUR: Performed by: OBSTETRICS & GYNECOLOGY

## 2024-09-17 PROCEDURE — 85025 COMPLETE CBC W/AUTO DIFF WBC: CPT

## 2024-09-17 PROCEDURE — 37000008 HC ANESTHESIA 1ST 15 MINUTES: Performed by: OBSTETRICS & GYNECOLOGY

## 2024-09-17 PROCEDURE — 27000492 HC SLEEVE, SCD T/L

## 2024-09-17 PROCEDURE — 63600175 PHARM REV CODE 636 W HCPCS: Performed by: ANESTHESIOLOGY

## 2024-09-17 RX ORDER — OXYTOCIN 10 [USP'U]/ML
10 INJECTION, SOLUTION INTRAMUSCULAR; INTRAVENOUS ONCE AS NEEDED
Status: DISCONTINUED | OUTPATIENT
Start: 2024-09-17 | End: 2024-09-19 | Stop reason: HOSPADM

## 2024-09-17 RX ORDER — MEPERIDINE HYDROCHLORIDE 25 MG/ML
12.5 INJECTION INTRAMUSCULAR; INTRAVENOUS; SUBCUTANEOUS EVERY 10 MIN PRN
Status: DISCONTINUED | OUTPATIENT
Start: 2024-09-17 | End: 2024-09-17

## 2024-09-17 RX ORDER — FENTANYL CITRATE 50 UG/ML
INJECTION, SOLUTION INTRAMUSCULAR; INTRAVENOUS
Status: DISCONTINUED | OUTPATIENT
Start: 2024-09-17 | End: 2024-09-17

## 2024-09-17 RX ORDER — SODIUM CHLORIDE, SODIUM LACTATE, POTASSIUM CHLORIDE, CALCIUM CHLORIDE 600; 310; 30; 20 MG/100ML; MG/100ML; MG/100ML; MG/100ML
INJECTION, SOLUTION INTRAVENOUS CONTINUOUS
Status: DISCONTINUED | OUTPATIENT
Start: 2024-09-17 | End: 2024-09-17

## 2024-09-17 RX ORDER — OXYTOCIN-SODIUM CHLORIDE 0.9% IV SOLN 30 UNIT/500ML 30-0.9/5 UT/ML-%
95 SOLUTION INTRAVENOUS ONCE AS NEEDED
Status: COMPLETED | OUTPATIENT
Start: 2024-09-17 | End: 2024-09-17

## 2024-09-17 RX ORDER — HYDROMORPHONE HYDROCHLORIDE 2 MG/ML
0.4 INJECTION, SOLUTION INTRAMUSCULAR; INTRAVENOUS; SUBCUTANEOUS EVERY 5 MIN PRN
Status: DISCONTINUED | OUTPATIENT
Start: 2024-09-17 | End: 2024-09-17

## 2024-09-17 RX ORDER — NALOXONE HCL 0.4 MG/ML
0.04 VIAL (ML) INJECTION
Status: DISCONTINUED | OUTPATIENT
Start: 2024-09-17 | End: 2024-09-19 | Stop reason: HOSPADM

## 2024-09-17 RX ORDER — CEFAZOLIN SODIUM 2 G/50ML
2 SOLUTION INTRAVENOUS
Status: COMPLETED | OUTPATIENT
Start: 2024-09-17 | End: 2024-09-17

## 2024-09-17 RX ORDER — DIPHENOXYLATE HYDROCHLORIDE AND ATROPINE SULFATE 2.5; .025 MG/1; MG/1
2 TABLET ORAL EVERY 6 HOURS PRN
Status: DISCONTINUED | OUTPATIENT
Start: 2024-09-17 | End: 2024-09-19 | Stop reason: HOSPADM

## 2024-09-17 RX ORDER — MUPIROCIN 20 MG/G
OINTMENT TOPICAL
Status: DISCONTINUED | OUTPATIENT
Start: 2024-09-17 | End: 2024-09-17

## 2024-09-17 RX ORDER — BISACODYL 10 MG/1
10 SUPPOSITORY RECTAL ONCE AS NEEDED
Status: DISCONTINUED | OUTPATIENT
Start: 2024-09-17 | End: 2024-09-19 | Stop reason: HOSPADM

## 2024-09-17 RX ORDER — GLUCAGON 1 MG
1 KIT INJECTION
Status: DISCONTINUED | OUTPATIENT
Start: 2024-09-17 | End: 2024-09-19 | Stop reason: HOSPADM

## 2024-09-17 RX ORDER — AMOXICILLIN 250 MG
1 CAPSULE ORAL NIGHTLY PRN
Status: DISCONTINUED | OUTPATIENT
Start: 2024-09-17 | End: 2024-09-19 | Stop reason: HOSPADM

## 2024-09-17 RX ORDER — IPRATROPIUM BROMIDE AND ALBUTEROL SULFATE 2.5; .5 MG/3ML; MG/3ML
3 SOLUTION RESPIRATORY (INHALATION)
Status: DISCONTINUED | OUTPATIENT
Start: 2024-09-17 | End: 2024-09-17

## 2024-09-17 RX ORDER — MORPHINE SULFATE 0.5 MG/ML
INJECTION, SOLUTION EPIDURAL; INTRATHECAL; INTRAVENOUS
Status: DISCONTINUED | OUTPATIENT
Start: 2024-09-17 | End: 2024-09-17

## 2024-09-17 RX ORDER — METHYLERGONOVINE MALEATE 0.2 MG/ML
200 INJECTION INTRAVENOUS ONCE AS NEEDED
Status: DISCONTINUED | OUTPATIENT
Start: 2024-09-17 | End: 2024-09-19 | Stop reason: HOSPADM

## 2024-09-17 RX ORDER — IBUPROFEN 200 MG
16 TABLET ORAL
Status: DISCONTINUED | OUTPATIENT
Start: 2024-09-17 | End: 2024-09-19 | Stop reason: HOSPADM

## 2024-09-17 RX ORDER — OXYTOCIN-SODIUM CHLORIDE 0.9% IV SOLN 30 UNIT/500ML 30-0.9/5 UT/ML-%
95 SOLUTION INTRAVENOUS CONTINUOUS PRN
Status: DISCONTINUED | OUTPATIENT
Start: 2024-09-17 | End: 2024-09-17

## 2024-09-17 RX ORDER — ADHESIVE BANDAGE
30 BANDAGE TOPICAL 2 TIMES DAILY PRN
Status: DISCONTINUED | OUTPATIENT
Start: 2024-09-18 | End: 2024-09-19 | Stop reason: HOSPADM

## 2024-09-17 RX ORDER — CARBOPROST TROMETHAMINE 250 UG/ML
250 INJECTION, SOLUTION INTRAMUSCULAR
Status: DISCONTINUED | OUTPATIENT
Start: 2024-09-17 | End: 2024-09-19 | Stop reason: HOSPADM

## 2024-09-17 RX ORDER — ONDANSETRON HYDROCHLORIDE 2 MG/ML
4 INJECTION, SOLUTION INTRAVENOUS EVERY 12 HOURS PRN
Status: DISCONTINUED | OUTPATIENT
Start: 2024-09-17 | End: 2024-09-19 | Stop reason: HOSPADM

## 2024-09-17 RX ORDER — METHYLERGONOVINE MALEATE 0.2 MG/ML
200 INJECTION INTRAVENOUS
Status: DISCONTINUED | OUTPATIENT
Start: 2024-09-17 | End: 2024-09-17

## 2024-09-17 RX ORDER — DOCUSATE SODIUM 100 MG/1
200 CAPSULE, LIQUID FILLED ORAL 2 TIMES DAILY
Status: DISCONTINUED | OUTPATIENT
Start: 2024-09-17 | End: 2024-09-19 | Stop reason: HOSPADM

## 2024-09-17 RX ORDER — MISOPROSTOL 100 UG/1
800 TABLET ORAL
Status: DISCONTINUED | OUTPATIENT
Start: 2024-09-17 | End: 2024-09-17

## 2024-09-17 RX ORDER — FAMOTIDINE 10 MG/ML
20 INJECTION INTRAVENOUS
Status: DISCONTINUED | OUTPATIENT
Start: 2024-09-17 | End: 2024-09-17

## 2024-09-17 RX ORDER — MISOPROSTOL 100 UG/1
800 TABLET ORAL ONCE AS NEEDED
Status: DISCONTINUED | OUTPATIENT
Start: 2024-09-17 | End: 2024-09-19 | Stop reason: HOSPADM

## 2024-09-17 RX ORDER — CARBOPROST TROMETHAMINE 250 UG/ML
250 INJECTION, SOLUTION INTRAMUSCULAR
Status: DISCONTINUED | OUTPATIENT
Start: 2024-09-17 | End: 2024-09-17

## 2024-09-17 RX ORDER — ONDANSETRON HYDROCHLORIDE 2 MG/ML
INJECTION, SOLUTION INTRAVENOUS
Status: DISCONTINUED | OUTPATIENT
Start: 2024-09-17 | End: 2024-09-17

## 2024-09-17 RX ORDER — PROCHLORPERAZINE EDISYLATE 5 MG/ML
5 INJECTION INTRAMUSCULAR; INTRAVENOUS EVERY 6 HOURS PRN
Status: DISCONTINUED | OUTPATIENT
Start: 2024-09-17 | End: 2024-09-19 | Stop reason: HOSPADM

## 2024-09-17 RX ORDER — MUPIROCIN 20 MG/G
OINTMENT TOPICAL 2 TIMES DAILY
Status: DISCONTINUED | OUTPATIENT
Start: 2024-09-17 | End: 2024-09-19 | Stop reason: HOSPADM

## 2024-09-17 RX ORDER — SIMETHICONE 80 MG
1 TABLET,CHEWABLE ORAL EVERY 6 HOURS PRN
Status: DISCONTINUED | OUTPATIENT
Start: 2024-09-17 | End: 2024-09-19 | Stop reason: HOSPADM

## 2024-09-17 RX ORDER — METOCLOPRAMIDE HYDROCHLORIDE 5 MG/ML
10 INJECTION INTRAMUSCULAR; INTRAVENOUS ONCE
Status: COMPLETED | OUTPATIENT
Start: 2024-09-17 | End: 2024-09-17

## 2024-09-17 RX ORDER — NALOXONE HCL 0.4 MG/ML
0.04 VIAL (ML) INJECTION EVERY 5 MIN PRN
Status: DISCONTINUED | OUTPATIENT
Start: 2024-09-17 | End: 2024-09-19 | Stop reason: HOSPADM

## 2024-09-17 RX ORDER — OXYCODONE AND ACETAMINOPHEN 10; 325 MG/1; MG/1
1 TABLET ORAL EVERY 4 HOURS PRN
Status: DISCONTINUED | OUTPATIENT
Start: 2024-09-17 | End: 2024-09-19 | Stop reason: HOSPADM

## 2024-09-17 RX ORDER — ACETAMINOPHEN 10 MG/ML
INJECTION, SOLUTION INTRAVENOUS
Status: DISCONTINUED | OUTPATIENT
Start: 2024-09-17 | End: 2024-09-17

## 2024-09-17 RX ORDER — BUPIVACAINE HYDROCHLORIDE 7.5 MG/ML
INJECTION, SOLUTION EPIDURAL; RETROBULBAR
Status: COMPLETED | OUTPATIENT
Start: 2024-09-17 | End: 2024-09-17

## 2024-09-17 RX ORDER — ONDANSETRON 4 MG/1
8 TABLET, ORALLY DISINTEGRATING ORAL EVERY 8 HOURS PRN
Status: DISCONTINUED | OUTPATIENT
Start: 2024-09-17 | End: 2024-09-19 | Stop reason: HOSPADM

## 2024-09-17 RX ORDER — PROCHLORPERAZINE EDISYLATE 5 MG/ML
5 INJECTION INTRAMUSCULAR; INTRAVENOUS EVERY 30 MIN PRN
Status: DISCONTINUED | OUTPATIENT
Start: 2024-09-17 | End: 2024-09-17

## 2024-09-17 RX ORDER — LIDOCAINE HYDROCHLORIDE 10 MG/ML
1 INJECTION, SOLUTION EPIDURAL; INFILTRATION; INTRACAUDAL; PERINEURAL ONCE
Status: DISCONTINUED | OUTPATIENT
Start: 2024-09-17 | End: 2024-09-17

## 2024-09-17 RX ORDER — GLUCAGON 1 MG
1 KIT INJECTION
Status: DISCONTINUED | OUTPATIENT
Start: 2024-09-17 | End: 2024-09-17

## 2024-09-17 RX ORDER — INSULIN ASPART 100 [IU]/ML
0-10 INJECTION, SOLUTION INTRAVENOUS; SUBCUTANEOUS
Status: DISCONTINUED | OUTPATIENT
Start: 2024-09-17 | End: 2024-09-19 | Stop reason: HOSPADM

## 2024-09-17 RX ORDER — OXYTOCIN-SODIUM CHLORIDE 0.9% IV SOLN 30 UNIT/500ML 30-0.9/5 UT/ML-%
30 SOLUTION INTRAVENOUS ONCE AS NEEDED
Status: DISCONTINUED | OUTPATIENT
Start: 2024-09-17 | End: 2024-09-19 | Stop reason: HOSPADM

## 2024-09-17 RX ORDER — KETOROLAC TROMETHAMINE 30 MG/ML
INJECTION, SOLUTION INTRAMUSCULAR; INTRAVENOUS
Status: DISCONTINUED | OUTPATIENT
Start: 2024-09-17 | End: 2024-09-17

## 2024-09-17 RX ORDER — ONDANSETRON HYDROCHLORIDE 2 MG/ML
4 INJECTION, SOLUTION INTRAVENOUS DAILY PRN
Status: DISCONTINUED | OUTPATIENT
Start: 2024-09-17 | End: 2024-09-17

## 2024-09-17 RX ORDER — IBUPROFEN 200 MG
24 TABLET ORAL
Status: DISCONTINUED | OUTPATIENT
Start: 2024-09-17 | End: 2024-09-19 | Stop reason: HOSPADM

## 2024-09-17 RX ORDER — IBUPROFEN 600 MG/1
600 TABLET ORAL EVERY 6 HOURS
Status: DISCONTINUED | OUTPATIENT
Start: 2024-09-18 | End: 2024-09-19 | Stop reason: HOSPADM

## 2024-09-17 RX ORDER — OXYTOCIN 10 [USP'U]/ML
INJECTION, SOLUTION INTRAMUSCULAR; INTRAVENOUS
Status: DISCONTINUED | OUTPATIENT
Start: 2024-09-17 | End: 2024-09-17

## 2024-09-17 RX ORDER — SODIUM CITRATE AND CITRIC ACID MONOHYDRATE 334; 500 MG/5ML; MG/5ML
30 SOLUTION ORAL
Status: DISCONTINUED | OUTPATIENT
Start: 2024-09-17 | End: 2024-09-17

## 2024-09-17 RX ORDER — SODIUM CITRATE AND CITRIC ACID MONOHYDRATE 334; 500 MG/5ML; MG/5ML
30 SOLUTION ORAL ONCE
Status: COMPLETED | OUTPATIENT
Start: 2024-09-17 | End: 2024-09-17

## 2024-09-17 RX ORDER — MORPHINE SULFATE 4 MG/ML
2 INJECTION, SOLUTION INTRAMUSCULAR; INTRAVENOUS
Status: ACTIVE | OUTPATIENT
Start: 2024-09-17 | End: 2024-09-18

## 2024-09-17 RX ORDER — KETOROLAC TROMETHAMINE 30 MG/ML
30 INJECTION, SOLUTION INTRAMUSCULAR; INTRAVENOUS EVERY 8 HOURS
Status: ACTIVE | OUTPATIENT
Start: 2024-09-17 | End: 2024-09-18

## 2024-09-17 RX ORDER — PRENATAL WITH FERROUS FUM AND FOLIC ACID 3080; 920; 120; 400; 22; 1.84; 3; 20; 10; 1; 12; 200; 27; 25; 2 [IU]/1; [IU]/1; MG/1; [IU]/1; MG/1; MG/1; MG/1; MG/1; MG/1; MG/1; UG/1; MG/1; MG/1; MG/1; MG/1
1 TABLET ORAL DAILY
Status: DISCONTINUED | OUTPATIENT
Start: 2024-09-17 | End: 2024-09-19 | Stop reason: HOSPADM

## 2024-09-17 RX ORDER — SODIUM CHLORIDE 0.9 % (FLUSH) 0.9 %
10 SYRINGE (ML) INJECTION
Status: DISCONTINUED | OUTPATIENT
Start: 2024-09-17 | End: 2024-09-19 | Stop reason: HOSPADM

## 2024-09-17 RX ORDER — HYDROMORPHONE HYDROCHLORIDE 2 MG/ML
0.2 INJECTION, SOLUTION INTRAMUSCULAR; INTRAVENOUS; SUBCUTANEOUS EVERY 5 MIN PRN
Status: DISCONTINUED | OUTPATIENT
Start: 2024-09-17 | End: 2024-09-17

## 2024-09-17 RX ORDER — OXYTOCIN-SODIUM CHLORIDE 0.9% IV SOLN 30 UNIT/500ML 30-0.9/5 UT/ML-%
10 SOLUTION INTRAVENOUS ONCE AS NEEDED
Status: DISCONTINUED | OUTPATIENT
Start: 2024-09-17 | End: 2024-09-17

## 2024-09-17 RX ORDER — OXYTOCIN-SODIUM CHLORIDE 0.9% IV SOLN 30 UNIT/500ML 30-0.9/5 UT/ML-%
95 SOLUTION INTRAVENOUS CONTINUOUS PRN
Status: DISCONTINUED | OUTPATIENT
Start: 2024-09-17 | End: 2024-09-19 | Stop reason: HOSPADM

## 2024-09-17 RX ORDER — PHENYLEPHRINE HYDROCHLORIDE 10 MG/ML
INJECTION INTRAVENOUS
Status: DISCONTINUED | OUTPATIENT
Start: 2024-09-17 | End: 2024-09-17

## 2024-09-17 RX ADMIN — SODIUM CHLORIDE, POTASSIUM CHLORIDE, SODIUM LACTATE AND CALCIUM CHLORIDE 1000 ML: 600; 310; 30; 20 INJECTION, SOLUTION INTRAVENOUS at 07:09

## 2024-09-17 RX ADMIN — OXYTOCIN 30 UNITS: 10 INJECTION, SOLUTION INTRAMUSCULAR; INTRAVENOUS at 09:09

## 2024-09-17 RX ADMIN — SODIUM CHLORIDE, POTASSIUM CHLORIDE, SODIUM LACTATE AND CALCIUM CHLORIDE: 600; 310; 30; 20 INJECTION, SOLUTION INTRAVENOUS at 08:09

## 2024-09-17 RX ADMIN — ACETAMINOPHEN 1000 MG: 10 INJECTION, SOLUTION INTRAVENOUS at 10:09

## 2024-09-17 RX ADMIN — PHENYLEPHRINE HYDROCHLORIDE 100 MCG: 10 INJECTION INTRAVENOUS at 09:09

## 2024-09-17 RX ADMIN — KETOROLAC TROMETHAMINE 30 MG: 30 INJECTION, SOLUTION INTRAMUSCULAR at 04:09

## 2024-09-17 RX ADMIN — BUPIVACAINE HYDROCHLORIDE 1.8 ML: 7.5 INJECTION, SOLUTION EPIDURAL; RETROBULBAR at 08:09

## 2024-09-17 RX ADMIN — FENTANYL CITRATE 10 MCG: 50 INJECTION, SOLUTION INTRAMUSCULAR; INTRAVENOUS at 08:09

## 2024-09-17 RX ADMIN — Medication 95 MILLI-UNITS/MIN: at 11:09

## 2024-09-17 RX ADMIN — KETOROLAC TROMETHAMINE 30 MG: 30 INJECTION, SOLUTION INTRAMUSCULAR; INTRAVENOUS at 10:09

## 2024-09-17 RX ADMIN — METOCLOPRAMIDE 10 MG: 5 INJECTION, SOLUTION INTRAMUSCULAR; INTRAVENOUS at 07:09

## 2024-09-17 RX ADMIN — SODIUM CHLORIDE, POTASSIUM CHLORIDE, SODIUM LACTATE AND CALCIUM CHLORIDE: 600; 310; 30; 20 INJECTION, SOLUTION INTRAVENOUS at 09:09

## 2024-09-17 RX ADMIN — SODIUM CHLORIDE, POTASSIUM CHLORIDE, SODIUM LACTATE AND CALCIUM CHLORIDE: 600; 310; 30; 20 INJECTION, SOLUTION INTRAVENOUS at 07:09

## 2024-09-17 RX ADMIN — MORPHINE SULFATE 0.12 MG: 0.5 INJECTION, SOLUTION EPIDURAL; INTRATHECAL; INTRAVENOUS at 08:09

## 2024-09-17 RX ADMIN — PHENYLEPHRINE HYDROCHLORIDE 200 MCG: 10 INJECTION INTRAVENOUS at 09:09

## 2024-09-17 RX ADMIN — INSULIN ASPART 1 UNITS: 100 INJECTION, SOLUTION INTRAVENOUS; SUBCUTANEOUS at 09:09

## 2024-09-17 RX ADMIN — CEFAZOLIN SODIUM 2 G: 2 SOLUTION INTRAVENOUS at 09:09

## 2024-09-17 RX ADMIN — ONDANSETRON 4 MG: 2 INJECTION INTRAMUSCULAR; INTRAVENOUS at 08:09

## 2024-09-17 RX ADMIN — SODIUM CITRATE AND CITRIC ACID MONOHYDRATE 30 ML: 500; 334 SOLUTION ORAL at 07:09

## 2024-09-17 RX ADMIN — PHENYLEPHRINE HYDROCHLORIDE 100 MCG: 10 INJECTION INTRAVENOUS at 08:09

## 2024-09-17 NOTE — PROGRESS NOTES
Post Partum  Delivery Progress Note:     Subjective  Ruby Angel is 39 y.o.  s/p RLTCS at 38w0d in the setting of GDMA2, POD/PPD #1.   Patient resting comfortably in bed. No complaints overnight. Pain is well controlled. Lochia similar to her menses. Tolerating regular diet without nausea/vomiting. Voiding clear urine and passing flatus. Ambulating. Infant at bedside. Denies headache, blurry vision, dizziness, chest pain, shortness of breath, RUQ pain, or calf pain.     Objective:  Vitals:    24 0013 24 0442 24 0734 24 0809   BP: 113/70 134/85 116/72    BP Location:       Patient Position:       Pulse: 88 90 87    Resp:   17 18   Temp: 98 °F (36.7 °C) 98.4 °F (36.9 °C) 98.4 °F (36.9 °C)    TempSrc: Oral Oral Oral    SpO2: 97% 98% 98%    Weight:       Height:           General:  Alert and oriented, in no acute distress   Lungs:  Clear to auscultation bilaterally   Heart::  Regular rate and rhythm   Abdomen  Soft, appropriately tender post-op, nondistended, normoactive bowel sounds    Pelvic:  Scant blood present on pad    Uterine Fundus:   Firm, below the umbilicus    Incision:  Bandage with minimal shadowing, dry, intact   Extremities:  No erythema, cords, or tenderness to palpation in bilateral extremities, trace edema       Intake/Output Summary (Last 24 hours) at 2024 0816  Last data filed at 2024 1800  Gross per 24 hour   Intake 1850 ml   Output 1170 ml   Net 680 ml       Recent Labs   Lab 24  0648 24  0440   WBC 10.31 13.09*   HGB 12.4 11.5*    231   MCV 84.3 84.0       Medications   docusate sodium  200 mg Oral BID    ibuprofen  600 mg Oral Q6H    mupirocin   Nasal BID    prenatal vitamin  1 tablet Oral Daily      oxytocin  95 eric-units/min Intravenous Continuous PRN           Current Facility-Administered Medications:     bisacodyL, 10 mg, Rectal, Once PRN    carboprost, 250 mcg, Intramuscular, Q15 Min PRN    dextrose 10%, 12.5 g,  Intravenous, PRN    dextrose 10%, 25 g, Intravenous, PRN    diphenoxylate-atropine 2.5-0.025 mg, 2 tablet, Oral, Q6H PRN    glucagon (human recombinant), 1 mg, Intramuscular, PRN    glucose, 16 g, Oral, PRN    glucose, 24 g, Oral, PRN    insulin aspart U-100, 0-10 Units, Subcutaneous, QID (AC + HS) PRN    lanolin, , Topical (Top), PRN    magnesium hydroxide 400 mg/5 ml, 30 mL, Oral, BID PRN    measles, mumps and rubella vaccine, 0.5 mL, Subcutaneous, vaccine x 1 dose    methylergonovine, 200 mcg, Intramuscular, Once PRN    miSOPROStoL, 800 mcg, Rectal, Once PRN    miSOPROStoL, 800 mcg, Oral, Once PRN    morphine, 2 mg, Intravenous, Q3H PRN    naloxone, 0.04 mg, Intravenous, Q5 Min PRN    naloxone, 0.04 mg, Intravenous, PRN    ondansetron, 8 mg, Oral, Q8H PRN    ondansetron, 4 mg, Intravenous, Q12H PRN    oxyCODONE-acetaminophen, 1 tablet, Oral, Q4H PRN    oxytocin, 95 eric-units/min, Intravenous, Continuous PRN    oxytocin, 30 Units, Intravenous, Once PRN    oxytocin, 10 Units, Intramuscular, Once PRN    prochlorperazine, 5 mg, Intravenous, Q6H PRN    senna-docusate 8.6-50 mg, 1 tablet, Oral, Nightly PRN    simethicone, 1 tablet, Oral, Q6H PRN    sodium chloride 0.9%, 10 mL, Intravenous, PRN    DIPH,PERTUSS(ACELL),TET VACCINE (ADULT)(BOOSTRIX,ADACEL), 0.5 mL, Intramuscular, vaccine x 1 dose    tranexamic acid (CYKLOKAPRON) infusion, 1,000 mg, Intravenous, Q30 Min PRN    Assessment/Plan  Ruby Angel is a 39 y.o.   s/p RLTCS , POD # 1. Doing well. Pregnancy/Postpartum course complicated by GDMA2, CHTN, H/o pre eclampsia, HSV2, multinodular goiter, depression, anxiety, ADHD, and AMA.      Postoperative state: Regular Diet. Discontinue IVF. Discontinue Siegel. Encourage ambulation once siegel removed. Continue routine postpartum/postop care.   Asymptomatic anemia:   Antepartum H/H 12.4/37.1 -->  mL--> postpartum H/H 11.5/35.3.   No signs/symptoms of anemia this AM, continue to monitor  CHTN: BP  normotensive since admit. Will continue to monitor  GDMA2: CBGs wnl  Multinodular Goiter: TSH wnl, no concerns at this time  HSV2: no concerns at this time   Anxiety and Depression: mood stable  Feeding: Bottle  Pain: Scheduled Toradol --->ibuprofen, PRN percocet for breakthrough pain  Prophylaxis: SCD, Incentive Spirometry  PPBCM: declines   Dispo: continue to monitor, anticipate discharge to home on POD#3 if meeting all goals.    Discussed with Dr. Karthikeyan Jarvis MD, PGY-2  [facility]

## 2024-09-17 NOTE — PLAN OF CARE
Problem: Adult Inpatient Plan of Care  Goal: Plan of Care Review  Outcome: Progressing  Goal: Patient-Specific Goal (Individualized)  Outcome: Progressing  Goal: Absence of Hospital-Acquired Illness or Injury  Outcome: Progressing  Goal: Optimal Comfort and Wellbeing  Outcome: Progressing  Goal: Readiness for Transition of Care  Outcome: Progressing     Problem: Diabetes Comorbidity  Goal: Blood Glucose Level Within Targeted Range  Outcome: Progressing     Problem: Diabetes Comorbidity  Goal: Blood Glucose Level Within Targeted Range  Outcome: Progressing     Problem: Infection  Goal: Absence of Infection Signs and Symptoms  Outcome: Progressing     Problem:  Fall Injury Risk  Goal: Absence of Fall, Infant Drop and Related Injury  Outcome: Progressing

## 2024-09-17 NOTE — ANESTHESIA PREPROCEDURE EVALUATION
"                                                                                                             2024  Ruby Angel is a 39 y.o., female with gestational diabetes and IUP admitted this morning for repeat  section.  Vital signs reviewed and awaiting nursery    Last 3 sets of Vitals        2024     1:04 PM 9/3/2024     8:31 AM 9/10/2024     9:31 AM   Vitals - 1 value per visit   SYSTOLIC 115 105 113   DIASTOLIC 76 77 73   Pulse 116 110 104   Temp 36.9 °C (98.4 °F) 36.6 °C (97.9 °F) 36.8 °C (98.2 °F)   Resp 20 20 20   SPO2 98 % 99 % 98 %   Weight (lb)  160.8 162.6   Weight (kg)  72.938 73.755   Height  5' 4" (1.626 m) 5' 4" (1.626 m)   BMI (Calculated)  27.6 27.9   Pain Score Zero Zero Zero         Lab Results   Component Value Date    WBC 8.09 2024    HGB 12.6 2024    HCT 39.7 2024    MCV 85.6 2024     2024          BMP  Lab Results   Component Value Date     2024    K 4.2 2024     2024    CO2 23 2024    BUN 8.9 2024    CREATININE 0.77 2024    CALCIUM 8.9 2024    EGFRNONAA 100 2021    Pre-op Assessment    I have reviewed the Patient Summary Reports.    I have reviewed the NPO Status.   I have reviewed the Medications.     Review of Systems  Anesthesia Hx:               Denies Personal Hx of Anesthesia complications.                    Social:  Non-Smoker       Cardiovascular:     Hypertension                Functional Capacity good / => 4 METS                         OB/GYN/PEDS:      Issues with Current Pregnancy  are Gestational Diabetes, Insulin controlled                  Physical Exam  General: Well nourished, Cooperative, Alert and Oriented    Airway:  Mallampati: II   Mouth Opening: Normal  TM Distance: Normal  Tongue: Normal  Neck ROM: Normal ROM    Dental:  Intact    Chest/Lungs:  Clear to auscultation, Normal Respiratory Rate    Heart:  Rate: Normal  Rhythm: Regular " Rhythm        Anesthesia Plan  Type of Anesthesia, risks & benefits discussed:    Anesthesia Type: Epidural  Post Op Pain Control Plan: epidural analgesia  Informed Consent: Informed consent signed with the Patient and all parties understand the risks and agree with anesthesia plan.  All questions answered.   ASA Score: 2  Day of Surgery Review of History & Physical: H&P Update referred to the surgeon/provider.    Ready For Surgery From Anesthesia Perspective.     .

## 2024-09-17 NOTE — H&P
"   HISTORY AND PHYSICAL                                                OBSTETRICS          Subjective:      Ruby Angel is a 39 y.o.  female with IUP at 38w0d weeks gestation who presents to L&D for repeat  section. Pertinent medical history for this pregnancy includes GDMA2, CHTN, H/o pre eclampsia, HSV2, multinodular goiter, depression, anxiety, ADHD, AMA.  She denies contractions, vaginal bleeding, leakage of fluid. Reports normal fetal movement. Care this pregnancy has been with  [facility] .    PMHx:   Past Medical History:   Diagnosis Date    Abnormal Pap smear of cervix     Anemia     Anxiety     Depression     Diabetes mellitus     Herpes simplex virus (HSV) infection     Hypertension     Postpartum depression     Vitamin D deficiency        PSHx:   Past Surgical History:   Procedure Laterality Date     SECTION      COLONOSCOPY W/ BIOPSIES AND POLYPECTOMY  2018    DILATION AND CURETTAGE OF UTERUS  2018       All:   Review of patient's allergies indicates:   Allergen Reactions    Benadryl allergy-sinus Shortness Of Breath    Diphenhydramine hcl Swelling    Diphenhyd-pe-acetaminophen        Meds:   Medications Prior to Admission   Medication Sig Dispense Refill Last Dose    aspirin (ECOTRIN) 81 MG EC tablet Take 1 tablet (81 mg total) by mouth once daily. 30 tablet 0     BD ULTRA-FINE MINI PEN NEEDLE 31 gauge x 3/16" Ndle Inject into the skin.       blood sugar diagnostic ( BLOOD GLUCOSE TEST STRIP) Strp 1 each by Misc.(Non-Drug; Combo Route) route 4 (four) times daily. 100 each 3     insulin aspart U-100 (NOVOLOG FLEXPEN U-100 INSULIN) 100 unit/mL (3 mL) InPn pen Inject 6u with lunch and dinner each day 12 mL 1     lancets 33 gauge Misc Apply topically 2 (two) times daily.       LANTUS SOLOSTAR U-100 INSULIN 100 unit/mL (3 mL) InPn pen Inject into the skin once daily.       metFORMIN (GLUCOPHAGE) 1000 MG tablet TAKE 1-1/2 TABLETS (OR 1500MG TOTAL) BY MOUTH DAILY " WITH BREAKFAST OR W/ DIABETIC SNACK 45 tablet 3     PNV,calcium 72/iron/folic acid (PRENATAL VITAMIN) Tab Take 1 tablet by mouth once daily. 30 tablet 2     TRUE METRIX GLUCOSE METER Misc USE AS INSTRUCTED       valACYclovir (VALTREX) 500 MG tablet Take 1 tablet (500 mg total) by mouth 2 (two) times daily. 60 tablet 0        SH:   Social History     Socioeconomic History    Marital status:    Tobacco Use    Smoking status: Former     Current packs/day: 0.50     Average packs/day: 0.5 packs/day for 6.9 years (3.4 ttl pk-yrs)     Types: Cigarettes     Start date: 11/10/2017     Passive exposure: Never    Smokeless tobacco: Never   Substance and Sexual Activity    Alcohol use: Not Currently     Alcohol/week: 1.0 standard drink of alcohol     Types: 1 Glasses of wine per week     Comment: Occasionally    Drug use: Never    Sexual activity: Yes     Partners: Male     Birth control/protection: Abstinence     Social Determinants of Health     Financial Resource Strain: Medium Risk (3/10/2024)    Overall Financial Resource Strain (CARDIA)     Difficulty of Paying Living Expenses: Somewhat hard   Food Insecurity: Food Insecurity Present (3/10/2024)    Hunger Vital Sign     Worried About Running Out of Food in the Last Year: Sometimes true     Ran Out of Food in the Last Year: Never true   Transportation Needs: No Transportation Needs (3/10/2024)    PRAPARE - Transportation     Lack of Transportation (Medical): No     Lack of Transportation (Non-Medical): No   Physical Activity: Sufficiently Active (3/10/2024)    Exercise Vital Sign     Days of Exercise per Week: 3 days     Minutes of Exercise per Session: 60 min   Stress: No Stress Concern Present (3/10/2024)    [employer] [place] [employer] - Occupational Stress Questionnaire     Feeling of Stress : Only a little   Housing Stability: Low Risk  (3/10/2024)    Housing Stability Vital Sign     Unable to Pay for Housing in the Last Year: No     Number of  Places Lived in the Last Year: 2     Unstable Housing in the Last Year: No       FH:   Family History   Problem Relation Name Age of Onset    Hypertension Father Chris Hood     Diabetes Father Chris Hood     Kidney disease Father Chris Hood     Hypertension Mother Brennon Pérez     Diabetes Mother Brennon Pérez     Heart disease Mother Brennon Pérez     Cervical cancer Mother Brennon Pérez     Kidney failure Brother      Kidney disease Brother Priscila Hood     Autoimmune disease Sister         OBHx:   OB History    Para Term  AB Living   4 1 1 0 2 1   SAB IAB Ectopic Multiple Live Births   2 0 0 0 1      # Outcome Date GA Lbr Luis/2nd Weight Sex Type Anes PTL Lv   4 Current            3 Term 10/29/21 39w4d  3.09 kg (6 lb 13 oz) M CS-Unspec EPI N ESTUARDO      Complications: Failure to Progress in Second Stage   2 SAB 2018 10w0d    SAB         Birth Comments: D&C   1 SAB 2018 5w0d    SAB          Objective:      LMP 2023 (Exact Date)   There is no height or weight on file to calculate BMI.    General:   alert and cooperative   HEENT:  normocephalic, atraumatic   Lungs:   clear to auscultation bilaterally   Heart:   regular rate and rhythm, S1, S2 normal   Abdomen:  gravid, non-tender, leopolds 7   Extremities non-tender, no edema   Psych: appropriate mood and affect       EFM: Cat 1, modBTV, +accel, no decel (reassuring, reactive)  TOCO: irregular ctx     Lab Review  Blood Type A POS  GBBS: positive   Rubella: immumne  RPR: NR  HIV: Negative  HepB:   Hep BsAg Interp   Date Value Ref Range Status   2024 Nonreactive Nonreactive Final        Assessment:     39 y.o.  at 38w0d weeks gestation here for repeat  delivery  2. H/o  delivery x 1.     Plan:        1. Risks, benefits, alternatives and possible complications have been discussed in detail with the patient. All questions have been answered, and Ms. Angel has voiced understanding and agrees to the  treatment plan.  2. Consents signed and in chart  3. Admit to Labor and Delivery unit for repeat  delivery    To be discussed with Dr. Karthikeyan Jarvis MD, PGY-2  [facility]

## 2024-09-17 NOTE — PLAN OF CARE
"  Problem: Adult Inpatient Plan of Care  Goal: Plan of Care Review  Outcome: Progressing  Goal: Patient-Specific Goal (Individualized)  Description: "I want to go home with a healthy baby."  2024 1252 by Noa Vargas RN  Outcome: Progressing  2024 1252 by Noa Vargas RN  Flowsheets (Taken 2024 1252)  Patient/Family-Specific Goals (Include Timeframe): "I want to go home with a healthy baby."  Goal: Absence of Hospital-Acquired Illness or Injury  Outcome: Progressing  Goal: Optimal Comfort and Wellbeing  Outcome: Progressing  Goal: Readiness for Transition of Care  Outcome: Progressing     Problem: Diabetes Comorbidity  Goal: Blood Glucose Level Within Targeted Range  Outcome: Progressing     Problem: Infection  Goal: Absence of Infection Signs and Symptoms  Outcome: Progressing     Problem:  Fall Injury Risk  Goal: Absence of Fall, Infant Drop and Related Injury  Outcome: Progressing     Problem: Wound  Goal: Optimal Coping  Outcome: Progressing  Goal: Optimal Functional Ability  Outcome: Progressing  Goal: Absence of Infection Signs and Symptoms  Outcome: Progressing  Goal: Improved Oral Intake  Outcome: Progressing  Goal: Optimal Pain Control and Function  Outcome: Progressing  Goal: Skin Health and Integrity  Outcome: Progressing  Goal: Optimal Wound Healing  Outcome: Progressing     Problem: Postpartum ( Delivery)  Goal: Successful Parent Role Transition  Outcome: Progressing  Goal: Hemostasis  Outcome: Progressing  Goal: Effective Bowel Elimination  Outcome: Progressing  Goal: Fluid and Electrolyte Balance  Outcome: Progressing  Goal: Absence of Infection Signs and Symptoms  Outcome: Progressing  Goal: Anesthesia/Sedation Recovery  Outcome: Progressing  Goal: Optimal Pain Control and Function  Outcome: Progressing  Goal: Nausea and Vomiting Relief  Outcome: Progressing  Goal: Effective Urinary Elimination  Outcome: Progressing  Goal: Effective Oxygenation and " Ventilation  Outcome: Progressing

## 2024-09-17 NOTE — L&D DELIVERY NOTE
[facility] - Labor and Delivery   Section   Operative Note    SUMMARY     Date of Procedure: 2024     Procedure: Procedure(s) (LRB):   SECTION (N/A)    Surgeons and Role:     * Garth Napier MD - Primary     * Rebecca Jarvis MD - Resident  Assisting Surgeon: None    Pre-Operative Diagnosis: Previous  section [Z98.891]  Gestational diabetes [O24.419]    Post-Operative Diagnosis: Post-Op Diagnosis Codes:     * Previous  section [Z98.891]     * Gestational diabetes [O24.419]    Anesthesia: Spinal/Epidural    Technical Procedures Used: RLTCS           Description of the Findings of the Procedure: Female infant delivered weighing 7 lb and 9 oz, normal uterus, fallopian tubes, and ovaries     Significant Surgical Tasks Conducted by the Assistant(s), if Applicable: none    Complications: No    Blood Loss: 520 mL    Indication and Consent:   Patient is a 39 y.o. yo  at 38w0d who presented to labor and delivery for scheduled repeat low transverse  section. The patient understood that the risks of  section include, but are not limited, visceral or vascular injury, infection, blood loss with need for transfusion, prolonged hospitalization, and need for reoperation. The patient stated understanding and desired to proceed. All questions were answered.     Procedure:   Patient was taken to the operating room with IV fluids running. Spinal anesthesia was obtained without difficulty. She was positioned in the dorsal supine position. Ancef 2g was administered. Otero catheter was placed to drain the bladder, and Bovie grounding pad placed to the left thigh. FHTs were obtained. She was prepared and draped in the normal sterile fashion. A time-out was performed.       A pfannenstiel incision was made with the scalpel. The incision was carried down to the fascia with the scalpel. The fascia was incised and extended laterally. The superior aspect of the  fascia was grasped with the Kocher clamps, and the underlying rectus muscles were dissected off sharply with the Peters scissors. In a similar fashion, the inferior aspect of the fascia was grasped and elevated, and the underlying rectus and pyramidalis muscles were dissected off sharply. The rectus muscles were  in the midline down to the level of the pubic symphysis. Pre-peritoneal fatty tissue was bluntly dissected to expose the peritoneum. The peritoneum was found to be free of adherent bowel and entered bluntly. The peritoneal incision was extended with blunt traction.       The bladder blade was inserted. A bladder flap was developed with the Metzenbaum scissors. The bladder blade was repositioned to keep the bladder out of the operative field. A transverse incision was made on the lower uterine segment using the scalpel. The incision was extended bluntly with lateral and upward traction. Membranes were ruptured with a hemostat and clear fluid was noted.       The fetus was found to be vertex presentation. The head was elevated out of the pelvis, and with gentle fundal pressure the infant was delivered with no difficulty. Mouth and nose were suctioned with a bulb. The cord was clamped x2 and cut. The infant was handed off to the awaiting  nurse practitioner. Cord blood was collected. The placenta was delivered intact by manual extraction. The uterus was then exteriorized. The inside of the uterus was gently wiped with a lap sponge to assure complete removal of placental membranes. The angles of the hysterotomy were identified and clamped with Penningtons. The hysterotomy was closed with 1-0 Chromic suture in a running and locked fashion. The incision was inspected and found to be hemostatic. Bilateral fallopian tubes and ovaries were normal in appearance.       The uterus, tubes, and ovaries were returned to the abdomen. Blood clots and fluid were wiped out of the abdomen and pelvis with moist  "laparotomy sponges and guzman suction. The uterine incision was again inspected and found to be hemostatic following figure of eight sutures.       The peritoneum was closed with 2-0 Chromic suture in a continuous running fashion. The rectus muscles were reapproximated with 2-0 Chromic suture in a U-stitch. The rectus muscles were inspected, and hemostasis was achieved with a bovie. The fascial layer was closed with 1 Vicryl suture in a continuous running fashion. The subcutaneous layer was irrigated, and hemostasis was achieved with the Bovie. The subcutaneous layer was reapproximated with 2-0 plain gut suture in a simple interrupted fashion. The skin was closed with 4-0 Monocryl suture in a subcuticular fashion.       The patient tolerated the procedure well. All counts were correct x2. The patient was taken to the recovery room in a stable condition.       Dr. Napier was present and scrubbed for the entire procedure.            Specimens:   Specimen (24h ago, onward)      None            Condition: Good    VTE Risk Mitigation (From admission, onward)           Ordered     IP VTE LOW RISK PATIENT  Once         24     Place sequential compression device  Until discontinued         24                    Disposition: PACU - hemodynamically stable.    Attestation: Good         Delivery Information for Paramjit Angel    Birth information:  YOB: 2022   Time of birth: 9:34 AM   Sex: female   Head Delivery Date/Time: 2024  9:34 AM   Delivery type: , Low Transverse   Gestational Age: 38w0d        Delivery Providers    Delivering clinician: Garth Napier MD   Provider Role    Curt Sims, RN Registered Nurse              Measurements    Weight: 3430 g  Weight (lbs): 7 lb 9 oz  Length: 48.3 cm  Length (in): 19"  Head circumference: 35 cm         Apgars    Living status: Living  Apgar Component Scores:  1 min.:  5 min.:  10 min.:  15 min.:  20 min.:    Skin " color:  0  1       Heart rate:  2  2       Reflex irritability:  2  2       Muscle tone:  2  2       Respiratory effort:  2  2       Total:  8  9       Apgars assigned by: RASHAWN PAINTING RN         Operative Delivery    Forceps attempted?: No  Vacuum extractor attempted?: No         Shoulder Dystocia    Shoulder dystocia present?: No           Presentation    Presentation: Vertex  Position: Right Occiput           Interventions/Resuscitation    Method: Bulb Suctioning, Tactile Stimulation       Cord    Vessels: 3 vessels  Complications: None  Delayed Cord Clamping?: Yes  Cord Blood Disposition: Sent with Baby  Gases Sent?: No  Stem Cell Collection (by MD): No       Placenta    Placenta delivery date/time: 2024  Placenta removal: Manual removal  Placenta appearance: Intact  Placenta disposition: Discarded           Labor Events:       labor: No     Labor Onset Date/Time:         Dilation Complete Date/Time:         Start Pushing Date/Time:       Rupture Date/Time: 24         Rupture type: ARM (Artificial Rupture)         Fluid Amount:       Fluid Color: Clear       steroids: None     Antibiotics given for GBS:       Induction:       Indications for induction:        Augmentation:       Indications for augmentation:       Labor complications:       Additional complications:          Cervical ripening:                     Delivery:      Episiotomy:       Indication for Episiotomy:       Perineal Lacerations:   Repaired:      Periurethral Laceration:   Repaired:     Labial Laceration:   Repaired:     Sulcus Laceration:   Repaired:     Vaginal Laceration:   Repaired:     Cervical Laceration:   Repaired:     Repair suture:       Repair # of packets:       Last Value - EBL - Nursing (mL):       Sum - EBL - Nursing (mL): 0     Last Value - EBL - Anesthesia (mL):      Calculated QBL (mL): 520      Running total QBL (mL): 520      Vaginal Sweep Performed:       Surgicount Correct:          Other providers:       Anesthesia    Method: Spinal          Details (if applicable):  Trial of Labor No    Categorization: Repeat    Priority: Routine   Indications for : Other (Add Comments);Repeat Section   Incision Type: low transverse     Additional  information:  Forceps:    Vacuum:    Breech:    Observed anomalies    Other (Comments):         Rebecca Jarvis MD, PGY-2  [employer] [facility]

## 2024-09-17 NOTE — ANESTHESIA PROCEDURE NOTES
Spinal    Diagnosis: Repeat C/S  Patient location during procedure: OB  Start time: 9/17/2024 8:50 AM  Timeout: 9/17/2024 8:49 AM  End time: 9/17/2024 8:55 AM    Staffing  Authorizing Provider: Lew Rowan Jr., MD  Performing Provider: Chaitanya Crandall CRNA    Staffing  Performed by: Chaitanya Crandall CRNA  Authorized by: Lew Rowan Jr., MD    Preanesthetic Checklist  Completed: patient identified, IV checked, site marked, risks and benefits discussed, surgical consent, monitors and equipment checked, pre-op evaluation and timeout performed  Spinal Block  Patient position: sitting  Prep: ChloraPrep  Patient monitoring: heart rate, continuous pulse ox and frequent blood pressure checks  Approach: midline  Location: L4-5  Injection technique: single shot  CSF Fluid: clear free-flowing CSF  Needle  Needle type: pencil-tip   Needle gauge: 25 G  Needle length: 3.5 in  Additional Documentation: negative aspiration for heme, incremental injection and no paresthesia on injection  Needle localization: anatomical landmarks  Assessment  Sensory level: T4   Dermatomal levels determined by alcohol wipe  Ease of block: moderate  Patient's tolerance of the procedure: comfortable throughout block and no complaints  Medications:    Medications: bupivacaine (pf) (MARCAINE) injection 0.75% - Intraspinal   1.8 mL - 9/17/2024 8:55:00 AM

## 2024-09-17 NOTE — TRANSFER OF CARE
"Anesthesia Transfer of Care Note    Patient: Ruby Angel    Procedure(s) Performed: Procedure(s) (LRB):   SECTION (N/A)    Patient location: Labor and Delivery    Anesthesia Type: spinal    Transport from OR: Transported from OR on room air with adequate spontaneous ventilation    Post pain: adequate analgesia    Post assessment: no apparent anesthetic complications and tolerated procedure well    Post vital signs: stable    Level of consciousness: awake, alert and oriented    Nausea/Vomiting: no nausea/vomiting    Complications: none    Transfer of care protocol was followed      Last vitals: Visit Vitals  /70   Pulse 108   Temp 36.9 °C (98.4 °F) (Oral)   Ht 5' 4" (1.626 m)   Wt 73 kg (161 lb)   LMP 2023 (Exact Date)   Breastfeeding No   BMI 27.64 kg/m²     "

## 2024-09-18 LAB
BASOPHILS # BLD AUTO: 0.03 X10(3)/MCL
BASOPHILS NFR BLD AUTO: 0.2 %
EOSINOPHIL # BLD AUTO: 0.06 X10(3)/MCL (ref 0–0.9)
EOSINOPHIL NFR BLD AUTO: 0.5 %
ERYTHROCYTE [DISTWIDTH] IN BLOOD BY AUTOMATED COUNT: 15.6 % (ref 11.5–17)
HCT VFR BLD AUTO: 35.3 % (ref 37–47)
HGB BLD-MCNC: 11.5 G/DL (ref 12–16)
IMM GRANULOCYTES # BLD AUTO: 0.07 X10(3)/MCL (ref 0–0.04)
IMM GRANULOCYTES NFR BLD AUTO: 0.5 %
LYMPHOCYTES # BLD AUTO: 1.57 X10(3)/MCL (ref 0.6–4.6)
LYMPHOCYTES NFR BLD AUTO: 12 %
MCH RBC QN AUTO: 27.4 PG (ref 27–31)
MCHC RBC AUTO-ENTMCNC: 32.6 G/DL (ref 33–36)
MCV RBC AUTO: 84 FL (ref 80–94)
MONOCYTES # BLD AUTO: 0.93 X10(3)/MCL (ref 0.1–1.3)
MONOCYTES NFR BLD AUTO: 7.1 %
NEUTROPHILS # BLD AUTO: 10.43 X10(3)/MCL (ref 2.1–9.2)
NEUTROPHILS NFR BLD AUTO: 79.7 %
NRBC BLD AUTO-RTO: 0 %
PLATELET # BLD AUTO: 231 X10(3)/MCL (ref 130–400)
PMV BLD AUTO: 11.1 FL (ref 7.4–10.4)
POCT GLUCOSE: 149 MG/DL (ref 70–110)
POCT GLUCOSE: 86
POCT GLUCOSE: 86 MG/DL (ref 70–110)
POCT GLUCOSE: 90 MG/DL (ref 70–110)
POCT GLUCOSE: 91
POCT GLUCOSE: 91 MG/DL (ref 70–110)
RBC # BLD AUTO: 4.2 X10(6)/MCL (ref 4.2–5.4)
WBC # BLD AUTO: 13.09 X10(3)/MCL (ref 4.5–11.5)

## 2024-09-18 PROCEDURE — 25000003 PHARM REV CODE 250

## 2024-09-18 PROCEDURE — 63600175 PHARM REV CODE 636 W HCPCS

## 2024-09-18 PROCEDURE — 11000001 HC ACUTE MED/SURG PRIVATE ROOM

## 2024-09-18 PROCEDURE — 36415 COLL VENOUS BLD VENIPUNCTURE: CPT

## 2024-09-18 PROCEDURE — 85025 COMPLETE CBC W/AUTO DIFF WBC: CPT

## 2024-09-18 RX ORDER — OXYCODONE AND ACETAMINOPHEN 5; 325 MG/1; MG/1
1 TABLET ORAL EVERY 4 HOURS PRN
Status: DISCONTINUED | OUTPATIENT
Start: 2024-09-18 | End: 2024-09-19 | Stop reason: HOSPADM

## 2024-09-18 RX ADMIN — PRENATAL VITAMINS-IRON FUMARATE 27 MG IRON-FOLIC ACID 0.8 MG TABLET 1 TABLET: at 08:09

## 2024-09-18 RX ADMIN — DOCUSATE SODIUM 200 MG: 100 CAPSULE, LIQUID FILLED ORAL at 08:09

## 2024-09-18 RX ADMIN — OXYCODONE AND ACETAMINOPHEN 1 TABLET: 10; 325 TABLET ORAL at 08:09

## 2024-09-18 RX ADMIN — OXYCODONE AND ACETAMINOPHEN 1 TABLET: 10; 325 TABLET ORAL at 05:09

## 2024-09-18 RX ADMIN — IBUPROFEN 600 MG: 600 TABLET, FILM COATED ORAL at 02:09

## 2024-09-18 RX ADMIN — IBUPROFEN 600 MG: 600 TABLET, FILM COATED ORAL at 08:09

## 2024-09-18 RX ADMIN — KETOROLAC TROMETHAMINE 30 MG: 30 INJECTION, SOLUTION INTRAMUSCULAR at 12:09

## 2024-09-18 NOTE — ANESTHESIA POSTPROCEDURE EVALUATION
Anesthesia Post Evaluation    Patient: Ruby Angel    Procedure(s) Performed: Procedure(s) (LRB):   SECTION (N/A)    Final Anesthesia Type: spinal      Patient location during evaluation: labor & delivery  Patient participation: Yes- Able to Participate  Level of consciousness: awake and alert  Post-procedure vital signs: reviewed and stable  Pain management: adequate  Airway patency: patent    PONV status at discharge: No PONV  Anesthetic complications: no      Cardiovascular status: blood pressure returned to baseline, hemodynamically stable and stable  Respiratory status: unassisted, spontaneous ventilation and room air  Hydration status: euvolemic  Follow-up not needed.  Comments: Spinal regressing and patient able to move legs/wiggle toes: to be discharged from PACU to Mother Baby when Criteria Met              Vitals Value Taken Time   /77 24 1150   Temp 36.8 °C (98.3 °F) 24 1150   Pulse 91 24 1150   Resp 18 24 0809   SpO2 98 % 24 1150         Event Time   Out of Recovery 11:55:00         Pain/Sandra Score: Pain Rating Prior to Med Admin: 10 (2024  8:09 AM)  Pain Rating Post Med Admin: 1 (2024  9:09 AM)  Sandra Score: 10 (2024 11:55 AM)

## 2024-09-18 NOTE — PLAN OF CARE
"  Problem: Adult Inpatient Plan of Care  Goal: Plan of Care Review  Outcome: Progressing  Goal: Patient-Specific Goal (Individualized)  Description: "I want to go home with a healthy baby."  Outcome: Progressing  Goal: Absence of Hospital-Acquired Illness or Injury  Outcome: Progressing  Goal: Optimal Comfort and Wellbeing  Outcome: Progressing  Goal: Readiness for Transition of Care  Outcome: Progressing     Problem: Diabetes Comorbidity  Goal: Blood Glucose Level Within Targeted Range  Outcome: Progressing     Problem: Infection  Goal: Absence of Infection Signs and Symptoms  Outcome: Progressing     Problem:  Fall Injury Risk  Goal: Absence of Fall, Infant Drop and Related Injury  Outcome: Progressing     Problem: Wound  Goal: Optimal Coping  Outcome: Progressing  Goal: Optimal Functional Ability  Outcome: Progressing  Goal: Absence of Infection Signs and Symptoms  Outcome: Progressing  Goal: Improved Oral Intake  Outcome: Progressing  Goal: Optimal Pain Control and Function  Outcome: Progressing  Goal: Skin Health and Integrity  Outcome: Progressing  Goal: Optimal Wound Healing  Outcome: Progressing     Problem: Postpartum ( Delivery)  Goal: Successful Parent Role Transition  Outcome: Progressing  Goal: Hemostasis  Outcome: Progressing  Goal: Effective Bowel Elimination  Outcome: Progressing  Goal: Fluid and Electrolyte Balance  Outcome: Progressing  Goal: Absence of Infection Signs and Symptoms  Outcome: Progressing  Goal: Anesthesia/Sedation Recovery  Outcome: Progressing  Goal: Optimal Pain Control and Function  Outcome: Progressing  Goal: Nausea and Vomiting Relief  Outcome: Progressing  Goal: Effective Urinary Elimination  Outcome: Progressing  Goal: Effective Oxygenation and Ventilation  Outcome: Progressing     "

## 2024-09-19 VITALS
HEIGHT: 64 IN | WEIGHT: 161 LBS | BODY MASS INDEX: 27.49 KG/M2 | DIASTOLIC BLOOD PRESSURE: 87 MMHG | HEART RATE: 91 BPM | OXYGEN SATURATION: 99 % | TEMPERATURE: 98 F | RESPIRATION RATE: 16 BRPM | SYSTOLIC BLOOD PRESSURE: 129 MMHG

## 2024-09-19 PROBLEM — O10.919 CHRONIC HYPERTENSION AFFECTING PREGNANCY: Status: RESOLVED | Noted: 2021-07-06 | Resolved: 2024-09-19

## 2024-09-19 PROBLEM — O24.913 DIABETES MELLITUS AFFECTING PREGNANCY IN THIRD TRIMESTER: Status: RESOLVED | Noted: 2024-04-29 | Resolved: 2024-09-19

## 2024-09-19 PROBLEM — O98.513 HERPES VIRUS INFECTION IN MOTHER DURING THIRD TRIMESTER OF PREGNANCY: Status: RESOLVED | Noted: 2024-04-29 | Resolved: 2024-09-19

## 2024-09-19 PROBLEM — O99.343 MENTAL DISORDER AFFECTING PREGNANCY IN THIRD TRIMESTER: Status: RESOLVED | Noted: 2021-07-07 | Resolved: 2024-09-19

## 2024-09-19 PROBLEM — K59.00 CONSTIPATION DURING PREGNANCY IN SECOND TRIMESTER: Status: RESOLVED | Noted: 2023-06-22 | Resolved: 2024-09-19

## 2024-09-19 PROBLEM — O09.299 HISTORY OF PRE-ECLAMPSIA IN PRIOR PREGNANCY, CURRENTLY PREGNANT: Status: RESOLVED | Noted: 2024-04-09 | Resolved: 2024-09-19

## 2024-09-19 PROBLEM — B00.9 HERPES VIRUS INFECTION IN MOTHER DURING THIRD TRIMESTER OF PREGNANCY: Status: RESOLVED | Noted: 2024-04-29 | Resolved: 2024-09-19

## 2024-09-19 PROBLEM — Z87.59 HISTORY OF PRE-ECLAMPSIA: Status: ACTIVE | Noted: 2024-04-09

## 2024-09-19 PROBLEM — O99.612 CONSTIPATION DURING PREGNANCY IN SECOND TRIMESTER: Status: RESOLVED | Noted: 2023-06-22 | Resolved: 2024-09-19

## 2024-09-19 PROBLEM — R10.10 PAIN OF UPPER ABDOMEN: Status: RESOLVED | Noted: 2023-06-22 | Resolved: 2024-09-19

## 2024-09-19 PROBLEM — O09.523 MULTIGRAVIDA OF ADVANCED MATERNAL AGE IN THIRD TRIMESTER: Status: RESOLVED | Noted: 2024-04-29 | Resolved: 2024-09-19

## 2024-09-19 LAB — POCT GLUCOSE: 94 MG/DL (ref 70–110)

## 2024-09-19 PROCEDURE — 25000003 PHARM REV CODE 250

## 2024-09-19 RX ORDER — OXYCODONE AND ACETAMINOPHEN 5; 325 MG/1; MG/1
1 TABLET ORAL EVERY 4 HOURS PRN
Qty: 15 TABLET | Refills: 0 | Status: SHIPPED | OUTPATIENT
Start: 2024-09-19

## 2024-09-19 RX ORDER — AMOXICILLIN 250 MG
1 CAPSULE ORAL NIGHTLY PRN
Qty: 14 TABLET | Refills: 0 | Status: SHIPPED | OUTPATIENT
Start: 2024-09-19 | End: 2024-10-03

## 2024-09-19 RX ORDER — IBUPROFEN 600 MG/1
600 TABLET ORAL EVERY 6 HOURS
Qty: 56 TABLET | Refills: 0 | Status: SHIPPED | OUTPATIENT
Start: 2024-09-19 | End: 2024-10-03

## 2024-09-19 RX ADMIN — IBUPROFEN 600 MG: 600 TABLET, FILM COATED ORAL at 02:09

## 2024-09-19 RX ADMIN — PRENATAL VITAMINS-IRON FUMARATE 27 MG IRON-FOLIC ACID 0.8 MG TABLET 1 TABLET: at 08:09

## 2024-09-19 RX ADMIN — IBUPROFEN 600 MG: 600 TABLET, FILM COATED ORAL at 08:09

## 2024-09-19 RX ADMIN — DOCUSATE SODIUM 200 MG: 100 CAPSULE, LIQUID FILLED ORAL at 08:09

## 2024-09-19 NOTE — DISCHARGE SUMMARY
Delivery Discharge Summary  [facility]    Admission date: 2024  Discharge date: 2024    Admit Dx:     GDMA2  IUP @ 38w0d  Patient Active Problem List   Diagnosis    Anxiety    Depressive disorder    - Multinodular goiter    Prediabetes    PCOS (polycystic ovarian syndrome)    History of tobacco abuse    De Quervain's tenosynovitis    History of MILES positive for HSV    History of pre-eclampsia    S/P repeat low transverse         Discharge Dx:    Patient Active Problem List   Diagnosis    Anxiety    Depressive disorder    - Multinodular goiter    Prediabetes    PCOS (polycystic ovarian syndrome)    History of tobacco abuse    De Quervain's tenosynovitis    History of MILES positive for HSV    History of pre-eclampsia    S/P repeat low transverse        Procedure: , due to H/o CS and GDMA2    Hospital Course:  Ruby Angel is a 39 y.o. now , POD #2 who was admitted on 2024 for scheduled RLTCS in the setting of GDMA2 . Patient was subsequently admitted to labor and delivery unit with signed consents. Delivery via  was performed without complications. Please see delivery note for further details. Her postpartum course was uncomplicated. On discharge day, patient's pain is controlled with oral pain medications. Pt is tolerating ambulation without SOB or CP, and regular diet without N/V. Reports lochia is mild. Denies any HA, vision changes, F/C, LE swelling. Denies any breast pain/soreness.    Pt in stable condition and ready for discharge. She has been instructed to start and/or continue medications and follow up with her obstetrics provider as listed below.    Physical exam:   Vitals:  Temp:  [97.7 °F (36.5 °C)-97.8 °F (36.6 °C)]   Pulse:  [78-91]   Resp:  [16]   BP: (110-129)/(70-87)   SpO2:  [97 %-99 %]   Gen: AAO x 3, NAD  HEENT: NCAT, MMM, EOMI  CV: RRR, no murmurs; S1/S2  Resp: Clear to auscultation bilaterally with no wheezing, stridor, or  "upper airways sounds, good air movement, nontender chest  Abd: soft, +bowel sounds, incision dry clean and intact   : uterus firm below umbilicus, lochia scant  Ext: no edema, DP/Radial 2+     Pertinent studies:    Delivery:    Episiotomy:     Lacerations:     Repair suture:     Repair # of packets:     Blood loss (ml):       Birth information:  YOB: 2022   Time of birth: 9:34 AM   Sex: female   Delivery type: , Low Transverse   Gestational Age: 38w0d     Measurements    Weight: 3317 g  Weight (lbs): 7 lb 5 oz  Length: 48.3 cm  Length (in): 19"  Head circumference: 35 cm         Delivery Clinician: Delivery Providers    Delivering clinician: Garth Napier MD   Provider Role    Curt Sims, RN Registered Nurse             Additional  information:  Forceps:    Vacuum:    Breech:    Observed anomalies      Living?:     Apgars    Living status: Living  Apgar Component Scores:  1 min.:  5 min.:  10 min.:  15 min.:  20 min.:    Skin color:  0  1       Heart rate:  2  2       Reflex irritability:  2  2       Muscle tone:  2  2       Respiratory effort:  2  2       Total:  8  9       Apgars assigned by: RASHAWN PAINTING RN         Placenta: Delivered:       appearance    Labs:   Patient's Prenatal labs reviewed -   Blood Type:  Lab Results   Component Value Date    GROUPTRH A POS 2024      Rubella Immune Status   Lab Results   Component Value Date    RUBABIGG Positive 2024     Varicella Immune Status   Lab Results   Component Value Date    VZABGS Positive 2024     Most recent H/H:  Recent Labs   Lab 24  0440   HGB 11.5*   HCT 35.3*       Disposition: To home, self care    Follow Up:   [facility] in 1 week for incision check  [facility] in 6 weeks    Patient Instructions:     1. Report to OB ED or call clinic for any bleeding >2 pads/hour for >2 hours, temperature >100.4, foul smelling discharge, pain uncontrolled with medications, incision with warmth or drainage,or or for " "any other concerns.  2. Pelvic rest x6 weeks and no tub baths x 2 weeks  3. Rx for Percocet , Motrin, and Colace provided. No driving while on narcotics.  4. Post partum contraception: declines at this time  5. GDMA2- will need 2 hour OGTT at 6 weeks postpartum visit    Current Discharge Medication List        START taking these medications    Details   ibuprofen (ADVIL,MOTRIN) 600 MG tablet Take 1 tablet (600 mg total) by mouth every 6 (six) hours. for 14 days  Qty: 56 tablet, Refills: 0      oxyCODONE-acetaminophen (PERCOCET) 5-325 mg per tablet Take 1 tablet by mouth every 4 (four) hours as needed for Pain.  Qty: 15 tablet, Refills: 0    Comments: Quantity prescribed more than 7 day supply? No      senna-docusate 8.6-50 mg (PERICOLACE) 8.6-50 mg per tablet Take 1 tablet by mouth nightly as needed for Constipation.  Qty: 14 tablet, Refills: 0           STOP taking these medications       aspirin (ECOTRIN) 81 MG EC tablet Comments:   Reason for Stopping:         BD ULTRA-FINE MINI PEN NEEDLE 31 gauge x 3/16" Ndle Comments:   Reason for Stopping:         blood sugar diagnostic ( BLOOD GLUCOSE TEST STRIP) Strp Comments:   Reason for Stopping:         insulin aspart U-100 (NOVOLOG FLEXPEN U-100 INSULIN) 100 unit/mL (3 mL) InPn pen Comments:   Reason for Stopping:         lancets 33 gauge Misc Comments:   Reason for Stopping:         LANTUS SOLOSTAR U-100 INSULIN 100 unit/mL (3 mL) InPn pen Comments:   Reason for Stopping:         metFORMIN (GLUCOPHAGE) 1000 MG tablet Comments:   Reason for Stopping:         PNV,calcium 72/iron/folic acid (PRENATAL VITAMIN) Tab Comments:   Reason for Stopping:         TRUE METRIX GLUCOSE METER Misc Comments:   Reason for Stopping:         valACYclovir (VALTREX) 500 MG tablet Comments:   Reason for Stopping:             Rebecca Jarvis MD, PGY-2  [employer]       "

## 2024-09-23 ENCOUNTER — OFFICE VISIT (OUTPATIENT)
Dept: FAMILY MEDICINE | Facility: CLINIC | Age: 39
End: 2024-09-23
Payer: MEDICAID

## 2024-09-23 VITALS
HEIGHT: 64 IN | BODY MASS INDEX: 26.36 KG/M2 | WEIGHT: 154.38 LBS | OXYGEN SATURATION: 98 % | SYSTOLIC BLOOD PRESSURE: 137 MMHG | DIASTOLIC BLOOD PRESSURE: 79 MMHG | HEART RATE: 84 BPM | TEMPERATURE: 99 F

## 2024-09-23 DIAGNOSIS — Z98.891 S/P REPEAT LOW TRANSVERSE C-SECTION: Primary | ICD-10-CM

## 2024-09-23 PROCEDURE — 99213 OFFICE O/P EST LOW 20 MIN: CPT | Mod: PBBFAC

## 2024-09-23 RX ORDER — ALPRAZOLAM 1 MG/1
TABLET ORAL
COMMUNITY
Start: 2024-09-22

## 2024-09-23 RX ORDER — DEXTROAMPHETAMINE SACCHARATE, AMPHETAMINE ASPARTATE, DEXTROAMPHETAMINE SULFATE AND AMPHETAMINE SULFATE 7.5; 7.5; 7.5; 7.5 MG/1; MG/1; MG/1; MG/1
TABLET ORAL
COMMUNITY
Start: 2024-08-16

## 2024-09-23 RX ORDER — INSULIN GLARGINE 100 [IU]/ML
INJECTION, SOLUTION SUBCUTANEOUS
COMMUNITY
Start: 2024-09-23

## 2024-09-23 NOTE — PROGRESS NOTES
Postpartum OB Clinic    Chief Complaint  Chief Complaint   Patient presents with    Follow-up     Pp for wound check          History of Present Illness  Ruby Angel is a 39 y.o.  S/P  Delivery Post-Operative day 6 here at clinic for 1wk f/u visit.    Pt is s/p LCTS at 38w0d gestation on 24. Apgars 8/9. Pregnancy complicated by GDM . Patient is doing well today; has support at home and is currently Bottle feeding. She is no longer taking insulin and has not been checking sugars at home.  She denies depressed mood or suicidal/homicidal ideations. States infant is Home, doing well. Denies HA, light-headedness/dizziness, visual changes, CP, le edema, SOB, abdominal pain, n/v, dec appetite.Denies any other social needs.    Followed by psych, who recently prescribed her xanax and adderall.     ROS:  - see HPI    Physical Exam:  Temp:  [98.7 °F (37.1 °C)]   Pulse:  [84]   BP: (137)/(79)   SpO2:  [98 %]   General:  VSS, afebrile. No acute distress.   Respiratory: Non labored.  No coughing.  Cardiovascular:  No peripheral edema.  DP pulses palpable bilaterally.   ABD: BS+, Soft, nontender ;  incision is healing appropriately ; no drainage or erythema   Musculoskeletal:  Full range of motion of all extremities; no joint deformities or edema.   Neurologic:  A&O X 3.    Psychiatric:  Calm, cooperative.  Mood and effect normal.  Responses appropriate.     [place]  Depression Scale: 7       Medication List with Changes/Refills   Current Medications    ALPRAZOLAM (XANAX) 1 MG TABLET    Take by mouth.    DEXTROAMPHETAMINE-AMPHETAMINE 30 MG TAB    Take by mouth.    IBUPROFEN (ADVIL,MOTRIN) 600 MG TABLET    Take 1 tablet (600 mg total) by mouth every 6 (six) hours. for 14 days    LANTUS SOLOSTAR U-100 INSULIN 100 UNIT/ML (3 ML) INPN PEN    Inject into the skin.    OXYCODONE-ACETAMINOPHEN (PERCOCET) 5-325 MG PER TABLET    Take 1 tablet by mouth every 4 (four) hours as needed for Pain.     SENNA-DOCUSATE 8.6-50 MG (PERICOLACE) 8.6-50 MG PER TABLET    Take 1 tablet by mouth nightly as needed for Constipation.         Assessment / Plan:     Status post , due to GDMA2  routine postpartum follow-up  Doing well, instructed patient on proper wound care, to keep the incision area dry and clean   Educated pt on signs and sx of post partum blues, post partum depression and post partum psychosis.   Plans to bottle feed  Contraception: none   Stay off insulin ; monitor sugars daily   Follow up at 6 weeks postpartum for 2 hour GTT    RTC: No follow-ups on file.    Indra Sanches MD   [facility], HO-2  2024

## 2024-09-24 ENCOUNTER — PATIENT MESSAGE (OUTPATIENT)
Dept: ADMINISTRATIVE | Facility: OTHER | Age: 39
End: 2024-09-24
Payer: MEDICAID

## 2024-09-24 NOTE — PROGRESS NOTES
I reviewed History, PE, A/P and chart was reviewed.  Services provided in the outpatient department of  a teaching facility, I was immediately available.  I agree with resident, care reasonable and necessary with any exceptions stated below.  Management discussed with resident at time of visit.    Myla Carroll MD  \[facility]\"" Family Medicine Residency - [place]  [facility]

## 2024-10-08 ENCOUNTER — PATIENT MESSAGE (OUTPATIENT)
Dept: FAMILY MEDICINE | Facility: CLINIC | Age: 39
End: 2024-10-08
Payer: MEDICAID

## 2024-10-31 ENCOUNTER — OFFICE VISIT (OUTPATIENT)
Dept: FAMILY MEDICINE | Facility: CLINIC | Age: 39
End: 2024-10-31
Payer: MEDICAID

## 2024-10-31 VITALS
BODY MASS INDEX: 25.06 KG/M2 | OXYGEN SATURATION: 100 % | DIASTOLIC BLOOD PRESSURE: 65 MMHG | SYSTOLIC BLOOD PRESSURE: 128 MMHG | WEIGHT: 146.81 LBS | TEMPERATURE: 98 F | HEIGHT: 64 IN | HEART RATE: 74 BPM

## 2024-10-31 DIAGNOSIS — Z23 IMMUNIZATION DUE: ICD-10-CM

## 2024-10-31 DIAGNOSIS — O24.415 GESTATIONAL DIABETES MELLITUS (GDM) IN SECOND TRIMESTER CONTROLLED ON ORAL HYPOGLYCEMIC DRUG: ICD-10-CM

## 2024-10-31 PROCEDURE — 99213 OFFICE O/P EST LOW 20 MIN: CPT | Mod: PBBFAC

## 2024-10-31 PROCEDURE — 96372 THER/PROPH/DIAG INJ SC/IM: CPT | Mod: PBBFAC

## 2024-10-31 RX ORDER — MEDROXYPROGESTERONE ACETATE 150 MG/ML
150 INJECTION, SUSPENSION INTRAMUSCULAR
Status: COMPLETED | OUTPATIENT
Start: 2024-10-31 | End: 2024-10-31

## 2024-10-31 RX ADMIN — MEDROXYPROGESTERONE ACETATE 150 MG: 150 INJECTION, SUSPENSION INTRAMUSCULAR at 02:10

## 2024-11-06 ENCOUNTER — OFFICE VISIT (OUTPATIENT)
Dept: FAMILY MEDICINE | Facility: CLINIC | Age: 39
End: 2024-11-06
Payer: MEDICAID

## 2024-11-06 VITALS
RESPIRATION RATE: 18 BRPM | HEART RATE: 77 BPM | HEIGHT: 64 IN | DIASTOLIC BLOOD PRESSURE: 80 MMHG | WEIGHT: 145.63 LBS | OXYGEN SATURATION: 98 % | BODY MASS INDEX: 24.86 KG/M2 | TEMPERATURE: 100 F | SYSTOLIC BLOOD PRESSURE: 130 MMHG

## 2024-11-06 DIAGNOSIS — O24.415 GESTATIONAL DIABETES MELLITUS (GDM) IN SECOND TRIMESTER CONTROLLED ON ORAL HYPOGLYCEMIC DRUG: Primary | ICD-10-CM

## 2024-11-06 PROCEDURE — 99214 OFFICE O/P EST MOD 30 MIN: CPT | Mod: PBBFAC

## 2024-11-06 NOTE — PROGRESS NOTES
"[facility] Note    Subjective:     Patient ID: Ruby Angel is a 39 y.o. female    Chief Complaint:   Chief Complaint   Patient presents with    Follow-up    Postpartum Care     7 weeks PP     glucose testing     2 HR GTT       HPI  Ruby Angel is a 39 y.o. female who presents for post partum follow-up      s/p C/S at 38+0  Pregnancy complicated by GDM  Here to perform 2hr GTT  Patient had cappuccino this morning     Review of Systems  As per HPI    Objective:         2024     2:02 PM 10/31/2024     1:50 PM 2024     9:54 AM   Vitals - 1 value per visit   SYSTOLIC 137 128 130   DIASTOLIC 79 65 80   Pulse 84 74 77   Temp 98.7 °F (37.1 °C) 98 °F (36.7 °C) 99.5 °F (37.5 °C)   Resp   18   SPO2 98 % 100 % 98 %   Weight (lb) 154.4 146.8 145.6   Weight (kg) 70.035 66.588 66.044   Height 5' 4" (1.626 m) 5' 4" (1.626 m) 5' 4" (1.626 m)   BMI (Calculated) 26.5 25.2 25   Pain Score Six  Zero     Physical Exam  Vitals and nursing note reviewed.   Constitutional:       Appearance: Normal appearance.   HENT:      Mouth/Throat:      Comments: Wearing mask   Cardiovascular:      Rate and Rhythm: Normal rate and regular rhythm.   Neurological:      Mental Status: She is alert.         Current Outpatient Medications   Medication Instructions    ALPRAZolam (XANAX) 1 MG tablet Oral    dextroamphetamine-amphetamine 30 mg Tab Oral    LANTUS SOLOSTAR U-100 INSULIN 100 unit/mL (3 mL) InPn pen Subcutaneous         Assessment & Plan     GDM  - Needs 2hr GTT  - Will order today and have patient return in morning to complete  - Unable to do today given pt's oral intake  - Patient counseled on importance of fasting before the GTT  - She is agreeable    Orders Placed This Encounter    Glucose Tolerance, 2 Hours     Health Maintenance   Topic Date Due    TETANUS VACCINE  2034    Hepatitis C Screening  Completed    Lipid Panel  Completed     Future Appointments   Date Time Provider Department Center "   1/23/2025  3:00 PM POSTPARTUM, [facility] MED RESIDENTS [facility]       RTC in 2 mo    Indra Sanches MD  [facility], PGY-2

## 2024-11-07 ENCOUNTER — LAB VISIT (OUTPATIENT)
Dept: FAMILY MEDICINE | Facility: CLINIC | Age: 39
End: 2024-11-07
Payer: MEDICAID

## 2024-11-07 DIAGNOSIS — O24.415 GESTATIONAL DIABETES MELLITUS (GDM) IN SECOND TRIMESTER CONTROLLED ON ORAL HYPOGLYCEMIC DRUG: ICD-10-CM

## 2024-11-07 LAB
GLUCOSE 1H P 100 G GLC PO SERPL-MCNC: 175 MG/DL (ref 100–180)
GLUCOSE 2H P 100 G GLC PO SERPL-MCNC: 147 MG/DL (ref 70–140)
GLUCOSE P FAST SERPL-MCNC: 91 MG/DL (ref 70–100)

## 2024-11-07 PROCEDURE — 36415 COLL VENOUS BLD VENIPUNCTURE: CPT

## 2024-11-07 PROCEDURE — 82950 GLUCOSE TEST: CPT

## 2024-11-07 PROCEDURE — 82947 ASSAY GLUCOSE BLOOD QUANT: CPT

## 2024-11-07 PROCEDURE — 82951 GLUCOSE TOLERANCE TEST (GTT): CPT

## 2024-11-08 NOTE — PROGRESS NOTES
Patient was discussed with the resident on the DOS.   Services were provided at a teaching facility.   I have reviewed and agree with the resident's findings, including all diagnostic interpretations and plans as written.     Jazlyn Cabrera MD  Family Medicine  [facility] / [facility]

## 2024-11-12 ENCOUNTER — TELEPHONE (OUTPATIENT)
Dept: FAMILY MEDICINE | Facility: CLINIC | Age: 39
End: 2024-11-12
Payer: MEDICAID

## 2024-11-12 RX ORDER — METFORMIN HYDROCHLORIDE 1000 MG/1
1000 TABLET ORAL
Qty: 90 TABLET | Refills: 0 | Status: SHIPPED | OUTPATIENT
Start: 2024-11-12 | End: 2025-02-10

## 2024-11-12 NOTE — TELEPHONE ENCOUNTER
Spoke with patient about GTT results. Minimally elevated. Shared about increased risk for developing T2DM later in life. Pt requests to be placed back on metformin. Will place order for this medication. Encouraged healthy diet and regular exercise

## 2025-04-25 ENCOUNTER — HOSPITAL ENCOUNTER (EMERGENCY)
Facility: HOSPITAL | Age: 40
Discharge: HOME | End: 2025-04-25
Attending: INTERNAL MEDICINE
Payer: MEDICAID

## 2025-04-25 VITALS
RESPIRATION RATE: 16 BRPM | WEIGHT: 156 LBS | TEMPERATURE: 98 F | BODY MASS INDEX: 26.78 KG/M2 | SYSTOLIC BLOOD PRESSURE: 123 MMHG | DIASTOLIC BLOOD PRESSURE: 88 MMHG | OXYGEN SATURATION: 99 % | HEART RATE: 98 BPM

## 2025-04-25 DIAGNOSIS — M54.12 CERVICAL RADICULOPATHY: Primary | ICD-10-CM

## 2025-04-25 LAB
B-HCG UR QL: NEGATIVE
CTP QC/QA: YES

## 2025-04-25 PROCEDURE — 96372 THER/PROPH/DIAG INJ SC/IM: CPT | Performed by: NURSE PRACTITIONER

## 2025-04-25 PROCEDURE — 63600175 PHARM REV CODE 636 W HCPCS: Mod: JZ,TB | Performed by: NURSE PRACTITIONER

## 2025-04-25 PROCEDURE — 99284 EMERGENCY DEPT VISIT MOD MDM: CPT | Mod: 25

## 2025-04-25 PROCEDURE — 81025 URINE PREGNANCY TEST: CPT | Performed by: NURSE PRACTITIONER

## 2025-04-25 RX ORDER — GABAPENTIN 300 MG/1
300 CAPSULE ORAL DAILY
Qty: 14 CAPSULE | Refills: 0 | Status: SHIPPED | OUTPATIENT
Start: 2025-04-25 | End: 2025-05-09

## 2025-04-25 RX ORDER — KETOROLAC TROMETHAMINE 30 MG/ML
30 INJECTION, SOLUTION INTRAMUSCULAR; INTRAVENOUS
Status: COMPLETED | OUTPATIENT
Start: 2025-04-25 | End: 2025-04-25

## 2025-04-25 RX ORDER — INDOMETHACIN 25 MG/1
25 CAPSULE ORAL 2 TIMES DAILY PRN
Qty: 14 CAPSULE | Refills: 0 | Status: SHIPPED | OUTPATIENT
Start: 2025-04-25

## 2025-04-25 RX ORDER — BACLOFEN 10 MG/1
10 TABLET ORAL 3 TIMES DAILY PRN
Qty: 21 TABLET | Refills: 0 | Status: SHIPPED | OUTPATIENT
Start: 2025-04-25

## 2025-04-25 RX ADMIN — KETOROLAC TROMETHAMINE 30 MG: 30 INJECTION, SOLUTION INTRAMUSCULAR at 07:04

## 2025-04-25 NOTE — ED PROVIDER NOTES
"Encounter Date: 2025       History     Chief Complaint   Patient presents with    Shoulder Pain     PT W CO CONTINUED RT SHOULDER PAIN AFTER WORK RELATED INJURY 25 WHILE MOVING A PT.   PT FEELS IT SHOULD BE IMPROVING BY NOW.       Patient is a 40-year-old female who presents for evaluation of her right shoulder.  Reports being struck in the shoulder on 2025 while at work.  Says she was evaluated by an outside facility who provided x-rays of joint with no fracture being found.  Admits the pain was improving but she has been lifting on her infant at home and now she is having radiating pain with intermittent "shocks" in affected extremity.  Denies chest pain, shortness of breath, fever, abdominal pain, or loss of bowel or bladder control.      Review of patient's allergies indicates:   Allergen Reactions    Benadryl allergy-sinus Shortness Of Breath    Diphenhydramine hcl Swelling    Diphenhyd-pe-acetaminophen      Past Medical History:   Diagnosis Date    Abnormal Pap smear of cervix     Anemia     Anxiety     Depression     Diabetes mellitus     Herpes simplex virus (HSV) infection     Hypertension     Postpartum depression     Vitamin D deficiency      Past Surgical History:   Procedure Laterality Date     SECTION       SECTION N/A 2024    Procedure:  SECTION;  Surgeon: Garth Napier MD;  Location: [facility];  Service: OB/GYN;  Laterality: N/A;    COLONOSCOPY W/ BIOPSIES AND POLYPECTOMY  2018    DILATION AND CURETTAGE OF UTERUS  2018     Family History   Problem Relation Name Age of Onset    Hypertension Father Chris Hood     Diabetes Father Chris Hood     Kidney disease Father Chris Hood     Hypertension Mother Brennon Pérez     Diabetes Mother Brennon Pérez     Heart disease Mother Brennon Pérez     Cervical cancer Mother Brennon Pérez     Kidney failure Brother      Kidney disease Brother Priscila Hood     Autoimmune disease Sister       Social " History[1]  Review of Systems   Constitutional:  Negative for chills, diaphoresis, fatigue and fever.   HENT:  Negative for facial swelling, rhinorrhea, sinus pressure, sinus pain, sore throat and trouble swallowing.    Respiratory:  Negative for cough, chest tightness, shortness of breath and wheezing.    Cardiovascular:  Negative for chest pain, palpitations and leg swelling.   Gastrointestinal:  Negative for abdominal pain, diarrhea, nausea and vomiting.   Genitourinary:  Negative for dysuria, flank pain, frequency, hematuria and urgency.   Musculoskeletal:  Positive for arthralgias and myalgias. Negative for back pain and joint swelling.   Skin:  Negative for color change and rash.   Neurological:  Negative for dizziness, syncope, weakness and light-headedness.   Hematological:  Does not bruise/bleed easily.   All other systems reviewed and are negative.      Physical Exam     Initial Vitals [04/25/25 1829]   BP Pulse Resp Temp SpO2   123/88 98 16 97.9 °F (36.6 °C) 99 %      MAP       --         Physical Exam    Nursing note and vitals reviewed.  Constitutional: She appears well-developed and well-nourished.   HENT:   Head: Normocephalic and atraumatic.   Nose: Nose normal. Mouth/Throat: Oropharynx is clear and moist.   Eyes: Conjunctivae and EOM are normal. Pupils are equal, round, and reactive to light.   Neck: Neck supple.   Normal range of motion.  Cardiovascular:  Normal rate, regular rhythm, normal heart sounds and intact distal pulses.           Pulmonary/Chest: Effort normal and breath sounds normal. No respiratory distress. She has no wheezes. She has no rhonchi. She has no rales. She exhibits no tenderness.   Abdominal: Abdomen is soft and flat. Bowel sounds are normal. She exhibits no distension. There is no abdominal tenderness. There is no rebound, no guarding, no tenderness at McBurney's point and negative Lea's sign. negative psoas sign  Musculoskeletal:         General: Normal range of motion.       Right shoulder: Tenderness present. No swelling or deformity. Normal strength. Normal pulse.        Arms:       Cervical back: Normal range of motion and neck supple.     Neurological: She is alert and oriented to person, place, and time. She has normal strength and normal reflexes.   Skin: Skin is warm and dry. Capillary refill takes less than 2 seconds.   Psychiatric: She has a normal mood and affect. Her speech is normal and behavior is normal. Judgment and thought content normal.         ED Course   Procedures  Labs Reviewed   POCT URINE PREGNANCY       Result Value    POC Preg Test, Ur Negative       Acceptable Yes            Imaging Results    None          Medications   ketorolac injection 30 mg (has no administration in time range)     Medical Decision Making  Differential:   Cervical radiculopathy   Muscle    Amount and/or Complexity of Data Reviewed  Labs: ordered.               ED Course as of 04/25/25 1903 Fri Apr 25, 2025 1901 Given strict ED return precautions. I have spoken with the patient and/or caregivers. I have explained the patient's condition, diagnoses and treatment plan based on the information available to me at this time. I have answered the patient's and/or caregiver's questions and addressed any concerns. The patient and/or caregivers have as good an understanding of the patient's diagnosis, condition and treatment plan as can be expected at this point. The vital signs have been stable. The patient's condition is stable and appropriate for discharge from the emergency department.      The patient will pursue further outpatient evaluation with the primary care physician or other designated or consulting physician as outlined in the discharge instructions. The patient and/or caregivers are agreeable to this plan of care and follow-up instructions have been explained in detail. The patient and/or caregivers have received these instructions in written format and have  expressed an understanding of the discharge instructions. The patient and/or caregivers are aware that any significant change in condition or worsening of symptoms should prompt an immediate return to this or the closest emergency department or a call to 911.   [JA]      ED Course User Index  [JA] Rocky Lopes Jr., FNP                       This chart is generated using a voice recognition system. Grammatical and content areas may inadvertently be generated in error. Please contact me if you find a perceive any inappropriate information in this chart. Thank you.       Clinical Impression:  Final diagnoses:  [M54.12] Cervical radiculopathy (Primary)          ED Disposition Condition    Discharge Stable          ED Prescriptions       Medication Sig Dispense Start Date End Date Auth. Provider    indomethacin (INDOCIN) 25 MG capsule Take 1 capsule (25 mg total) by mouth 2 (two) times daily as needed (pain). 14 capsule 4/25/2025 -- Rocky Lopes Jr., FNP    baclofen (LIORESAL) 10 MG tablet Take 1 tablet (10 mg total) by mouth 3 (three) times daily as needed (muscle spasms). 21 tablet 4/25/2025 -- Rocky Lopes Jr., FNP    gabapentin (NEURONTIN) 300 MG capsule Take 1 capsule (300 mg total) by mouth once daily. for 14 days 14 capsule 4/25/2025 5/9/2025 Rocky Lopes Jr., FNP          Follow-up Information       Follow up With Specialties Details Why Contact Ania Barrera NP Family Medicine In 3 days  [address] [facility] [address] [phone]      [facility] Emergency Medicine In 3 days As needed, If symptoms worsen [address] [facility] [address] [phone]               [1]   Social History  Tobacco Use    Smoking status: Former     Current packs/day: 0.50     Average packs/day: 0.5 packs/day for 7.5 years (3.7 ttl pk-yrs)     Types: Cigarettes     Start date: 11/10/2017     Passive exposure: Never    Smokeless tobacco: Never    Substance Use Topics    Alcohol use: Not Currently     Alcohol/week: 1.0 standard drink of alcohol     Types: 1 Glasses of wine per week     Comment: Occasionally    Drug use: Never        Rocky Lopes Jr., [facility]  04/25/25 1085

## 2025-08-04 PROBLEM — Z87.59 HISTORY OF PRE-ECLAMPSIA: Status: RESOLVED | Noted: 2024-04-09 | Resolved: 2025-08-04

## 2025-08-05 ENCOUNTER — OFFICE VISIT (OUTPATIENT)
Dept: URGENT CARE | Facility: CLINIC | Age: 40
End: 2025-08-05
Payer: MEDICAID

## 2025-08-05 VITALS
HEIGHT: 64 IN | DIASTOLIC BLOOD PRESSURE: 84 MMHG | SYSTOLIC BLOOD PRESSURE: 129 MMHG | TEMPERATURE: 98 F | BODY MASS INDEX: 28.36 KG/M2 | HEART RATE: 105 BPM | WEIGHT: 166.13 LBS | OXYGEN SATURATION: 100 % | RESPIRATION RATE: 18 BRPM

## 2025-08-05 DIAGNOSIS — R30.0 DYSURIA: Primary | ICD-10-CM

## 2025-08-05 LAB
BACTERIA #/AREA URNS AUTO: ABNORMAL /HPF
BILIRUB UR QL STRIP.AUTO: NEGATIVE
BILIRUB UR QL STRIP: POSITIVE
CLARITY UR: ABNORMAL
COLOR UR AUTO: YELLOW
GLUCOSE UR QL STRIP: NEGATIVE
GLUCOSE UR QL STRIP: NORMAL
HGB UR QL STRIP: NEGATIVE
HYALINE CASTS #/AREA URNS LPF: ABNORMAL /LPF
KETONES UR QL STRIP: ABNORMAL
KETONES UR QL STRIP: POSITIVE
LEUKOCYTE ESTERASE UR QL STRIP: NEGATIVE
LEUKOCYTE ESTERASE UR QL STRIP: NEGATIVE
MUCOUS THREADS URNS QL MICRO: ABNORMAL /LPF
NITRITE UR QL STRIP: NEGATIVE
PH UR STRIP: 6 [PH]
PH, POC UA: 6
POC BLOOD, URINE: NEGATIVE
POC NITRATES, URINE: NEGATIVE
PROT UR QL STRIP: ABNORMAL
PROT UR QL STRIP: POSITIVE
RBC #/AREA URNS AUTO: ABNORMAL /HPF
SP GR UR STRIP.AUTO: 1.03 (ref 1–1.03)
SP GR UR STRIP: >=1.03 (ref 1–1.03)
SQUAMOUS #/AREA URNS LPF: ABNORMAL /HPF
UROBILINOGEN UR STRIP-ACNC: ABNORMAL (ref 0.1–1.1)
UROBILINOGEN UR STRIP-ACNC: NORMAL
WBC #/AREA URNS AUTO: ABNORMAL /HPF

## 2025-08-05 PROCEDURE — 99214 OFFICE O/P EST MOD 30 MIN: CPT | Mod: PBBFAC

## 2025-08-05 PROCEDURE — 99214 OFFICE O/P EST MOD 30 MIN: CPT | Mod: S$PBB,,,

## 2025-08-05 PROCEDURE — 81015 MICROSCOPIC EXAM OF URINE: CPT

## 2025-08-05 PROCEDURE — 81003 URINALYSIS AUTO W/O SCOPE: CPT | Mod: PBBFAC

## 2025-08-05 RX ORDER — PHENAZOPYRIDINE HYDROCHLORIDE 200 MG/1
200 TABLET, FILM COATED ORAL 3 TIMES DAILY PRN
Qty: 30 TABLET | Refills: 0 | Status: SHIPPED | OUTPATIENT
Start: 2025-08-05 | End: 2025-08-15

## 2025-08-05 RX ORDER — CYPROHEPTADINE HYDROCHLORIDE 4 MG/1
4 TABLET ORAL 2 TIMES DAILY
COMMUNITY
Start: 2025-06-22

## 2025-08-05 NOTE — PATIENT INSTRUCTIONS
Drink plenty of water daily. Avoid soda.  Follow up with your primary care doctor as needed.  Present to the nearest Emergency Department with any significant change or worsening of symptoms including but not limited to blood in urine, nausea/vomiting, abdominal pain, fever, body aches, chills, or flank pain.

## 2025-08-05 NOTE — PROGRESS NOTES
"Subjective:       Patient ID: Ruby Angel is a 40 y.o. female.    Vitals:  height is 5' 4" (1.626 m) and weight is 75.3 kg (166 lb 1.6 oz). Her temperature is 98.4 °F (36.9 °C). Her blood pressure is 129/84 and her pulse is 105. Her respiration is 18 and oxygen saturation is 100%.     Chief Complaint: Dysuria (Painful urination, lower back/abd pain x 2 months. Denies being sexually active. Denies concern for STI.)    40-year-old female reports to the clinic with complaints of dysuria, lower back, and abdominal pain that began 2 months ago.  Patient states she has an 11-month-old daughter and has not been hydrating well during the day due to taking care of her 2 young children.  Patient states she has not been sexually active since before her daughter was born and denies concern for STIs today's visit.  Patient denies vaginal discharge, vaginal odor, vaginal irritation, genital rash, genital warts, genital lesions, suprapubic abdominal pain, flank pain.    All other systems are negative    Chart reviewed    Objective:   Physical Exam   Constitutional: She appears well-developed.  Non-toxic appearance. She does not appear ill. No distress.   Neck: Neck supple.   Cardiovascular: Regular rhythm.   Pulmonary/Chest: Effort normal and breath sounds normal.   Abdominal: Normal appearance. She exhibits no distension. Soft. flat abdomen There is no abdominal tenderness. There is no rebound, no guarding, no left CVA tenderness and no right CVA tenderness.   Musculoskeletal: Normal range of motion.         General: Normal range of motion.   Neurological: no focal deficit. She is alert.   Skin: Skin is warm, dry and not diaphoretic.   Psychiatric: Her behavior is normal. Mood normal.   Nursing note and vitals reviewed.      Assessment:     1. Dysuria          Results for orders placed or performed in visit on 08/05/25   POCT Urinalysis, Dipstick, Automated, W/O Scope    Collection Time: 08/05/25  9:42 AM   Result Value Ref " Range    POC Blood, Urine Negative Negative    POC Bilirubin, Urine Positive (A) Negative    POC Urobilinogen, Urine 0.2 E.U./dl 0.1 - 1.1    POC Ketones, Urine Positive (A) Negative    POC Protein, Urine Positive (A) Negative    POC Nitrates, Urine Negative Negative    POC Glucose, Urine Negative Negative    pH, UA 6.0     POC Specific Gravity, Urine >=1.030 1.003 - 1.029    POC Leukocytes, Urine Negative Negative   Urinalysis, Reflex to Urine Culture Urine, Clean Catch    Collection Time: 08/05/25 10:14 AM    Specimen: Urine   Result Value Ref Range    Color, UA Yellow Yellow, Light-Yellow, Dark Yellow, Laxmi, Straw    Appearance, UA Turbid (A) Clear    Specific Gravity, UA 1.035 (H) 1.005 - 1.030    pH, UA 6.0 5.0 - 8.5    Protein, UA Trace (A) Negative    Glucose, UA Normal Negative, Normal    Ketones, UA Trace (A) Negative    Blood, UA Negative Negative    Bilirubin, UA Negative Negative    Urobilinogen, UA Normal 0.2, 1.0, Normal    Nitrites, UA Negative Negative    Leukocyte Esterase, UA Negative Negative    RBC, UA 0-5 None Seen, 0-2, 3-5, 0-5 /HPF    WBC, UA 0-5 None Seen, 0-2, 3-5, 0-5 /HPF    Bacteria, UA None Seen None Seen /HPF    Squamous Epithelial Cells, UA Moderate (A) None Seen /HPF    Mucous, UA Moderate (A) None Seen /LPF    Hyaline Casts, UA 0-2 (A) None Seen /lpf       Plan:   Discussed urinalysis results with patient  We will send urine to the lab for urinalysis with reflex urine culture and inform patient of any positive results that will require antibiotic treatment  Begin taking Pyridium by mouth 3 times daily as needed for dysuria  Drink plenty of water daily. Avoid soda.  Follow up with your primary care doctor as needed.  Present to the nearest Emergency Department with any significant change or worsening of symptoms including but not limited to blood in urine, nausea/vomiting, abdominal pain, fever, body aches, chills, or flank pain.        Dysuria  -     POCT Urinalysis, Dipstick,  Automated, W/O Scope  -     Urinalysis, Reflex to Urine Culture Urine, Clean Catch  -     phenazopyridine (PYRIDIUM) 200 MG tablet; Take 1 tablet (200 mg total) by mouth 3 (three) times daily as needed for Pain.  Dispense: 30 tablet; Refill: 0        Please note: This chart was completed via voice to text dictation. It may contain typographical/word recognition errors. If there are any questions, please contact the provider for final clarification.

## (undated) DEVICE — SUT 1 36IN CHROMIC GUT CTX

## (undated) DEVICE — MATTRESS HOVERMAT TRNSF 34X78

## (undated) DEVICE — SEE MEDLINE ITEM 156931

## (undated) DEVICE — SOL WATER STRL IRR 1000ML

## (undated) DEVICE — SUT CHROMIC 2-0 CT1 36IN BR

## (undated) DEVICE — ELECTRODE REM PLYHSV RETURN 9

## (undated) DEVICE — BULB SYRINGE EAR IRRIGATION

## (undated) DEVICE — PAD UNDERPAD 30X30

## (undated) DEVICE — PAD SANITARY OB STERILE

## (undated) DEVICE — SOL NACL IRR 1000ML BTL

## (undated) DEVICE — SUT PLAIN MONO 2-0 XLH 27IN

## (undated) DEVICE — SUT MONOCRYL 3-0 KS UND MON

## (undated) DEVICE — BINDER ABDOM 4PANEL 12IN LG/XL

## (undated) DEVICE — SEE MEDLINE ITEM 157117

## (undated) DEVICE — CAP BABY BEANIE

## (undated) DEVICE — SUT CTD VICRYL 1 UND BR

## (undated) DEVICE — TRAY CATH FOL SIL URIMTR 16FR

## (undated) DEVICE — TUBING FLUID WARMER LEVEL I